# Patient Record
Sex: FEMALE | Race: BLACK OR AFRICAN AMERICAN | NOT HISPANIC OR LATINO | Employment: PART TIME | RURAL
[De-identification: names, ages, dates, MRNs, and addresses within clinical notes are randomized per-mention and may not be internally consistent; named-entity substitution may affect disease eponyms.]

---

## 2023-05-15 ENCOUNTER — OFFICE VISIT (OUTPATIENT)
Dept: FAMILY MEDICINE | Facility: CLINIC | Age: 63
End: 2023-05-15
Payer: COMMERCIAL

## 2023-05-15 VITALS
WEIGHT: 87.38 LBS | SYSTOLIC BLOOD PRESSURE: 110 MMHG | HEART RATE: 63 BPM | OXYGEN SATURATION: 99 % | BODY MASS INDEX: 14.56 KG/M2 | RESPIRATION RATE: 18 BRPM | DIASTOLIC BLOOD PRESSURE: 70 MMHG | TEMPERATURE: 99 F | HEIGHT: 65 IN

## 2023-05-15 DIAGNOSIS — N39.0 URINARY TRACT INFECTION WITHOUT HEMATURIA, SITE UNSPECIFIED: ICD-10-CM

## 2023-05-15 DIAGNOSIS — Z80.0 FAMILY HISTORY OF COLON CANCER IN MOTHER: ICD-10-CM

## 2023-05-15 DIAGNOSIS — K59.00 CONSTIPATION, UNSPECIFIED CONSTIPATION TYPE: ICD-10-CM

## 2023-05-15 DIAGNOSIS — R10.32 BILATERAL LOWER ABDOMINAL CRAMPING: ICD-10-CM

## 2023-05-15 DIAGNOSIS — R10.31 BILATERAL LOWER ABDOMINAL CRAMPING: ICD-10-CM

## 2023-05-15 DIAGNOSIS — K92.1 BLOOD IN STOOL: Primary | ICD-10-CM

## 2023-05-15 LAB
BASOPHILS # BLD AUTO: 0.06 K/UL (ref 0–0.2)
BASOPHILS NFR BLD AUTO: 0.9 % (ref 0–1)
BILIRUB SERPL-MCNC: NEGATIVE MG/DL
BLOOD URINE, POC: ABNORMAL
CEA SERPL-MCNC: 3.4 NG/ML (ref 0–3)
COLOR, POC UA: YELLOW
DIFFERENTIAL METHOD BLD: ABNORMAL
EOSINOPHIL # BLD AUTO: 0.17 K/UL (ref 0–0.5)
EOSINOPHIL NFR BLD AUTO: 2.6 % (ref 1–4)
ERYTHROCYTE [DISTWIDTH] IN BLOOD BY AUTOMATED COUNT: 13.4 % (ref 11.5–14.5)
GLUCOSE UR QL STRIP: NEGATIVE
HCT VFR BLD AUTO: 40.6 % (ref 38–47)
HGB BLD-MCNC: 13.3 G/DL (ref 12–16)
IMM GRANULOCYTES # BLD AUTO: 0.01 K/UL (ref 0–0.04)
IMM GRANULOCYTES NFR BLD: 0.2 % (ref 0–0.4)
KETONES UR QL STRIP: NEGATIVE
LEUKOCYTE ESTERASE URINE, POC: ABNORMAL
LYMPHOCYTES # BLD AUTO: 2.23 K/UL (ref 1–4.8)
LYMPHOCYTES NFR BLD AUTO: 33.9 % (ref 27–41)
MCH RBC QN AUTO: 32.9 PG (ref 27–31)
MCHC RBC AUTO-ENTMCNC: 32.8 G/DL (ref 32–36)
MCV RBC AUTO: 100.5 FL (ref 80–96)
MONOCYTES # BLD AUTO: 0.66 K/UL (ref 0–0.8)
MONOCYTES NFR BLD AUTO: 10 % (ref 2–6)
MPC BLD CALC-MCNC: 10.6 FL (ref 9.4–12.4)
NEUTROPHILS # BLD AUTO: 3.44 K/UL (ref 1.8–7.7)
NEUTROPHILS NFR BLD AUTO: 52.4 % (ref 53–65)
NITRITE, POC UA: POSITIVE
NRBC # BLD AUTO: 0 X10E3/UL
NRBC, AUTO (.00): 0 %
PH, POC UA: 5.5
PLATELET # BLD AUTO: 290 K/UL (ref 150–400)
PROTEIN, POC: NEGATIVE
RBC # BLD AUTO: 4.04 M/UL (ref 4.2–5.4)
SPECIFIC GRAVITY, POC UA: 1.01
UROBILINOGEN, POC UA: 0.2
WBC # BLD AUTO: 6.57 K/UL (ref 4.5–11)

## 2023-05-15 PROCEDURE — 87086 CULTURE, URINE: ICD-10-PCS | Mod: ,,, | Performed by: CLINICAL MEDICAL LABORATORY

## 2023-05-15 PROCEDURE — 87086 URINE CULTURE/COLONY COUNT: CPT | Mod: ,,, | Performed by: CLINICAL MEDICAL LABORATORY

## 2023-05-15 PROCEDURE — 82378 CEA: ICD-10-PCS | Mod: ,,, | Performed by: CLINICAL MEDICAL LABORATORY

## 2023-05-15 PROCEDURE — 99203 PR OFFICE/OUTPT VISIT, NEW, LEVL III, 30-44 MIN: ICD-10-PCS | Mod: ,,, | Performed by: NURSE PRACTITIONER

## 2023-05-15 PROCEDURE — 1159F MED LIST DOCD IN RCRD: CPT | Mod: CPTII,,, | Performed by: NURSE PRACTITIONER

## 2023-05-15 PROCEDURE — 3008F PR BODY MASS INDEX (BMI) DOCUMENTED: ICD-10-PCS | Mod: CPTII,,, | Performed by: NURSE PRACTITIONER

## 2023-05-15 PROCEDURE — 81003 URINALYSIS AUTO W/O SCOPE: CPT | Mod: QW,,, | Performed by: NURSE PRACTITIONER

## 2023-05-15 PROCEDURE — 82270 POCT OCCULT BLOOD STOOL: ICD-10-PCS | Mod: ,,, | Performed by: NURSE PRACTITIONER

## 2023-05-15 PROCEDURE — 3008F BODY MASS INDEX DOCD: CPT | Mod: CPTII,,, | Performed by: NURSE PRACTITIONER

## 2023-05-15 PROCEDURE — 87186 CULTURE, URINE: ICD-10-PCS | Mod: ,,, | Performed by: CLINICAL MEDICAL LABORATORY

## 2023-05-15 PROCEDURE — 3074F PR MOST RECENT SYSTOLIC BLOOD PRESSURE < 130 MM HG: ICD-10-PCS | Mod: CPTII,,, | Performed by: NURSE PRACTITIONER

## 2023-05-15 PROCEDURE — 3074F SYST BP LT 130 MM HG: CPT | Mod: CPTII,,, | Performed by: NURSE PRACTITIONER

## 2023-05-15 PROCEDURE — 82378 CARCINOEMBRYONIC ANTIGEN: CPT | Mod: ,,, | Performed by: CLINICAL MEDICAL LABORATORY

## 2023-05-15 PROCEDURE — 3078F PR MOST RECENT DIASTOLIC BLOOD PRESSURE < 80 MM HG: ICD-10-PCS | Mod: CPTII,,, | Performed by: NURSE PRACTITIONER

## 2023-05-15 PROCEDURE — 1159F PR MEDICATION LIST DOCUMENTED IN MEDICAL RECORD: ICD-10-PCS | Mod: CPTII,,, | Performed by: NURSE PRACTITIONER

## 2023-05-15 PROCEDURE — 85025 COMPLETE CBC W/AUTO DIFF WBC: CPT | Mod: ,,, | Performed by: CLINICAL MEDICAL LABORATORY

## 2023-05-15 PROCEDURE — 82270 OCCULT BLOOD FECES: CPT | Mod: ,,, | Performed by: NURSE PRACTITIONER

## 2023-05-15 PROCEDURE — 85025 CBC WITH DIFFERENTIAL: ICD-10-PCS | Mod: ,,, | Performed by: CLINICAL MEDICAL LABORATORY

## 2023-05-15 PROCEDURE — 81003 POCT URINALYSIS W/O SCOPE: ICD-10-PCS | Mod: QW,,, | Performed by: NURSE PRACTITIONER

## 2023-05-15 PROCEDURE — 87186 SC STD MICRODIL/AGAR DIL: CPT | Mod: ,,, | Performed by: CLINICAL MEDICAL LABORATORY

## 2023-05-15 PROCEDURE — 87077 CULTURE AEROBIC IDENTIFY: CPT | Mod: ,,, | Performed by: CLINICAL MEDICAL LABORATORY

## 2023-05-15 PROCEDURE — 99203 OFFICE O/P NEW LOW 30 MIN: CPT | Mod: ,,, | Performed by: NURSE PRACTITIONER

## 2023-05-15 PROCEDURE — 87077 CULTURE, URINE: ICD-10-PCS | Mod: ,,, | Performed by: CLINICAL MEDICAL LABORATORY

## 2023-05-15 PROCEDURE — 3078F DIAST BP <80 MM HG: CPT | Mod: CPTII,,, | Performed by: NURSE PRACTITIONER

## 2023-05-15 RX ORDER — SULFAMETHOXAZOLE AND TRIMETHOPRIM 800; 160 MG/1; MG/1
1 TABLET ORAL 2 TIMES DAILY
Qty: 20 TABLET | Refills: 0 | Status: SHIPPED | OUTPATIENT
Start: 2023-05-15 | End: 2023-05-25

## 2023-05-15 RX ORDER — LACTULOSE 10 G/15ML
10 SOLUTION ORAL 2 TIMES DAILY PRN
Qty: 420 ML | Refills: 0 | Status: SHIPPED | OUTPATIENT
Start: 2023-05-15 | End: 2023-08-02 | Stop reason: SDUPTHER

## 2023-05-15 NOTE — PROGRESS NOTES
"Subjective:       Patient ID: Bipin Bassett is a 62 y.o. female.    Chief Complaint: Abdominal Cramping (Lower part of ABD pain. Feels full but also feel hungry. Last BM yesterday with blood and stringy. )    Presents to clinic as above. States has intermittent blood in stool for a couple of months. Has always been thin. No hx of weight loss. Mother has colon cancer. No black or tarry stools. No weight loss. No fever. Wants referral to GI. Only sees very small amounts of blood in stool. Usually when constipated.     Review of Systems   Constitutional: Negative.    Respiratory: Negative.     Cardiovascular: Negative.    Gastrointestinal:  Positive for abdominal pain, blood in stool and constipation. Negative for diarrhea, nausea and vomiting.   Neurological: Negative.         Reviewed family, medical, surgical, and social history.    Objective:      /70 (BP Location: Left arm, Patient Position: Sitting)   Pulse 63   Temp 98.7 °F (37.1 °C) (Oral)   Resp 18   Ht 5' 5" (1.651 m)   Wt 39.6 kg (87 lb 6.4 oz)   SpO2 99%   BMI 14.54 kg/m²   Physical Exam  Vitals and nursing note reviewed.   Constitutional:       General: She is not in acute distress.     Appearance: Normal appearance. She is not ill-appearing, toxic-appearing or diaphoretic.   HENT:      Head: Normocephalic.      Mouth/Throat:      Mouth: Mucous membranes are moist.   Eyes:      General: No scleral icterus.     Extraocular Movements: Extraocular movements intact.      Conjunctiva/sclera: Conjunctivae normal.   Cardiovascular:      Rate and Rhythm: Normal rate and regular rhythm.      Heart sounds: Normal heart sounds.   Pulmonary:      Effort: Pulmonary effort is normal.      Breath sounds: Normal breath sounds.   Abdominal:      General: Abdomen is flat. Bowel sounds are normal. There is no distension.      Palpations: Abdomen is soft. There is no mass.      Tenderness: There is no abdominal tenderness. There is no right CVA tenderness, " left CVA tenderness, guarding or rebound.      Hernia: No hernia is present.   Musculoskeletal:      Cervical back: Normal range of motion and neck supple.   Skin:     General: Skin is warm and dry.      Capillary Refill: Capillary refill takes less than 2 seconds.   Neurological:      Mental Status: She is alert and oriented to person, place, and time.   Psychiatric:         Mood and Affect: Mood normal.         Behavior: Behavior normal.         Thought Content: Thought content normal.         Judgment: Judgment normal.          Office Visit on 05/15/2023   Component Date Value Ref Range Status    Color, UA 05/15/2023 Yellow   Final    Spec Grav UA 05/15/2023 1.010   Final    pH, UA 05/15/2023 5.5   Final    WBC, UA 05/15/2023 Trace   Final    Nitrite, UA 05/15/2023 Positive   Final    Protein, POC 05/15/2023 Negative   Final    Glucose, UA 05/15/2023 Negative   Final    Ketones, UA 05/15/2023 Negative   Final    Bilirubin, POC 05/15/2023 Negative   Final    Urobilinogen, UA 05/15/2023 0.2   Final    Blood, UA 05/15/2023 Trace   Final      Assessment:       1. Blood in stool    2. Family history of colon cancer in mother    3. Bilateral lower abdominal cramping    4. Urinary tract infection without hematuria, site unspecified    5. Constipation, unspecified constipation type        Plan:       Blood in stool  -     POCT occult blood stool  -     X-Ray Abdomen AP 1 View; Future; Expected date: 05/15/2023  -     CBC Auto Differential; Future; Expected date: 05/15/2023  -     CEA; Future; Expected date: 05/15/2023  -     Ambulatory referral/consult to Gastroenterology; Future; Expected date: 05/22/2023    Family history of colon cancer in mother  -     CEA; Future; Expected date: 05/15/2023  -     Ambulatory referral/consult to Gastroenterology; Future; Expected date: 05/22/2023    Bilateral lower abdominal cramping  -     X-Ray Abdomen AP 1 View; Future; Expected date: 05/15/2023  -     CBC Auto Differential;  Future; Expected date: 05/15/2023  -     POCT URINALYSIS W/O SCOPE  -     Urine culture; Future; Expected date: 05/15/2023    Urinary tract infection without hematuria, site unspecified  -     sulfamethoxazole-trimethoprim 800-160mg (BACTRIM DS) 800-160 mg Tab; Take 1 tablet by mouth 2 (two) times daily. for 10 days  Dispense: 20 tablet; Refill: 0    Constipation, unspecified constipation type  -     lactulose (CHRONULAC) 20 gram/30 mL Soln; Take 15 mLs (10 g total) by mouth 2 (two) times daily as needed (constipation).  Dispense: 420 mL; Refill: 0    F/U with us if GI has not called you within a couple of days  Go to ER with black/tarry stools or rectal hemorrage  I will call with results  RTC PRN          Risks, benefits, and side effects were discussed with the patient. All questions were answered to the fullest satisfaction of the patient, and pt verbalized understanding and agreement to treatment plan. Pt was to call with any new or worsening symptoms, or present to the ER.

## 2023-05-17 LAB — UA COMPLETE W REFLEX CULTURE PNL UR: ABNORMAL

## 2023-05-18 ENCOUNTER — TELEPHONE (OUTPATIENT)
Dept: FAMILY MEDICINE | Facility: CLINIC | Age: 63
End: 2023-05-18
Payer: COMMERCIAL

## 2023-05-18 NOTE — TELEPHONE ENCOUNTER
Pt notified. Voiced understanding. ----- Message from PETRA Madrigal sent at 5/17/2023 10:25 AM CDT -----  On bactrim

## 2023-05-23 ENCOUNTER — TELEPHONE (OUTPATIENT)
Dept: GASTROENTEROLOGY | Facility: CLINIC | Age: 63
End: 2023-05-23
Payer: COMMERCIAL

## 2023-05-23 ENCOUNTER — OFFICE VISIT (OUTPATIENT)
Dept: GASTROENTEROLOGY | Facility: CLINIC | Age: 63
End: 2023-05-23
Payer: COMMERCIAL

## 2023-05-23 ENCOUNTER — HOSPITAL ENCOUNTER (OUTPATIENT)
Dept: RADIOLOGY | Facility: HOSPITAL | Age: 63
Discharge: HOME | End: 2023-05-23
Attending: NURSE PRACTITIONER
Payer: COMMERCIAL

## 2023-05-23 VITALS
HEART RATE: 90 BPM | WEIGHT: 87 LBS | OXYGEN SATURATION: 98 % | HEIGHT: 65 IN | DIASTOLIC BLOOD PRESSURE: 72 MMHG | SYSTOLIC BLOOD PRESSURE: 134 MMHG | BODY MASS INDEX: 14.49 KG/M2 | RESPIRATION RATE: 17 BRPM

## 2023-05-23 DIAGNOSIS — Z11.4 ENCOUNTER FOR SCREENING FOR HIV: ICD-10-CM

## 2023-05-23 DIAGNOSIS — Z11.59 NEED FOR HEPATITIS C SCREENING TEST: ICD-10-CM

## 2023-05-23 DIAGNOSIS — K59.00 CONSTIPATION, UNSPECIFIED CONSTIPATION TYPE: ICD-10-CM

## 2023-05-23 DIAGNOSIS — K92.1 BLOOD IN STOOL: Primary | ICD-10-CM

## 2023-05-23 DIAGNOSIS — R63.4 WEIGHT LOSS: ICD-10-CM

## 2023-05-23 DIAGNOSIS — D75.89 MACROCYTOSIS: ICD-10-CM

## 2023-05-23 DIAGNOSIS — R10.9 ABDOMINAL PAIN, UNSPECIFIED ABDOMINAL LOCATION: ICD-10-CM

## 2023-05-23 DIAGNOSIS — Z80.0 FAMILY HISTORY OF COLON CANCER IN MOTHER: ICD-10-CM

## 2023-05-23 PROCEDURE — 3078F PR MOST RECENT DIASTOLIC BLOOD PRESSURE < 80 MM HG: ICD-10-PCS | Mod: CPTII,,, | Performed by: NURSE PRACTITIONER

## 2023-05-23 PROCEDURE — 1159F MED LIST DOCD IN RCRD: CPT | Mod: CPTII,,, | Performed by: NURSE PRACTITIONER

## 2023-05-23 PROCEDURE — 71046 X-RAY EXAM CHEST 2 VIEWS: CPT | Mod: TC

## 2023-05-23 PROCEDURE — 71046 XR CHEST PA AND LATERAL: ICD-10-PCS | Mod: 26,,, | Performed by: RADIOLOGY

## 2023-05-23 PROCEDURE — 1159F PR MEDICATION LIST DOCUMENTED IN MEDICAL RECORD: ICD-10-PCS | Mod: CPTII,,, | Performed by: NURSE PRACTITIONER

## 2023-05-23 PROCEDURE — 3078F DIAST BP <80 MM HG: CPT | Mod: CPTII,,, | Performed by: NURSE PRACTITIONER

## 2023-05-23 PROCEDURE — 3075F SYST BP GE 130 - 139MM HG: CPT | Mod: CPTII,,, | Performed by: NURSE PRACTITIONER

## 2023-05-23 PROCEDURE — 99215 OFFICE O/P EST HI 40 MIN: CPT | Mod: PBBFAC,25 | Performed by: NURSE PRACTITIONER

## 2023-05-23 PROCEDURE — 3008F BODY MASS INDEX DOCD: CPT | Mod: CPTII,,, | Performed by: NURSE PRACTITIONER

## 2023-05-23 PROCEDURE — 3008F PR BODY MASS INDEX (BMI) DOCUMENTED: ICD-10-PCS | Mod: CPTII,,, | Performed by: NURSE PRACTITIONER

## 2023-05-23 PROCEDURE — 99204 OFFICE O/P NEW MOD 45 MIN: CPT | Mod: S$PBB,,, | Performed by: NURSE PRACTITIONER

## 2023-05-23 PROCEDURE — 71046 X-RAY EXAM CHEST 2 VIEWS: CPT | Mod: 26,,, | Performed by: RADIOLOGY

## 2023-05-23 PROCEDURE — 3075F PR MOST RECENT SYSTOLIC BLOOD PRESS GE 130-139MM HG: ICD-10-PCS | Mod: CPTII,,, | Performed by: NURSE PRACTITIONER

## 2023-05-23 PROCEDURE — 99204 PR OFFICE/OUTPT VISIT, NEW, LEVL IV, 45-59 MIN: ICD-10-PCS | Mod: S$PBB,,, | Performed by: NURSE PRACTITIONER

## 2023-05-23 NOTE — TELEPHONE ENCOUNTER
Results called to patient. Verbalized understanding.        ----- Message from PETRA Hunt sent at 5/23/2023 12:49 PM CDT -----  Chest x ray is normal.

## 2023-05-23 NOTE — PROGRESS NOTES
Bipin Bassett is a 62 y.o. female here for Abdominal Cramping and Rectal Bleeding        PCP: Primary Doctor No  Referring Provider: Sabrina Sullivan, Gmp  [address] [place], [place] [address]MS [id number] [phone]     HPI:  Presents with report of blood in stool.  Patient reports that she does see bright red blood in stool intermittently.  Reports that it has been occurring over the last 3-4 months.  She does have a family history of colon cancer in her mother.  She has never had a colonoscopy screening.  Endorses abdominal pain and cramping.  She is very tender to palpation on exam.  Constipation, she is currently taking lactulose.  Additionally she has had weight loss of approximately 10 lb in 4 months.  Reports that she has a good appetite and that she is eating well despite weight loss. Labs reviewed from 05/15, HGB is 13.3 and HCT 40.6, CEA level was performed and is elevated at 3.4.  Admits to drinking 2 beers per day.  Denies nausea, vomiting, and dysphagia.  She does smoke tobacco.  No recent chest x-ray, we will performed today.    Abdominal Cramping  Associated symptoms include constipation and hematochezia. Pertinent negatives include no diarrhea, fever, nausea or vomiting.   Rectal Bleeding  Pertinent negatives include no abdominal pain, change in bowel habit, chest pain, fatigue, fever, nausea or vomiting.       ROS:  Review of Systems   Constitutional:  Positive for unexpected weight change (10 lbs in 4 months). Negative for appetite change, fatigue and fever.   HENT:  Negative for trouble swallowing.    Respiratory:  Negative for shortness of breath.    Cardiovascular:  Negative for chest pain.   Gastrointestinal:  Positive for blood in stool, constipation and hematochezia. Negative for abdominal pain, change in bowel habit, diarrhea, nausea, vomiting, reflux and change in bowel habit.   Musculoskeletal:  Negative for gait problem.   Integumentary:  Negative for pallor.  "  Psychiatric/Behavioral:  The patient is not nervous/anxious.         PMHX:  has no past medical history on file.    PSHX:  has no past surgical history on file.    PFHX: family history is not on file.    PSlHX:  reports that she has been smoking cigarettes. She has been smoking an average of .5 packs per day. She has never used smokeless tobacco.        Review of patient's allergies indicates:   Allergen Reactions    Asa [aspirin] Anaphylaxis    Penicillins Anaphylaxis       Medication List with Changes/Refills   Current Medications    LACTULOSE (CHRONULAC) 20 GRAM/30 ML SOLN    Take 15 mLs (10 g total) by mouth 2 (two) times daily as needed (constipation).    SULFAMETHOXAZOLE-TRIMETHOPRIM 800-160MG (BACTRIM DS) 800-160 MG TAB    Take 1 tablet by mouth 2 (two) times daily. for 10 days        Objective Findings:  Vital Signs:  /72   Pulse 90   Resp 17   Ht 5' 5" (1.651 m)   Wt 39.5 kg (87 lb)   LMP  (LMP Unknown)   SpO2 98%   BMI 14.48 kg/m²  Body mass index is 14.48 kg/m².    Physical Exam:  Physical Exam  Vitals and nursing note reviewed.   Constitutional:       General: She is not in acute distress.     Appearance: Normal appearance.   HENT:      Mouth/Throat:      Mouth: Mucous membranes are moist.   Cardiovascular:      Rate and Rhythm: Normal rate.   Pulmonary:      Effort: Pulmonary effort is normal.      Breath sounds: No wheezing, rhonchi or rales.   Abdominal:      General: Bowel sounds are normal. There is no distension.      Palpations: Abdomen is soft. There is no mass.      Tenderness: There is generalized abdominal tenderness. There is no guarding.      Hernia: No hernia is present.   Skin:     General: Skin is warm and dry.      Coloration: Skin is not jaundiced or pale.   Neurological:      Mental Status: She is alert and oriented to person, place, and time.   Psychiatric:         Mood and Affect: Mood normal.        Labs:  Lab Results   Component Value Date    WBC 6.57 05/15/2023    " HGB 13.3 05/15/2023    HCT 40.6 05/15/2023    .5 (H) 05/15/2023    RDW 13.4 05/15/2023     05/15/2023    LYMPH 33.9 05/15/2023    LYMPH 2.23 05/15/2023    MONO 10.0 (H) 05/15/2023    EOS 0.17 05/15/2023    BASO 0.06 05/15/2023     No results found for: NA, K, CL, CO2, GLU, BUN, CREATININE, CALCIUM, PROT, ALBUMIN, BILITOT, ALKPHOS, AST, ALT      Imaging: X-Ray Abdomen AP 1 View    Result Date: 5/15/2023  EXAMINATION: XR ABDOMEN AP 1 VIEW CLINICAL HISTORY: Melena TECHNIQUE: Abdomen, 2 supine views COMPARISON: None. FINDINGS: There is abundant stool throughout.  No small bowel dilatation is present.  Vascular calcifications are noted in the abdomen and pelvis.  Bony structures are within normal limits.  Lung bases are clear.     Abundant stool. [facility] of [facility] Electronically signed by: Dorie Berry Date:    05/15/2023 Time:    12:11        Assessment:  Bipin Bassett is a 62 y.o. female here with:  1. Blood in stool    2. Family history of colon cancer in mother    3. Weight loss    4. Macrocytosis    5. Abdominal pain, unspecified abdominal location    6. Need for hepatitis C screening test    7. Encounter for screening for HIV    8. Constipation, unspecified constipation type          Recommendations:  1. Schedule CT abdomen and pelvis due to 10 lb weight loss in 4 months and abdominal pain, CEA elevation  2. Chest x ray  3. TSH, free T4, B12 and folate, CMP, hepatitis-C, HIV  4. Schedule colonoscopy  5. Consider EGD  6. Continue lactulose for constipation      Follow up in about 2 months (around 7/23/2023).      Order summary:  Orders Placed This Encounter    X-Ray Chest PA And Lateral    CT Abdomen Pelvis With Contrast    TSH    T4, Free    Vitamin B12 & Folate    Comprehensive Metabolic Panel    Hepatitis C Antibody    HIV 1/2 Ag/Ab (4th Gen)    Colonoscopy       Thank you for allowing me to participate in the care of Bipin Bassett.      Cathryn Saenz,  FNP-C

## 2023-05-24 ENCOUNTER — TELEPHONE (OUTPATIENT)
Dept: GASTROENTEROLOGY | Facility: CLINIC | Age: 63
End: 2023-05-24
Payer: COMMERCIAL

## 2023-05-24 NOTE — TELEPHONE ENCOUNTER
Attempted to call results. Left message.          ----- Message from PETRA Hunt sent at 5/24/2023 12:41 PM CDT -----  Oral B 12 supplement recommended. B 12 is low normal. HIV negative. Liver enzymes are normal. Hepatitis C is negative.

## 2023-05-25 ENCOUNTER — TELEPHONE (OUTPATIENT)
Dept: GASTROENTEROLOGY | Facility: CLINIC | Age: 63
End: 2023-05-25
Payer: COMMERCIAL

## 2023-05-25 NOTE — TELEPHONE ENCOUNTER
Results and recommendations called to patient. Verbalized understanding.            ----- Message from PETRA Hunt sent at 5/24/2023 12:41 PM CDT -----  Oral B 12 supplement recommended. B 12 is low normal. HIV negative. Liver enzymes are normal. Hepatitis C is negative.

## 2023-06-05 ENCOUNTER — TELEPHONE (OUTPATIENT)
Dept: GASTROENTEROLOGY | Facility: CLINIC | Age: 63
End: 2023-06-05
Payer: COMMERCIAL

## 2023-06-05 ENCOUNTER — HOSPITAL ENCOUNTER (OUTPATIENT)
Dept: RADIOLOGY | Facility: HOSPITAL | Age: 63
Discharge: HOME | End: 2023-06-05
Attending: NURSE PRACTITIONER
Payer: COMMERCIAL

## 2023-06-05 DIAGNOSIS — R63.4 WEIGHT LOSS: ICD-10-CM

## 2023-06-05 DIAGNOSIS — K92.1 BLOOD IN STOOL: ICD-10-CM

## 2023-06-05 DIAGNOSIS — R93.5 ABNORMAL CT OF THE ABDOMEN: Primary | ICD-10-CM

## 2023-06-05 DIAGNOSIS — R10.9 ABDOMINAL PAIN, UNSPECIFIED ABDOMINAL LOCATION: ICD-10-CM

## 2023-06-05 PROCEDURE — 25500020 PHARM REV CODE 255: Performed by: NURSE PRACTITIONER

## 2023-06-05 PROCEDURE — 74177 CT ABD & PELVIS W/CONTRAST: CPT | Mod: 26,,, | Performed by: STUDENT IN AN ORGANIZED HEALTH CARE EDUCATION/TRAINING PROGRAM

## 2023-06-05 PROCEDURE — 74177 CT ABDOMEN PELVIS WITH CONTRAST: ICD-10-PCS | Mod: 26,,, | Performed by: STUDENT IN AN ORGANIZED HEALTH CARE EDUCATION/TRAINING PROGRAM

## 2023-06-05 PROCEDURE — 74177 CT ABD & PELVIS W/CONTRAST: CPT | Mod: TC

## 2023-06-05 RX ADMIN — IOPAMIDOL 100 ML: 755 INJECTION, SOLUTION INTRAVENOUS at 10:06

## 2023-06-05 NOTE — TELEPHONE ENCOUNTER
Attempted to call results. Left message.            ----- Message from PETRA Hunt sent at 6/5/2023 11:13 AM CDT -----  Abnormal mucosal thickening in the jejunum.  She needs to have an EGD with enteroscopy.  Dr. Boyce has reviewed the CT report and has placed the orders.  EGD enteroscopy same time.  May add on Saturday on-call if needed if no other data available

## 2023-06-05 NOTE — TELEPHONE ENCOUNTER
Attempted to all results. Left message.              ----- Message from PETRA Hunt sent at 6/5/2023 11:13 AM CDT -----  Abnormal mucosal thickening in the jejunum.  She needs to have an EGD with enteroscopy.  Dr. Boyce has reviewed the CT report and has placed the orders.  EGD enteroscopy same time.  May add on Saturday on-call if needed if no other data available

## 2023-06-05 NOTE — TELEPHONE ENCOUNTER
Results and recommendations called to patient. Verbalized understanding. Placed on hold for Mili to schedule.                 ----- Message from PETRA Hunt sent at 6/5/2023 11:13 AM CDT -----  Abnormal mucosal thickening in the jejunum.  She needs to have an EGD with enteroscopy.  Dr. Boyce has reviewed the CT report and has placed the orders.  EGD enteroscopy same time.  May add on Saturday on-call if needed if no other data available

## 2023-06-06 ENCOUNTER — HOSPITAL ENCOUNTER (EMERGENCY)
Facility: HOSPITAL | Age: 63
Discharge: HOME | End: 2023-06-07
Attending: FAMILY MEDICINE
Payer: COMMERCIAL

## 2023-06-06 DIAGNOSIS — R10.9 ABDOMINAL PAIN, UNSPECIFIED ABDOMINAL LOCATION: Primary | ICD-10-CM

## 2023-06-06 LAB
ALBUMIN SERPL BCP-MCNC: 3.7 G/DL (ref 3.5–5)
ALBUMIN/GLOB SERPL: 0.9 {RATIO}
ALP SERPL-CCNC: 80 U/L (ref 50–130)
ALT SERPL W P-5'-P-CCNC: 19 U/L (ref 13–56)
ANION GAP SERPL CALCULATED.3IONS-SCNC: 13 MMOL/L (ref 7–16)
AST SERPL W P-5'-P-CCNC: 21 U/L (ref 15–37)
BASOPHILS # BLD AUTO: 0.05 K/UL (ref 0–0.2)
BASOPHILS NFR BLD AUTO: 0.6 % (ref 0–1)
BILIRUB SERPL-MCNC: 0.2 MG/DL (ref ?–1.2)
BUN SERPL-MCNC: 4 MG/DL (ref 7–18)
BUN/CREAT SERPL: 6 (ref 6–20)
CALCIUM SERPL-MCNC: 9 MG/DL (ref 8.5–10.1)
CHLORIDE SERPL-SCNC: 98 MMOL/L (ref 98–107)
CO2 SERPL-SCNC: 27 MMOL/L (ref 21–32)
CREAT SERPL-MCNC: 0.65 MG/DL (ref 0.55–1.02)
DIFFERENTIAL METHOD BLD: ABNORMAL
EGFR (NO RACE VARIABLE) (RUSH/TITUS): 98 ML/MIN/1.73M2
EOSINOPHIL # BLD AUTO: 0.26 K/UL (ref 0–0.5)
EOSINOPHIL NFR BLD AUTO: 3.2 % (ref 1–4)
ERYTHROCYTE [DISTWIDTH] IN BLOOD BY AUTOMATED COUNT: 13.6 % (ref 11.5–14.5)
GLOBULIN SER-MCNC: 4 G/DL (ref 2–4)
GLUCOSE SERPL-MCNC: 83 MG/DL (ref 74–106)
HCT VFR BLD AUTO: 38.1 % (ref 38–47)
HGB BLD-MCNC: 12.7 G/DL (ref 12–16)
IMM GRANULOCYTES # BLD AUTO: 0.02 K/UL (ref 0–0.04)
IMM GRANULOCYTES NFR BLD: 0.2 % (ref 0–0.4)
LYMPHOCYTES # BLD AUTO: 3.86 K/UL (ref 1–4.8)
LYMPHOCYTES NFR BLD AUTO: 48.2 % (ref 27–41)
MCH RBC QN AUTO: 34 PG (ref 27–31)
MCHC RBC AUTO-ENTMCNC: 33.3 G/DL (ref 32–36)
MCV RBC AUTO: 101.9 FL (ref 80–96)
MONOCYTES # BLD AUTO: 0.77 K/UL (ref 0–0.8)
MONOCYTES NFR BLD AUTO: 9.6 % (ref 2–6)
MPC BLD CALC-MCNC: 9.8 FL (ref 9.4–12.4)
NEUTROPHILS # BLD AUTO: 3.05 K/UL (ref 1.8–7.7)
NEUTROPHILS NFR BLD AUTO: 38.2 % (ref 53–65)
NRBC # BLD AUTO: 0 X10E3/UL
NRBC, AUTO (.00): 0 %
PLATELET # BLD AUTO: 258 K/UL (ref 150–400)
POTASSIUM SERPL-SCNC: 3.5 MMOL/L (ref 3.5–5.1)
PROT SERPL-MCNC: 7.7 G/DL (ref 6.4–8.2)
RBC # BLD AUTO: 3.74 M/UL (ref 4.2–5.4)
SODIUM SERPL-SCNC: 134 MMOL/L (ref 136–145)
WBC # BLD AUTO: 8.01 K/UL (ref 4.5–11)

## 2023-06-06 PROCEDURE — 99284 PR EMERGENCY DEPT VISIT,LEVEL IV: ICD-10-PCS | Mod: ,,, | Performed by: FAMILY MEDICINE

## 2023-06-06 PROCEDURE — 99284 EMERGENCY DEPT VISIT MOD MDM: CPT | Mod: ,,, | Performed by: FAMILY MEDICINE

## 2023-06-06 PROCEDURE — 80053 COMPREHEN METABOLIC PANEL: CPT | Performed by: FAMILY MEDICINE

## 2023-06-06 PROCEDURE — 85025 COMPLETE CBC W/AUTO DIFF WBC: CPT | Performed by: FAMILY MEDICINE

## 2023-06-06 PROCEDURE — 99284 EMERGENCY DEPT VISIT MOD MDM: CPT | Mod: 25

## 2023-06-06 RX ORDER — HYDROMORPHONE HYDROCHLORIDE 2 MG/ML
1 INJECTION, SOLUTION INTRAMUSCULAR; INTRAVENOUS; SUBCUTANEOUS
Status: COMPLETED | OUTPATIENT
Start: 2023-06-06 | End: 2023-06-07

## 2023-06-06 RX ORDER — ONDANSETRON 2 MG/ML
4 INJECTION INTRAMUSCULAR; INTRAVENOUS
Status: COMPLETED | OUTPATIENT
Start: 2023-06-06 | End: 2023-06-07

## 2023-06-06 RX ORDER — HYDROMORPHONE HYDROCHLORIDE 2 MG/ML
2 INJECTION, SOLUTION INTRAMUSCULAR; INTRAVENOUS; SUBCUTANEOUS
Status: DISCONTINUED | OUTPATIENT
Start: 2023-06-06 | End: 2023-06-06

## 2023-06-06 NOTE — Clinical Note
"Bipin Kellertona Bassett was seen and treated in our emergency department on 6/6/2023.  She may return to work on 06/08/2023.       If you have any questions or concerns, please don't hesitate to call.      Laurel Hernandez RN    "

## 2023-06-07 ENCOUNTER — TELEPHONE (OUTPATIENT)
Dept: EMERGENCY MEDICINE | Facility: HOSPITAL | Age: 63
End: 2023-06-07
Payer: COMMERCIAL

## 2023-06-07 VITALS
OXYGEN SATURATION: 92 % | HEIGHT: 65 IN | SYSTOLIC BLOOD PRESSURE: 103 MMHG | TEMPERATURE: 98 F | RESPIRATION RATE: 20 BRPM | DIASTOLIC BLOOD PRESSURE: 52 MMHG | BODY MASS INDEX: 14.49 KG/M2 | HEART RATE: 65 BPM | WEIGHT: 87 LBS

## 2023-06-07 PROCEDURE — 96375 TX/PRO/DX INJ NEW DRUG ADDON: CPT

## 2023-06-07 PROCEDURE — 96374 THER/PROPH/DIAG INJ IV PUSH: CPT

## 2023-06-07 PROCEDURE — 63600175 PHARM REV CODE 636 W HCPCS: Performed by: FAMILY MEDICINE

## 2023-06-07 RX ORDER — PROMETHAZINE HYDROCHLORIDE 25 MG/1
25 TABLET ORAL EVERY 6 HOURS PRN
Qty: 20 TABLET | Refills: 0 | Status: SHIPPED | OUTPATIENT
Start: 2023-06-07 | End: 2023-06-27

## 2023-06-07 RX ORDER — CIPROFLOXACIN 500 MG/1
500 TABLET ORAL 2 TIMES DAILY
Qty: 20 TABLET | Refills: 0 | Status: SHIPPED | OUTPATIENT
Start: 2023-06-07 | End: 2023-06-17

## 2023-06-07 RX ORDER — DICYCLOMINE HYDROCHLORIDE 20 MG/1
20 TABLET ORAL 2 TIMES DAILY
Qty: 20 TABLET | Refills: 0 | Status: SHIPPED | OUTPATIENT
Start: 2023-06-07 | End: 2023-07-07

## 2023-06-07 RX ORDER — METRONIDAZOLE 500 MG/1
500 TABLET ORAL EVERY 8 HOURS
Qty: 21 TABLET | Refills: 0 | Status: SHIPPED | OUTPATIENT
Start: 2023-06-07 | End: 2023-06-14

## 2023-06-07 RX ADMIN — ONDANSETRON 4 MG: 2 INJECTION INTRAMUSCULAR; INTRAVENOUS at 12:06

## 2023-06-07 RX ADMIN — HYDROMORPHONE HYDROCHLORIDE 1 MG: 2 INJECTION, SOLUTION INTRAMUSCULAR; INTRAVENOUS; SUBCUTANEOUS at 12:06

## 2023-06-07 NOTE — ED PROVIDER NOTES
Encounter Date: 6/6/2023    SCRIBE #1 NOTE: I, Catherine Hernandez, am scribing for, and in the presence of,  Peter Cantu MD. I have scribed the entire note.     History     Chief Complaint   Patient presents with    Abdominal Pain     The patient is a 62 y.o. female that presents to the emergency department via EMS due to abdominal pain. The patient explains that yesterday June 5 she was seen by GI and had a CT done and they found a thickening in her jejunum. She is now experiencing severe abdominal pain and states she has another appointment scheduled for this Saturday Josselyn 10. No other symptoms were reported.     The history is provided by the patient. No  was used.   Review of patient's allergies indicates:   Allergen Reactions    Asa [aspirin] Anaphylaxis    Penicillins Anaphylaxis     History reviewed. No pertinent past medical history.  History reviewed. No pertinent surgical history.  History reviewed. No pertinent family history.  Social History     Tobacco Use    Smoking status: Every Day     Packs/day: 0.50     Types: Cigarettes    Smokeless tobacco: Never   Substance Use Topics    Alcohol use: Yes    Drug use: Never     Review of Systems   Constitutional: Negative.    HENT: Negative.     Eyes: Negative.    Respiratory: Negative.     Cardiovascular: Negative.    Gastrointestinal:  Positive for abdominal pain.   Endocrine: Negative.    Genitourinary: Negative.    Musculoskeletal: Negative.    Skin: Negative.    Allergic/Immunologic: Negative.    Neurological: Negative.    Hematological: Negative.    Psychiatric/Behavioral: Negative.     All other systems reviewed and are negative.    Physical Exam     Initial Vitals [06/06/23 2309]   BP Pulse Resp Temp SpO2   (!) 147/82 63 18 97.8 °F (36.6 °C) 97 %      MAP       --         Physical Exam    Constitutional: She appears well-developed and well-nourished.   Eyes: Conjunctivae and EOM are normal. Pupils are equal, round, and reactive to  light.   Neck: Neck supple.   Normal range of motion.  Cardiovascular:  Normal rate, regular rhythm, normal heart sounds and intact distal pulses.           Pulmonary/Chest: Breath sounds normal.   Abdominal: Abdomen is soft. Bowel sounds are normal.   Musculoskeletal:         General: Normal range of motion.      Cervical back: Normal range of motion and neck supple.     Neurological: She is alert and oriented to person, place, and time. She has normal strength and normal reflexes.   Skin: Skin is warm and dry.   Psychiatric: She has a normal mood and affect. Her behavior is normal. Judgment and thought content normal.       ED Course   Procedures  Labs Reviewed   COMPREHENSIVE METABOLIC PANEL - Abnormal; Notable for the following components:       Result Value    Sodium 134 (*)     BUN 4 (*)     All other components within normal limits   CBC WITH DIFFERENTIAL - Abnormal; Notable for the following components:    RBC 3.74 (*)     .9 (*)     MCH 34.0 (*)     Neutrophils % 38.2 (*)     Lymphocytes % 48.2 (*)     Monocytes % 9.6 (*)     All other components within normal limits   CBC W/ AUTO DIFFERENTIAL    Narrative:     The following orders were created for panel order CBC Auto Differential.  Procedure                               Abnormality         Status                     ---------                               -----------         ------                     CBC with Differential[[id number]]        Abnormal            Final result                 Please view results for these tests on the individual orders.          Imaging Results    None          Medications   ondansetron injection 4 mg (4 mg Intravenous Given 6/7/23 0012)   HYDROmorphone (PF) injection 1 mg (1 mg Intravenous Given 6/7/23 0011)     Medical Decision Making:   Initial Assessment:   Patient in with abdominal cramping and pain.  States her mother has history of abdominal cancer.   She also with diarrhea.  From the medication she is taking.   No chest pain no shortness breath.  CT scan done yesterday showing thickening of the jejunum walk.  She is scheduled to see Dr. Yates for EGD and C scope  Differential Diagnosis:   Abdominal cramping with pain.--  ED Management:  Follow-up with Dr. Yates for further gastrointestinal study          Attending Attestation:           Physician Attestation for Scribe:  Physician Attestation Statement for Scribe #1: I, Peter Cantu MD, reviewed documentation, as scribed by Catherine Hernandez in my presence, and it is both accurate and complete.                        Clinical Impression:   Final diagnoses:  [R10.9] Abdominal pain, unspecified abdominal location (Primary)        ED Disposition Condition    Discharge Stable          ED Prescriptions       Medication Sig Dispense Start Date End Date Auth. Provider    dicyclomine (BENTYL) 20 mg tablet Take 1 tablet (20 mg total) by mouth 2 (two) times daily. 20 tablet 6/7/2023 7/7/2023 Peter Cantu DO    promethazine (PHENERGAN) 25 MG tablet Take 1 tablet (25 mg total) by mouth every 6 (six) hours as needed for Nausea. 20 tablet 6/7/2023 6/27/2023 Peter Cantu DO    ciprofloxacin HCl (CIPRO) 500 MG tablet Take 1 tablet (500 mg total) by mouth 2 (two) times daily. for 10 days 20 tablet 6/7/2023 6/17/2023 Peter Cantu DO    metroNIDAZOLE (FLAGYL) 500 MG tablet Take 1 tablet (500 mg total) by mouth every 8 (eight) hours. for 21 doses 21 tablet 6/7/2023 6/14/2023 Peter Cantu DO          Follow-up Information    None          Peter Cantu DO  06/07/23 0040

## 2023-06-08 ENCOUNTER — PATIENT MESSAGE (OUTPATIENT)
Dept: RESEARCH | Facility: HOSPITAL | Age: 63
End: 2023-06-08

## 2023-06-10 ENCOUNTER — HOSPITAL ENCOUNTER (OUTPATIENT)
Dept: GASTROENTEROLOGY | Facility: HOSPITAL | Age: 63
Discharge: HOME | End: 2023-06-10
Attending: INTERNAL MEDICINE
Payer: COMMERCIAL

## 2023-06-10 ENCOUNTER — ANESTHESIA (OUTPATIENT)
Dept: GASTROENTEROLOGY | Facility: HOSPITAL | Age: 63
End: 2023-06-10
Payer: COMMERCIAL

## 2023-06-10 ENCOUNTER — ANESTHESIA EVENT (OUTPATIENT)
Dept: GASTROENTEROLOGY | Facility: HOSPITAL | Age: 63
End: 2023-06-10
Payer: COMMERCIAL

## 2023-06-10 VITALS
DIASTOLIC BLOOD PRESSURE: 63 MMHG | SYSTOLIC BLOOD PRESSURE: 119 MMHG | TEMPERATURE: 97 F | HEART RATE: 83 BPM | OXYGEN SATURATION: 100 % | RESPIRATION RATE: 32 BRPM

## 2023-06-10 DIAGNOSIS — R63.4 WEIGHT LOSS: ICD-10-CM

## 2023-06-10 DIAGNOSIS — K29.80 DUODENITIS: Primary | ICD-10-CM

## 2023-06-10 DIAGNOSIS — R93.5 ABNORMAL CT OF THE ABDOMEN: ICD-10-CM

## 2023-06-10 DIAGNOSIS — K44.9 HH (HIATUS HERNIA): ICD-10-CM

## 2023-06-10 PROCEDURE — 88305 TISSUE EXAM BY PATHOLOGIST: CPT | Mod: TC,SUR | Performed by: INTERNAL MEDICINE

## 2023-06-10 PROCEDURE — 63600175 PHARM REV CODE 636 W HCPCS: Performed by: NURSE ANESTHETIST, CERTIFIED REGISTERED

## 2023-06-10 PROCEDURE — 43239 PR EGD, FLEX, W/BIOPSY, SGL/MULTI: ICD-10-PCS | Mod: 51,,, | Performed by: INTERNAL MEDICINE

## 2023-06-10 PROCEDURE — 43239 EGD BIOPSY SINGLE/MULTIPLE: CPT | Performed by: INTERNAL MEDICINE

## 2023-06-10 PROCEDURE — D9220A PRA ANESTHESIA: ICD-10-PCS | Mod: ANES,,, | Performed by: ANESTHESIOLOGY

## 2023-06-10 PROCEDURE — D9220A PRA ANESTHESIA: ICD-10-PCS | Mod: CRNA,,, | Performed by: NURSE ANESTHETIST, CERTIFIED REGISTERED

## 2023-06-10 PROCEDURE — 43239 EGD BIOPSY SINGLE/MULTIPLE: CPT | Mod: 51,,, | Performed by: INTERNAL MEDICINE

## 2023-06-10 PROCEDURE — 37000009 HC ANESTHESIA EA ADD 15 MINS: Performed by: ANESTHESIOLOGY

## 2023-06-10 PROCEDURE — 88305 SURGICAL PATHOLOGY: ICD-10-PCS | Mod: 26,,, | Performed by: PATHOLOGY

## 2023-06-10 PROCEDURE — 37000008 HC ANESTHESIA 1ST 15 MINUTES: Performed by: ANESTHESIOLOGY

## 2023-06-10 PROCEDURE — 88305 TISSUE EXAM BY PATHOLOGIST: CPT | Mod: 26,,, | Performed by: PATHOLOGY

## 2023-06-10 PROCEDURE — 27201423 OPTIME MED/SURG SUP & DEVICES STERILE SUPPLY: Performed by: ANESTHESIOLOGY

## 2023-06-10 PROCEDURE — 44360 SMALL BOWEL ENDOSCOPY: CPT | Mod: XB | Performed by: INTERNAL MEDICINE

## 2023-06-10 PROCEDURE — D9220A PRA ANESTHESIA: Mod: CRNA,,, | Performed by: NURSE ANESTHETIST, CERTIFIED REGISTERED

## 2023-06-10 PROCEDURE — D9220A PRA ANESTHESIA: Mod: ANES,,, | Performed by: ANESTHESIOLOGY

## 2023-06-10 PROCEDURE — 27000284 HC CANNULA NASAL: Performed by: NURSE ANESTHETIST, CERTIFIED REGISTERED

## 2023-06-10 PROCEDURE — 25000003 PHARM REV CODE 250: Performed by: NURSE ANESTHETIST, CERTIFIED REGISTERED

## 2023-06-10 RX ORDER — PROPOFOL 10 MG/ML
VIAL (ML) INTRAVENOUS
Status: DISCONTINUED | OUTPATIENT
Start: 2023-06-10 | End: 2023-06-10

## 2023-06-10 RX ORDER — LIDOCAINE HYDROCHLORIDE 20 MG/ML
INJECTION, SOLUTION EPIDURAL; INFILTRATION; INTRACAUDAL; PERINEURAL
Status: DISCONTINUED | OUTPATIENT
Start: 2023-06-10 | End: 2023-06-10

## 2023-06-10 RX ORDER — SODIUM CHLORIDE 0.9 % (FLUSH) 0.9 %
10 SYRINGE (ML) INJECTION
Status: DISCONTINUED | OUTPATIENT
Start: 2023-06-10 | End: 2023-06-11 | Stop reason: HOSPADM

## 2023-06-10 RX ORDER — SODIUM CHLORIDE 9 MG/ML
INJECTION, SOLUTION INTRAVENOUS CONTINUOUS PRN
Status: DISCONTINUED | OUTPATIENT
Start: 2023-06-10 | End: 2023-06-10

## 2023-06-10 RX ADMIN — SODIUM CHLORIDE: 9 INJECTION, SOLUTION INTRAVENOUS at 09:06

## 2023-06-10 RX ADMIN — PROPOFOL 50 MG: 10 INJECTION, EMULSION INTRAVENOUS at 10:06

## 2023-06-10 RX ADMIN — PROPOFOL 50 MG: 10 INJECTION, EMULSION INTRAVENOUS at 09:06

## 2023-06-10 RX ADMIN — LIDOCAINE HYDROCHLORIDE 60 MG: 20 INJECTION, SOLUTION INTRAVENOUS at 09:06

## 2023-06-10 NOTE — ANESTHESIA PREPROCEDURE EVALUATION
06/10/2023  Bipin Bassett is a 62 y.o., female.      Pre-op Assessment    I have reviewed the Patient Summary Reports.     I have reviewed the Nursing Notes. I have reviewed the NPO Status.   I have reviewed the Medications.     Review of Systems  Anesthesia Hx:  No problems with previous Anesthesia    Social:  No Alcohol Use, Smoker    Hematology/Oncology:     Oncology Normal    -- Anemia:   EENT/Dental:EENT/Dental Normal   Cardiovascular:  Cardiovascular Normal     Pulmonary:   COPD    Renal/:  Renal/ Normal     Hepatic/GI:  Hepatic/GI Normal Blood in stool   Musculoskeletal:  Musculoskeletal Normal    Neurological:  Neurology Normal    Endocrine:  Endocrine Normal    Dermatological:  Skin Normal    Psych:  Psychiatric Normal           Physical Exam  General: Well nourished    Airway:  Mallampati: II / II  Mouth Opening: Normal  TM Distance: > 6 cm  Tongue: Normal  Neck ROM: Normal ROM    Dental:  Periodontal disease, Loose teeth    Chest/Lungs:  Clear to auscultation, Normal Respiratory Rate    Heart:  Rate: Normal  Rhythm: Regular Rhythm        Anesthesia Plan  Type of Anesthesia, risks & benefits discussed:    Anesthesia Type: Gen Natural Airway  Intra-op Monitoring Plan: Standard ASA Monitors  Post Op Pain Control Plan: multimodal analgesia  Induction:  IV  Informed Consent: Informed consent signed with the Patient and all parties understand the risks and agree with anesthesia plan.  All questions answered.   ASA Score: 2  Day of Surgery Review of History & Physical: H&P Update referred to the surgeon/provider.I have interviewed and examined the patient. I have reviewed the patient's H&P dated: There are no significant changes. H&P completed by Anesthesiologist.    Ready For Surgery From Anesthesia Perspective.     .

## 2023-06-10 NOTE — DISCHARGE INSTRUCTIONS
EGD  Procedure Date  6/10/23     Impression  Overall Impression: Mucosa of the esophagus is normal, overlying a 2cm hiatus hernia. The stomach mucosa is normal. Mild inflammation was noted in the 2nd portion of the duodenum and biopsies were obtained.     Recommendation    Await pathology results      Avoid nsaids  Discharge:  Disp: Proceed with small bowel enteroscopy  Dx: weight loss, hiatus hernia, abnormal CT abdomen, duodenitis     Indication  Abnormal CT of the abdomen    Enteroscopy    Impression  Overall Impression: Mucosa of the esophagus was normal. The stomach had a 2cm hiatus hernia and normal mucosa. The duodenum had mild inflammation and biopsies had been done at egd. The jejunal mucosa  was grossly normal. The exam was carried out to 1m, 30cm. No biopsies were obtained during this exam.     Recommendation    Follow up with PCP      Avoid nsaids; keep appt for colonoscopy  Discharge:  Disp: DC to home, resume diet. No driving x 24 hours. F/U with PCP as scheduled and with MIRI Clark in GI clinic  Dx: weight loss, hiatus hernia, duodenitis, abnormal CT abdomen     NO DRIVING, OPERATING EQUIPMENT, OR SIGNING LEGAL DOCUMENTS FOR 24 HOURS.     THE NURSE WILL CALL YOU WITH YOUR BIOPSY RESULTS IN A FEW DAYS.

## 2023-06-10 NOTE — H&P
[facility]  Gastroenterology  H&P    Patient Name: Bipin Bassett  MRN: [id number]  Admission Date: 6/10/2023  Code Status: Full Code    Attending Provider: Benjamin Boyce MD   Primary Care Physician: Primary Doctor No  Principal Problem:<principal problem not specified>    Subjective:     History of Present Illness: Pt has weight loss and abnormal CT abdomen; for egd +/- small bowel enteroscopy.    History reviewed. No pertinent past medical history.    History reviewed. No pertinent surgical history.    Review of patient's allergies indicates:   Allergen Reactions    Asa [aspirin] Anaphylaxis    Penicillins Anaphylaxis     Family History    None       Tobacco Use    Smoking status: Every Day     Packs/day: 0.50     Types: Cigarettes    Smokeless tobacco: Never   Substance and Sexual Activity    Alcohol use: Yes    Drug use: Never    Sexual activity: Not on file     Review of Systems   Constitutional:  Positive for unexpected weight change.   Respiratory: Negative.     Cardiovascular: Negative.    Gastrointestinal:  Positive for blood in stool.   Objective:     Vital Signs (Most Recent):    Vital Signs (24h Range):           There is no height or weight on file to calculate BMI.    No intake or output data in the 24 hours ending 06/10/23 0928    Lines/Drains/Airways       None                   Physical Exam  Vitals reviewed.   Constitutional:       General: She is not in acute distress.     Appearance: Normal appearance. She is well-developed. She is ill-appearing.   HENT:      Head: Normocephalic and atraumatic.      Nose: Nose normal.   Eyes:      Pupils: Pupils are equal, round, and reactive to light.   Cardiovascular:      Rate and Rhythm: Normal rate and regular rhythm.   Pulmonary:      Effort: Pulmonary effort is normal.      Breath sounds: Normal breath sounds. No wheezing.   Abdominal:      General: Abdomen is flat. Bowel sounds are normal. There is no distension.      Palpations: Abdomen  is soft.      Tenderness: There is no abdominal tenderness. There is no guarding.   Skin:     General: Skin is warm and dry.      Coloration: Skin is not jaundiced.   Neurological:      Mental Status: She is alert.   Psychiatric:         Attention and Perception: Attention normal.         Mood and Affect: Affect normal.         Speech: Speech normal.         Behavior: Behavior is cooperative.      Comments: Pt was calm while speaking.       Significant Labs:  CBC: No results for input(s): WBC, HGB, HCT, PLT in the last 48 hours.  CMP: No results for input(s): GLU, CALCIUM, ALBUMIN, PROT, NA, K, CO2, CL, BUN, CREATININE, ALKPHOS, ALT, AST, BILITOT in the last 48 hours.    Significant Imaging:  Imaging results within the past 24 hours have been reviewed.    Assessment/Plan:     There are no hospital problems to display for this patient.        Imp: weight loss, abnormal CT abdomen  Plan: egd  +/- small bowel enteroscopy    Benjamin Boyce MD  Gastroenterology  [facility]

## 2023-06-10 NOTE — ANESTHESIA POSTPROCEDURE EVALUATION
Anesthesia Post Evaluation    Patient: Bipin Bassett    Procedure(s) Performed: * No procedures listed *    Final Anesthesia Type: general      Patient location during evaluation: PACU  Patient participation: Yes- Able to Participate  Level of consciousness: awake and sedated  Post-procedure vital signs: reviewed and stable  Pain management: adequate  Airway patency: patent    PONV status at discharge: No PONV  Anesthetic complications: no      Cardiovascular status: blood pressure returned to baseline  Respiratory status: unassisted  Hydration status: euvolemic  Follow-up not needed.          Vitals Value Taken Time   /78 06/10/23 1038   Temp 36.1 °C (97 °F) 06/10/23 1010   Pulse 65 06/10/23 1040   Resp 13 06/10/23 1040   SpO2 97 % 06/10/23 1040   Vitals shown include unvalidated device data.      Event Time   Out of Recovery 10:41:19         Pain/Talha Score: Talha Score: 10 (6/10/2023 10:40 AM)

## 2023-06-10 NOTE — TRANSFER OF CARE
Anesthesia Transfer of Care Note    Patient: Bipin Bassett    Procedure(s) Performed: * No procedures listed *    Patient location: GI    Anesthesia Type: general    Transport from OR: Transported from OR on room air with adequate spontaneous ventilation    Post pain: adequate analgesia    Post assessment: no apparent anesthetic complications    Post vital signs: stable    Level of consciousness: responds to stimulation    Nausea/Vomiting: no nausea/vomiting    Complications: none    Transfer of care protocol was followed      Last vitals:   Visit Vitals  BP (!) 143/74 (BP Location: Left arm, Patient Position: Lying)   Pulse 94   Temp 36.1 °C (97 °F) (Oral)   Resp (!) 29   LMP  (LMP Unknown)   SpO2 100%   Breastfeeding No

## 2023-06-13 ENCOUNTER — TELEPHONE (OUTPATIENT)
Dept: GASTROENTEROLOGY | Facility: CLINIC | Age: 63
End: 2023-06-13
Payer: COMMERCIAL

## 2023-06-13 LAB
ESTROGEN SERPL-MCNC: NORMAL PG/ML
INSULIN SERPL-ACNC: NORMAL U[IU]/ML
LAB AP GROSS DESCRIPTION: NORMAL
LAB AP LABORATORY NOTES: NORMAL
T3RU NFR SERPL: NORMAL %

## 2023-06-13 NOTE — TELEPHONE ENCOUNTER
Results and recommendations called to patient. Verbalized understanding.          ----- Message from Benjamin Boyce MD sent at 6/13/2023  3:09 PM CDT -----  EGD bx shows duodenitis. Avoid nsaids and return to GI clinic to follow-up with MIRI Clark. We'll consider performing a small bowel capsule enteroscopy to further evaluate the small intestine.

## 2023-06-13 NOTE — PROGRESS NOTES
EGD bx shows duodenitis. Avoid nsaids and return to GI clinic to follow-up with MIRI Clark. We'll consider performing a small bowel capsule enteroscopy to further evaluate the small intestine.

## 2023-07-11 ENCOUNTER — HOSPITAL ENCOUNTER (EMERGENCY)
Facility: HOSPITAL | Age: 63
Discharge: HOME | End: 2023-07-11
Attending: EMERGENCY MEDICINE
Payer: COMMERCIAL

## 2023-07-11 VITALS
TEMPERATURE: 99 F | HEIGHT: 65 IN | HEART RATE: 77 BPM | OXYGEN SATURATION: 98 % | DIASTOLIC BLOOD PRESSURE: 82 MMHG | SYSTOLIC BLOOD PRESSURE: 137 MMHG | BODY MASS INDEX: 14.24 KG/M2 | RESPIRATION RATE: 18 BRPM | WEIGHT: 85.5 LBS

## 2023-07-11 DIAGNOSIS — R10.33 PERIUMBILICAL ABDOMINAL PAIN: Primary | ICD-10-CM

## 2023-07-11 LAB
ALBUMIN SERPL BCP-MCNC: 3.6 G/DL (ref 3.5–5)
ALBUMIN/GLOB SERPL: 0.9 {RATIO}
ALP SERPL-CCNC: 77 U/L (ref 50–130)
ALT SERPL W P-5'-P-CCNC: 19 U/L (ref 13–56)
ANION GAP SERPL CALCULATED.3IONS-SCNC: 8 MMOL/L (ref 7–16)
AST SERPL W P-5'-P-CCNC: 24 U/L (ref 15–37)
BASOPHILS # BLD AUTO: 0.05 K/UL (ref 0–0.2)
BASOPHILS NFR BLD AUTO: 0.7 % (ref 0–1)
BILIRUB SERPL-MCNC: 0.2 MG/DL (ref ?–1.2)
BILIRUB UR QL STRIP: NEGATIVE
BUN SERPL-MCNC: 6 MG/DL (ref 7–18)
BUN/CREAT SERPL: 9 (ref 6–20)
CALCIUM SERPL-MCNC: 9 MG/DL (ref 8.5–10.1)
CHLORIDE SERPL-SCNC: 102 MMOL/L (ref 98–107)
CLARITY UR: CLEAR
CO2 SERPL-SCNC: 30 MMOL/L (ref 21–32)
COLOR UR: COLORLESS
CREAT SERPL-MCNC: 0.7 MG/DL (ref 0.55–1.02)
DIFFERENTIAL METHOD BLD: ABNORMAL
EGFR (NO RACE VARIABLE) (RUSH/TITUS): 97 ML/MIN/1.73M2
EOSINOPHIL # BLD AUTO: 0.25 K/UL (ref 0–0.5)
EOSINOPHIL NFR BLD AUTO: 3.6 % (ref 1–4)
ERYTHROCYTE [DISTWIDTH] IN BLOOD BY AUTOMATED COUNT: 14.4 % (ref 11.5–14.5)
GLOBULIN SER-MCNC: 4.1 G/DL (ref 2–4)
GLUCOSE SERPL-MCNC: 86 MG/DL (ref 74–106)
GLUCOSE UR STRIP-MCNC: NORMAL MG/DL
HCT VFR BLD AUTO: 38.2 % (ref 38–47)
HGB BLD-MCNC: 13 G/DL (ref 12–16)
IMM GRANULOCYTES # BLD AUTO: 0.02 K/UL (ref 0–0.04)
IMM GRANULOCYTES NFR BLD: 0.3 % (ref 0–0.4)
KETONES UR STRIP-SCNC: NEGATIVE MG/DL
LEUKOCYTE ESTERASE UR QL STRIP: NEGATIVE
LIPASE SERPL-CCNC: 140 U/L (ref 73–393)
LYMPHOCYTES # BLD AUTO: 3.17 K/UL (ref 1–4.8)
LYMPHOCYTES NFR BLD AUTO: 46.1 % (ref 27–41)
MCH RBC QN AUTO: 34.4 PG (ref 27–31)
MCHC RBC AUTO-ENTMCNC: 34 G/DL (ref 32–36)
MCV RBC AUTO: 101.1 FL (ref 80–96)
MONOCYTES # BLD AUTO: 0.67 K/UL (ref 0–0.8)
MONOCYTES NFR BLD AUTO: 9.7 % (ref 2–6)
MPC BLD CALC-MCNC: 10.1 FL (ref 9.4–12.4)
NEUTROPHILS # BLD AUTO: 2.72 K/UL (ref 1.8–7.7)
NEUTROPHILS NFR BLD AUTO: 39.6 % (ref 53–65)
NITRITE UR QL STRIP: NEGATIVE
NRBC # BLD AUTO: 0 X10E3/UL
NRBC, AUTO (.00): 0 %
PH UR STRIP: 5 PH UNITS
PLATELET # BLD AUTO: 269 K/UL (ref 150–400)
POTASSIUM SERPL-SCNC: 3.6 MMOL/L (ref 3.5–5.1)
PROT SERPL-MCNC: 7.7 G/DL (ref 6.4–8.2)
PROT UR QL STRIP: NEGATIVE
RBC # BLD AUTO: 3.78 M/UL (ref 4.2–5.4)
RBC # UR STRIP: NEGATIVE /UL
SODIUM SERPL-SCNC: 136 MMOL/L (ref 136–145)
SP GR UR STRIP: 1
UROBILINOGEN UR STRIP-ACNC: NORMAL MG/DL
WBC # BLD AUTO: 6.88 K/UL (ref 4.5–11)

## 2023-07-11 PROCEDURE — 99284 PR EMERGENCY DEPT VISIT,LEVEL IV: ICD-10-PCS | Mod: ,,, | Performed by: EMERGENCY MEDICINE

## 2023-07-11 PROCEDURE — 81003 URINALYSIS AUTO W/O SCOPE: CPT | Performed by: EMERGENCY MEDICINE

## 2023-07-11 PROCEDURE — 99284 EMERGENCY DEPT VISIT MOD MDM: CPT | Mod: ,,, | Performed by: EMERGENCY MEDICINE

## 2023-07-11 PROCEDURE — 63600175 PHARM REV CODE 636 W HCPCS: Performed by: EMERGENCY MEDICINE

## 2023-07-11 PROCEDURE — 96375 TX/PRO/DX INJ NEW DRUG ADDON: CPT

## 2023-07-11 PROCEDURE — 96374 THER/PROPH/DIAG INJ IV PUSH: CPT

## 2023-07-11 PROCEDURE — 80053 COMPREHEN METABOLIC PANEL: CPT | Performed by: EMERGENCY MEDICINE

## 2023-07-11 PROCEDURE — 25000003 PHARM REV CODE 250: Performed by: EMERGENCY MEDICINE

## 2023-07-11 PROCEDURE — 83690 ASSAY OF LIPASE: CPT | Performed by: EMERGENCY MEDICINE

## 2023-07-11 PROCEDURE — 85025 COMPLETE CBC W/AUTO DIFF WBC: CPT | Performed by: EMERGENCY MEDICINE

## 2023-07-11 PROCEDURE — 99284 EMERGENCY DEPT VISIT MOD MDM: CPT | Mod: 25

## 2023-07-11 RX ORDER — DICYCLOMINE HYDROCHLORIDE 20 MG/1
20 TABLET ORAL 2 TIMES DAILY
Qty: 20 TABLET | Refills: 0 | Status: SHIPPED | OUTPATIENT
Start: 2023-07-11 | End: 2023-08-02 | Stop reason: SDUPTHER

## 2023-07-11 RX ORDER — ONDANSETRON 2 MG/ML
4 INJECTION INTRAMUSCULAR; INTRAVENOUS
Status: COMPLETED | OUTPATIENT
Start: 2023-07-11 | End: 2023-07-11

## 2023-07-11 RX ORDER — FAMOTIDINE 10 MG/ML
20 INJECTION INTRAVENOUS
Status: COMPLETED | OUTPATIENT
Start: 2023-07-11 | End: 2023-07-11

## 2023-07-11 RX ADMIN — ONDANSETRON 4 MG: 2 INJECTION INTRAMUSCULAR; INTRAVENOUS at 03:07

## 2023-07-11 RX ADMIN — FAMOTIDINE 20 MG: 10 INJECTION, SOLUTION INTRAVENOUS at 03:07

## 2023-07-11 NOTE — Clinical Note
"Bipin Kellertona Bassett was seen and treated in our emergency department on 7/11/2023.  She may return to work on 07/12/2023.       If you have any questions or concerns, please don't hesitate to call.      madiha RN    "

## 2023-07-11 NOTE — ED PROVIDER NOTES
Encounter Date: 7/11/2023    SCRIBE #1 NOTE: I, Marisol Yuan, am scribing for, and in the presence of,  Madhav Short MD. I have scribed the entire note.     History     Chief Complaint   Patient presents with    Abdominal Pain     Abdominal pain that has been going on since June.  States they have found a mass and she is scheduled for a colonoscopy July 31, 2023 but the pain has gotten unbearable tonight.  States she lost her mother to colon cancer this past Saturday and she knows part of it is stress and worry.  States she is having trouble having bowel movements     Patient is a 62 y.o. female who presents to the emergency department with complaints of abdominal pain. Patient explains that for the past 1 month she has been experiencing lower abdominal pain but she notes that the pain has worsened prior to ED arrival. Patient notes that she was recently informed of a mass and is scheduled for a coloscopy. Patient also notes that she has recently lost around 20 lbs. No other symptoms were reported.    The history is provided by the patient. No  was used.   Review of patient's allergies indicates:   Allergen Reactions    Asa [aspirin] Anaphylaxis    Penicillins Anaphylaxis     History reviewed. No pertinent past medical history.  History reviewed. No pertinent surgical history.  History reviewed. No pertinent family history.  Social History     Tobacco Use    Smoking status: Every Day     Packs/day: 1.00     Types: Cigarettes    Smokeless tobacco: Never   Substance Use Topics    Alcohol use: Yes     Alcohol/week: 2.0 standard drinks     Types: 2 Cans of beer per week     Comment: daily    Drug use: Never     Review of Systems   Constitutional:  Positive for unexpected weight change.   Eyes: Negative.    Gastrointestinal:  Positive for abdominal pain.   Endocrine: Negative.    Skin: Negative.    Allergic/Immunologic: Negative.    Neurological: Negative.    Hematological: Negative.     Psychiatric/Behavioral: Negative.     All other systems reviewed and are negative.    Physical Exam     Initial Vitals [07/11/23 0034]   BP Pulse Resp Temp SpO2   131/89 67 20 98.1 °F (36.7 °C) 100 %      MAP       --         Physical Exam    Nursing note and vitals reviewed.  Constitutional: She appears well-developed and well-nourished.   HENT:   Head: Normocephalic and atraumatic.   Cardiovascular:  Normal rate, regular rhythm and normal heart sounds.           Pulmonary/Chest: Breath sounds normal.   Abdominal: Abdomen is soft. Bowel sounds are normal. There is abdominal tenderness.     Neurological: She is alert and oriented to person, place, and time.   Skin: Skin is warm and dry.   Psychiatric: She has a normal mood and affect. Thought content normal.       ED Course   Procedures  Labs Reviewed   COMPREHENSIVE METABOLIC PANEL - Abnormal; Notable for the following components:       Result Value    BUN 6 (*)     Globulin 4.1 (*)     All other components within normal limits   CBC WITH DIFFERENTIAL - Abnormal; Notable for the following components:    RBC 3.78 (*)     .1 (*)     MCH 34.4 (*)     Neutrophils % 39.6 (*)     Lymphocytes % 46.1 (*)     Monocytes % 9.7 (*)     All other components within normal limits   LIPASE - Normal   CBC W/ AUTO DIFFERENTIAL    Narrative:     The following orders were created for panel order CBC W/ AUTO DIFFERENTIAL.  Procedure                               Abnormality         Status                     ---------                               -----------         ------                     CBC with Differential[[id number]]        Abnormal            Final result                 Please view results for these tests on the individual orders.   URINALYSIS, REFLEX TO URINE CULTURE   EXTRA TUBES    Narrative:     The following orders were created for panel order EXTRA TUBES.  Procedure                               Abnormality         Status                     ---------                                -----------         ------                     Light Blue Top Hold[[id number]]                                                         Light Green Top Hold[[id number]]                             In process                 Light Green Top Hold[[id number]]                                                        Lavender Top Hold[[id number]]                                In process                   Please view results for these tests on the individual orders.   LIGHT GREEN TOP HOLD   LAVENDER TOP HOLD          Imaging Results    None          Medications   famotidine (PF) injection 20 mg (20 mg Intravenous Given 7/11/23 0346)   ondansetron injection 4 mg (4 mg Intravenous Given 7/11/23 0346)     Medical Decision Making:   Initial Assessment:   A 62-year-old female patient came to the emergency department complaining of abdominal pain.  The patient was evaluated recently and had a CT scan of the abdomen and pelvis with IV contrast which showed an abnormal mucosal thickening involving the proximal jejunum .  The patient is scheduled to have colonoscopy by Dr. Boyce (gastroenterology) for further evaluation.  Differential Diagnosis:   Acute peptic disease  MALT  Lymphoma  Clinical Tests:   Lab Tests: Ordered and Reviewed       <> Summary of Lab: Labs Reviewed  COMPREHENSIVE METABOLIC PANEL - Abnormal; Notable for the following components:      Result Value   BUN 6 (*)    Globulin 4.1 (*)    All other components within normal limits  CBC WITH DIFFERENTIAL - Abnormal; Notable for the following components:   RBC 3.78 (*)    .1 (*)    MCH 34.4 (*)    Neutrophils % 39.6 (*)    Lymphocytes % 46.1 (*)    Monocytes % 9.7 (*)    All other components within normal limits  LIPASE - Normal                    ED Management:  In the emergency department the patient received:   Medications  famotidine (PF) injection 20 mg (20 mg IV   ondansetron injection 4 mg IV  We will discharge the patient home to follow  up with outpatient Gastroenterology          Attending Attestation:           Physician Attestation for Scribe:  Physician Attestation Statement for Scribe #1: I, Madhav Short MD, reviewed documentation, as scribed by Marisol Boldne in my presence, and it is both accurate and complete.                        Clinical Impression:   Final diagnoses:  [R10.33] Periumbilical abdominal pain (Primary)        ED Disposition Condition    Discharge Stable          ED Prescriptions       Medication Sig Dispense Start Date End Date Auth. Provider    dicyclomine (BENTYL) 20 mg tablet Take 1 tablet (20 mg total) by mouth 2 (two) times daily. 20 tablet 7/11/2023 8/10/2023 Madhav Short MD          Follow-up Information       Follow up With Specialties Details Why Contact Info    Benjamin Boyce MD Gastroenterology In 3 days For follow-up [address] [place] [address]  [facility] [address]  [phone]               Madhav Short MD  07/12/23 0605

## 2023-07-12 ENCOUNTER — TELEPHONE (OUTPATIENT)
Dept: EMERGENCY MEDICINE | Facility: HOSPITAL | Age: 63
End: 2023-07-12
Payer: COMMERCIAL

## 2023-07-15 ENCOUNTER — HOSPITAL ENCOUNTER (EMERGENCY)
Facility: HOSPITAL | Age: 63
Discharge: HOME | End: 2023-07-16
Attending: EMERGENCY MEDICINE
Payer: COMMERCIAL

## 2023-07-15 DIAGNOSIS — R10.9 ABDOMINAL PAIN: Primary | ICD-10-CM

## 2023-07-15 LAB
ALBUMIN SERPL BCP-MCNC: 3.6 G/DL (ref 3.5–5)
ALBUMIN/GLOB SERPL: 0.9 {RATIO}
ALP SERPL-CCNC: 83 U/L (ref 50–130)
ALT SERPL W P-5'-P-CCNC: 14 U/L (ref 13–56)
ANION GAP SERPL CALCULATED.3IONS-SCNC: 13 MMOL/L (ref 7–16)
AST SERPL W P-5'-P-CCNC: 24 U/L (ref 15–37)
BASOPHILS # BLD AUTO: 0.07 K/UL (ref 0–0.2)
BASOPHILS NFR BLD AUTO: 0.9 % (ref 0–1)
BILIRUB SERPL-MCNC: 0.4 MG/DL (ref ?–1.2)
BUN SERPL-MCNC: 4 MG/DL (ref 7–18)
BUN/CREAT SERPL: 5 (ref 6–20)
CALCIUM SERPL-MCNC: 9.1 MG/DL (ref 8.5–10.1)
CHLORIDE SERPL-SCNC: 97 MMOL/L (ref 98–107)
CO2 SERPL-SCNC: 26 MMOL/L (ref 21–32)
CREAT SERPL-MCNC: 0.73 MG/DL (ref 0.55–1.02)
CTP QC/QA: YES
DIFFERENTIAL METHOD BLD: ABNORMAL
EGFR (NO RACE VARIABLE) (RUSH/TITUS): 92 ML/MIN/1.73M2
EOSINOPHIL # BLD AUTO: 0.22 K/UL (ref 0–0.5)
EOSINOPHIL NFR BLD AUTO: 2.8 % (ref 1–4)
ERYTHROCYTE [DISTWIDTH] IN BLOOD BY AUTOMATED COUNT: 14.3 % (ref 11.5–14.5)
FECAL OCCULT BLOOD, POC: NEGATIVE
GLOBULIN SER-MCNC: 3.9 G/DL (ref 2–4)
GLUCOSE SERPL-MCNC: 86 MG/DL (ref 74–106)
HCT VFR BLD AUTO: 40.3 % (ref 38–47)
HGB BLD-MCNC: 13.8 G/DL (ref 12–16)
IMM GRANULOCYTES # BLD AUTO: 0.01 K/UL (ref 0–0.04)
IMM GRANULOCYTES NFR BLD: 0.1 % (ref 0–0.4)
INDIRECT COOMBS: NORMAL
INR BLD: 1.07
LYMPHOCYTES # BLD AUTO: 3.25 K/UL (ref 1–4.8)
LYMPHOCYTES NFR BLD AUTO: 42.1 % (ref 27–41)
MCH RBC QN AUTO: 34.3 PG (ref 27–31)
MCHC RBC AUTO-ENTMCNC: 34.2 G/DL (ref 32–36)
MCV RBC AUTO: 100.2 FL (ref 80–96)
MONOCYTES # BLD AUTO: 0.88 K/UL (ref 0–0.8)
MONOCYTES NFR BLD AUTO: 11.4 % (ref 2–6)
MPC BLD CALC-MCNC: 10.2 FL (ref 9.4–12.4)
NEUTROPHILS # BLD AUTO: 3.29 K/UL (ref 1.8–7.7)
NEUTROPHILS NFR BLD AUTO: 42.7 % (ref 53–65)
NRBC # BLD AUTO: 0 X10E3/UL
NRBC, AUTO (.00): 0 %
PLATELET # BLD AUTO: 229 K/UL (ref 150–400)
POTASSIUM SERPL-SCNC: 3.4 MMOL/L (ref 3.5–5.1)
PROT SERPL-MCNC: 7.5 G/DL (ref 6.4–8.2)
PROTHROMBIN TIME: 13.8 SECONDS (ref 11.7–14.7)
RBC # BLD AUTO: 4.02 M/UL (ref 4.2–5.4)
RH BLD: NORMAL
SODIUM SERPL-SCNC: 133 MMOL/L (ref 136–145)
SPECIMEN OUTDATE: NORMAL
WBC # BLD AUTO: 7.72 K/UL (ref 4.5–11)

## 2023-07-15 PROCEDURE — 80053 COMPREHEN METABOLIC PANEL: CPT | Performed by: NURSE PRACTITIONER

## 2023-07-15 PROCEDURE — 82272 OCCULT BLD FECES 1-3 TESTS: CPT

## 2023-07-15 PROCEDURE — 85610 PROTHROMBIN TIME: CPT | Performed by: NURSE PRACTITIONER

## 2023-07-15 PROCEDURE — 86900 BLOOD TYPING SEROLOGIC ABO: CPT | Performed by: NURSE PRACTITIONER

## 2023-07-15 PROCEDURE — 85025 COMPLETE CBC W/AUTO DIFF WBC: CPT | Performed by: NURSE PRACTITIONER

## 2023-07-15 PROCEDURE — 99284 PR EMERGENCY DEPT VISIT,LEVEL IV: ICD-10-PCS | Mod: ,,, | Performed by: EMERGENCY MEDICINE

## 2023-07-15 PROCEDURE — 81003 URINALYSIS AUTO W/O SCOPE: CPT | Performed by: NURSE PRACTITIONER

## 2023-07-15 PROCEDURE — 99285 EMERGENCY DEPT VISIT HI MDM: CPT | Mod: 25

## 2023-07-15 PROCEDURE — 99284 EMERGENCY DEPT VISIT MOD MDM: CPT | Mod: ,,, | Performed by: EMERGENCY MEDICINE

## 2023-07-16 VITALS
BODY MASS INDEX: 14.16 KG/M2 | DIASTOLIC BLOOD PRESSURE: 76 MMHG | WEIGHT: 85 LBS | SYSTOLIC BLOOD PRESSURE: 141 MMHG | OXYGEN SATURATION: 100 % | HEIGHT: 65 IN | TEMPERATURE: 98 F | RESPIRATION RATE: 24 BRPM | HEART RATE: 75 BPM

## 2023-07-16 LAB
BILIRUB UR QL STRIP: NEGATIVE
CLARITY UR: CLEAR
COLOR UR: COLORLESS
GLUCOSE UR STRIP-MCNC: NORMAL MG/DL
KETONES UR STRIP-SCNC: NEGATIVE MG/DL
LEUKOCYTE ESTERASE UR QL STRIP: NEGATIVE
NITRITE UR QL STRIP: NEGATIVE
OCCULT BLOOD: NEGATIVE
PH UR STRIP: 5.5 PH UNITS
PROT UR QL STRIP: NEGATIVE
RBC # UR STRIP: NEGATIVE /UL
SP GR UR STRIP: 1
TROPONIN I SERPL DL<=0.01 NG/ML-MCNC: 6.9 PG/ML
UROBILINOGEN UR STRIP-ACNC: NORMAL MG/DL

## 2023-07-16 PROCEDURE — 93010 ELECTROCARDIOGRAM REPORT: CPT | Mod: ,,, | Performed by: HOSPITALIST

## 2023-07-16 PROCEDURE — 93010 EKG 12-LEAD: ICD-10-PCS | Mod: ,,, | Performed by: HOSPITALIST

## 2023-07-16 PROCEDURE — 84484 ASSAY OF TROPONIN QUANT: CPT | Performed by: EMERGENCY MEDICINE

## 2023-07-16 PROCEDURE — 93005 ELECTROCARDIOGRAM TRACING: CPT

## 2023-07-16 NOTE — ED PROVIDER NOTES
Encounter Date: 7/15/2023    SCRIBE #1 NOTE: I, Joyce Hugo, am scribing for, and in the presence of,  Madhav Short MD. I have scribed the entire note.     History     Chief Complaint   Patient presents with    Abdominal Pain     The patient is a 62 y.o. female who presents to the emergency department for abdominal pain onset 5 days ago. The patient was seen in the ED for a similar complaint on 2023, but her symptoms have not improved. She also complains of passing blood in her stools. She admits to being stressed recently due to her mother's passing and  arrangements. A CT scan of her abdomen from last month shows probable jejunum wall thickening. There are no other complaints in the ED at this time.    The history is provided by the patient. No  was used.   Review of patient's allergies indicates:   Allergen Reactions    Asa [aspirin] Anaphylaxis    Penicillins Anaphylaxis     History reviewed. No pertinent past medical history.  History reviewed. No pertinent surgical history.  History reviewed. No pertinent family history.  Social History     Tobacco Use    Smoking status: Every Day     Packs/day: 1.00     Types: Cigarettes    Smokeless tobacco: Never   Substance Use Topics    Alcohol use: Yes     Alcohol/week: 2.0 standard drinks     Types: 2 Cans of beer per week     Comment: daily    Drug use: Never     Review of Systems   Constitutional: Negative.    Eyes: Negative.    Gastrointestinal:  Positive for abdominal pain and blood in stool.   All other systems reviewed and are negative.    Physical Exam     Initial Vitals [07/15/23 2120]   BP Pulse Resp Temp SpO2   (!) 105/55 69 18 98.1 °F (36.7 °C) 99 %      MAP       --         Physical Exam    Nursing note and vitals reviewed.  Constitutional: She appears well-developed and well-nourished.   HENT:   Head: Normocephalic and atraumatic.   Eyes: Conjunctivae and EOM are normal. Pupils are equal, round, and reactive to light.    Neck:   Normal range of motion.  Cardiovascular:  Normal rate, regular rhythm, normal heart sounds and intact distal pulses.           Pulmonary/Chest: Breath sounds normal.   Abdominal: Abdomen is soft. Bowel sounds are normal. There is generalized abdominal tenderness.   Musculoskeletal:         General: Normal range of motion.      Cervical back: Normal range of motion.     Neurological: She is alert and oriented to person, place, and time.   Skin: Skin is warm and dry.   Psychiatric: She has a normal mood and affect. Her behavior is normal. Judgment and thought content normal.       ED Course   Procedures  Labs Reviewed   COMPREHENSIVE METABOLIC PANEL - Abnormal; Notable for the following components:       Result Value    Sodium 133 (*)     Potassium 3.4 (*)     Chloride 97 (*)     BUN 4 (*)     BUN/Creatinine Ratio 5 (*)     All other components within normal limits   CBC WITH DIFFERENTIAL - Abnormal; Notable for the following components:    RBC 4.02 (*)     .2 (*)     MCH 34.3 (*)     Neutrophils % 42.7 (*)     Lymphocytes % 42.1 (*)     Monocytes % 11.4 (*)     Monocytes, Absolute 0.88 (*)     All other components within normal limits   PROTIME-INR - Normal   TROPONIN I - Normal   POCT OCCULT BLOOD (STOOL) - Normal   CBC W/ AUTO DIFFERENTIAL    Narrative:     The following orders were created for panel order CBC auto differential.  Procedure                               Abnormality         Status                     ---------                               -----------         ------                     CBC with Differential[[id number]]        Abnormal            Final result                 Please view results for these tests on the individual orders.   URINALYSIS   TYPE & SCREEN     EKG Readings: (Independently Interpreted)   Rhythm: Normal Sinus Rhythm. Heart Rate: 53.   Interpreted by Madhav Short MD at 12:00.   ECG Results              EKG 12-lead (Final result)  Result time 07/16/23  17:42:43      Final result by Interface, [facility] (07/16/23 17:42:43)                   Narrative:    Test Reason : R10.9,    Vent. Rate : 053 BPM     Atrial Rate : 000 BPM     P-R Int : 150 ms          QRS Dur : 084 ms      QT Int : 478 ms       P-R-T Axes : 068 076 091 degrees     QTc Int : 466 ms    Sinus rhythm  Possible sequence error: V2,V3 omitted  Inferior ST elevation, CONSIDER ACUTE INFARCT      Confirmed by Angelito NIEVES, Talya LEIVA ([id number]) on 7/16/2023 5:42:36 PM    Referred By: [email or web address]   SELF           Confirmed By:Talya Eaton MD                                  Imaging Results              US Pelvis Comp with Transvag NON-OB (xpd (Final result)  Result time 07/16/23 09:34:34   Procedure changed from US Pelvis Complete Non OB     Final result by Matt Valadez MD (07/16/23 09:34:34)                   Impression:      Unremarkable pelvic ultrasound.    Point of Service: [facility]      Electronically signed by: Matt Valadez  Date:    07/16/2023  Time:    09:34               Narrative:    EXAMINATION:  US PELVIS COMP WITH TRANSVAG NON-OB (XPD)    CLINICAL HISTORY:  Pain;    TECHNIQUE:  Multiple longitudinal and transverse real-time sonographic images of the pelvis are obtained using a transabdominal transducer. Additional transvaginal ultrasound images of the pelvis were also obtained.    COMPARISON:  None.    FINDINGS:  The uterus is anteflexed in position and measures 3.7 x 1.4 x 2.4 cm. The uterine contour is smooth and the myometrium is homogeneous in echogenicity. The endometrial stripe measures 0.4 cm in thickness.    The right ovary measures 1.5 x 0.9 x 1 cm and the left ovary is not visualized.  No worrisome adnexal mass is identified. No significant free fluid is demonstrated.  Lots of bowel within the lower abdomen/pelvis which partially obscures detail.                                       US Abdomen Complete (Final result)  Result time 07/16/23 09:30:02       Final result by Matt Valadez MD (07/16/23 09:30:02)                   Impression:      No sonographic abnormality within the abdomen.    [facility] of service[facility]    Images were captured and stored.      Electronically signed by: Matt Valadez  Date:    07/16/2023  Time:    09:30               Narrative:    EXAMINATION:  US ABDOMEN COMPLETE    CLINICAL HISTORY:  Unspecified abdominal pain    TECHNIQUE:  Routine complete abdominal ultrasound was performed.    COMPARISON:  None.    FINDINGS:  The liver is normal in size, measuring 15 cm in length. The liver echogenicity is within normal limits..  There are no liver masses. The portal and hepatic veins are patent and appropriate in direction of flow. There is no biliary ductal dilatation, with the common bile duct measuring 0.4 cm in greatest diameter. There is no cholelithiasis. The gallbladder wall is not thickened.  There is no pericholecystic fluid.    The pancreas appears unremarkable as visualized.  There is no peripancreatic fluid.    The spleen is normal in size, measuring up to 6.5 cm. The spleen was otherwise unremarkable.. No ascites was identified in upper abdomen.    The right kidney is 9 cm in length and the left kidney is 9.2 cm in length. Renal cortical echogenicity is within normal limits.  There are no stones, visualized masses or perinephric fluid collections. There is no evidence of hydronephrosis.    The aorta is visualized, patent and nonaneurysmal.    The inferior vena cava appears patent.                                       X-Ray Chest AP Portable (Final result)  Result time 07/16/23 08:53:12      Final result by Emery Wu DO (07/16/23 08:53:12)                   Impression:      Background of chronic lung change, similar. Lungs are otherwise clear.      Electronically signed by: Emery Wu  Date:    07/16/2023  Time:    08:53               Narrative:    EXAMINATION:  XR CHEST AP PORTABLE    CLINICAL  HISTORY:  gi bleed;    TECHNIQUE:  XR CHEST AP PORTABLE    COMPARISON:  2018    FINDINGS:  No lines or tubes.    Background of chronic lung change, similar.  Lungs are otherwise clear.    Normal pleura.    Cardiac silhouette is similar to comparison exam.    No obvious acute bone findings.                                       Medications - No data to display  Medical Decision Making:   Initial Assessment:   A 62 old female patient came to the emergency department complaining of abdominal pain.  Patient has pain seen by Gastroenterology and she is scheduled to follow up with the.  Physical examination reveals tenderness of the lower abdomen.  Differential Diagnosis:   Acute gastroenteritis  Acute colitis  Chronic constipation  UTI  Clinical Tests:   Lab Tests: Ordered and Reviewed       <> Summary of Lab: Labs Reviewed  CBC WITH DIFFERENTIAL - Abnormal; Notable for the following components:      Result Value   RBC 4.02 (*)    .2 (*)    MCH 34.3 (*)    Neutrophils % 42.7 (*)    Lymphocytes % 42.1 (*)    Monocytes % 11.4 (*)    Monocytes, Absolute 0.88 (*)             Radiological Study: Ordered and Reviewed  ED Management:  Patient's workup is still unremarkable.  She had ultrasound of the abdomen pelvis which did not reveal any acute abnormality.  We will discharge the patient home to follow-up with outpatient Gastroenterology          Attending Attestation:           Physician Attestation for Scribe:  Physician Attestation Statement for Scribe #1: I, Madhav Short MD, reviewed documentation, as scribed by Joyce Hugo in my presence, and it is both accurate and complete.                        Clinical Impression:   Final diagnoses:  [R10.9] Abdominal pain (Primary)        ED Disposition Condition    Discharge Stable          ED Prescriptions    None       Follow-up Information       Follow up With Specialties Details Why Contact Info    Benjamin Boyce MD Gastroenterology In 1 week As scheduled  [address] [place] [address]  [facility] [place] [address]  [phone]               Madhav Short MD  07/16/23 1949

## 2023-07-16 NOTE — ED TRIAGE NOTES
Co of abd pain and bloody stool for 5 days now. Seen in ED on 07/15/2023 for previous problem. Discharged with follow up with GI. States abd pain is worse today with increased bloody stools

## 2023-07-24 ENCOUNTER — PATIENT MESSAGE (OUTPATIENT)
Dept: RESEARCH | Facility: HOSPITAL | Age: 63
End: 2023-07-24

## 2023-07-31 ENCOUNTER — HOSPITAL ENCOUNTER (OUTPATIENT)
Dept: GASTROENTEROLOGY | Facility: HOSPITAL | Age: 63
Discharge: HOME | End: 2023-07-31
Attending: NURSE PRACTITIONER
Payer: COMMERCIAL

## 2023-07-31 ENCOUNTER — PATIENT MESSAGE (OUTPATIENT)
Dept: RESEARCH | Facility: HOSPITAL | Age: 63
End: 2023-07-31

## 2023-07-31 ENCOUNTER — ANESTHESIA EVENT (OUTPATIENT)
Dept: GASTROENTEROLOGY | Facility: HOSPITAL | Age: 63
End: 2023-07-31
Payer: COMMERCIAL

## 2023-07-31 ENCOUNTER — ANESTHESIA (OUTPATIENT)
Dept: GASTROENTEROLOGY | Facility: HOSPITAL | Age: 63
End: 2023-07-31
Payer: COMMERCIAL

## 2023-07-31 VITALS
SYSTOLIC BLOOD PRESSURE: 168 MMHG | HEART RATE: 54 BPM | DIASTOLIC BLOOD PRESSURE: 71 MMHG | RESPIRATION RATE: 21 BRPM | TEMPERATURE: 97 F | OXYGEN SATURATION: 100 %

## 2023-07-31 DIAGNOSIS — K92.1 BLOOD IN STOOL: ICD-10-CM

## 2023-07-31 DIAGNOSIS — Z80.0 FAMILY HISTORY OF COLON CANCER IN MOTHER: ICD-10-CM

## 2023-07-31 DIAGNOSIS — D49.0: ICD-10-CM

## 2023-07-31 DIAGNOSIS — K63.5 POLYP OF SIGMOID COLON, UNSPECIFIED TYPE: Primary | ICD-10-CM

## 2023-07-31 LAB — CEA SERPL-MCNC: 2.6 NG/ML (ref 0–3)

## 2023-07-31 PROCEDURE — D9220A PRA ANESTHESIA: ICD-10-PCS | Mod: ,,, | Performed by: NURSE ANESTHETIST, CERTIFIED REGISTERED

## 2023-07-31 PROCEDURE — 37000009 HC ANESTHESIA EA ADD 15 MINS

## 2023-07-31 PROCEDURE — 45380 COLONOSCOPY AND BIOPSY: CPT | Performed by: INTERNAL MEDICINE

## 2023-07-31 PROCEDURE — 45380 COLONOSCOPY AND BIOPSY: CPT | Mod: ,,, | Performed by: INTERNAL MEDICINE

## 2023-07-31 PROCEDURE — D9220A PRA ANESTHESIA: Mod: ,,, | Performed by: NURSE ANESTHETIST, CERTIFIED REGISTERED

## 2023-07-31 PROCEDURE — 27201423 OPTIME MED/SURG SUP & DEVICES STERILE SUPPLY

## 2023-07-31 PROCEDURE — 82378 CARCINOEMBRYONIC ANTIGEN: CPT | Performed by: INTERNAL MEDICINE

## 2023-07-31 PROCEDURE — 45380 PR COLONOSCOPY,BIOPSY: ICD-10-PCS | Mod: ,,, | Performed by: INTERNAL MEDICINE

## 2023-07-31 PROCEDURE — 37000008 HC ANESTHESIA 1ST 15 MINUTES

## 2023-07-31 PROCEDURE — 27000284 HC CANNULA NASAL: Performed by: NURSE ANESTHETIST, CERTIFIED REGISTERED

## 2023-07-31 PROCEDURE — 63600175 PHARM REV CODE 636 W HCPCS: Performed by: NURSE ANESTHETIST, CERTIFIED REGISTERED

## 2023-07-31 PROCEDURE — 25000003 PHARM REV CODE 250: Performed by: NURSE ANESTHETIST, CERTIFIED REGISTERED

## 2023-07-31 RX ORDER — LIDOCAINE HYDROCHLORIDE 20 MG/ML
INJECTION, SOLUTION EPIDURAL; INFILTRATION; INTRACAUDAL; PERINEURAL
Status: DISCONTINUED | OUTPATIENT
Start: 2023-07-31 | End: 2023-07-31

## 2023-07-31 RX ORDER — PROPOFOL 10 MG/ML
VIAL (ML) INTRAVENOUS
Status: DISCONTINUED | OUTPATIENT
Start: 2023-07-31 | End: 2023-07-31

## 2023-07-31 RX ORDER — SODIUM CHLORIDE 0.9 % (FLUSH) 0.9 %
10 SYRINGE (ML) INJECTION
Status: CANCELLED | OUTPATIENT
Start: 2023-07-31

## 2023-07-31 RX ORDER — SODIUM CHLORIDE 9 MG/ML
INJECTION, SOLUTION INTRAVENOUS CONTINUOUS PRN
Status: DISCONTINUED | OUTPATIENT
Start: 2023-07-31 | End: 2023-07-31

## 2023-07-31 RX ADMIN — PROPOFOL 50 MG: 10 INJECTION, EMULSION INTRAVENOUS at 03:07

## 2023-07-31 RX ADMIN — LIDOCAINE HYDROCHLORIDE 100 MG: 20 INJECTION, SOLUTION INTRAVENOUS at 03:07

## 2023-07-31 RX ADMIN — SODIUM CHLORIDE: 9 INJECTION, SOLUTION INTRAVENOUS at 02:07

## 2023-07-31 NOTE — ANESTHESIA PREPROCEDURE EVALUATION
07/31/2023  Bipin Bassett is a 62 y.o., female.    Social History     Socioeconomic History    Marital status: Single   Tobacco Use    Smoking status: Every Day     Current packs/day: 1.00     Types: Cigarettes    Smokeless tobacco: Never   Substance and Sexual Activity    Alcohol use: Yes     Alcohol/week: 2.0 standard drinks of alcohol     Types: 2 Cans of beer per week     Comment: daily    Drug use: Never     Pre-op Assessment    I have reviewed the Patient Summary Reports.     I have reviewed the Nursing Notes. I have reviewed the NPO Status.   I have reviewed the Medications.     Review of Systems  Anesthesia Hx:  No problems with previous Anesthesia    Social:  Smoker    Hepatic/GI:   Hiatal Hernia,    Neurological:   Neuromuscular Disease,        Physical Exam  General: Cooperative, Well nourished, Alert and Oriented    Airway:  Mallampati: II       Chest/Lungs:  Clear to auscultation    Heart:  Rate: Normal        Anesthesia Plan  Type of Anesthesia, risks & benefits discussed:    Anesthesia Type: Gen Natural Airway, MAC  Intra-op Monitoring Plan: Standard ASA Monitors  Post Op Pain Control Plan: multimodal analgesia and IV/PO Opioids PRN  Induction:  IV  Informed Consent: Informed consent signed with the Patient and all parties understand the risks and agree with anesthesia plan.  All questions answered. Patient consented to blood products? Yes  ASA Score: 2  Day of Surgery Review of History & Physical: I have interviewed and examined the patient. I have reviewed the patient's H&P dated: There are no significant changes.     Ready For Surgery From Anesthesia Perspective.     .

## 2023-07-31 NOTE — DISCHARGE INSTRUCTIONS
Procedure Date  7/31/23     Impression  Overall Impression:   Polyp in the descending colon was removed with cold forceps biopsy  Performed forceps biopsies in the rectum  A malignant appearing rectal tumor is noted at 9cm from the anus and biopsies were obtained.   A diminutive polyp was removed from the sigmoid colon with biopsy.     Recommendation    Await pathology results     Repeat colonoscopy in 1 year      Draw CEA; anticipate referral to oncology clinic when biopsies return.    Discharge:  Disp: DC to home. Resume diet. No driving x 24 hours. F/U with PCP and return to GI clinic in 1 month with MIRI Clark.  Dx: One polyp was removed on this exam; a rectal tumor was biopsied; family hx of colon cancer in mother.

## 2023-07-31 NOTE — H&P
[facility]  Gastroenterology  H&P    Patient Name: Bipin Bassett  MRN: [id number]  Admission Date: 7/31/2023  Code Status: Prior    Attending Provider: PETRA Hunt   Primary Care Physician: Primary Doctor No  Principal Problem:<principal problem not specified>    Subjective:     History of Present Illness: Pt has family history of colon cancer in her mother. Date of last colonoscopy is unknown.    Past Medical History:   Diagnosis Date    IBS (irritable bowel syndrome)        History reviewed. No pertinent surgical history.    Review of patient's allergies indicates:   Allergen Reactions    Asa [aspirin] Anaphylaxis    Penicillins Anaphylaxis     Family History    None       Tobacco Use    Smoking status: Every Day     Current packs/day: 1.00     Average packs/day: 1 pack/day for 50.0 years (50.0 ttl pk-yrs)     Types: Cigarettes     Start date: 7/31/1973    Smokeless tobacco: Never   Substance and Sexual Activity    Alcohol use: Yes     Alcohol/week: 2.0 standard drinks of alcohol     Types: 2 Cans of beer per week     Comment: daily    Drug use: Never    Sexual activity: Not on file     Review of Systems   Respiratory: Negative.     Cardiovascular: Negative.    Gastrointestinal: Negative.      Objective:     Vital Signs (Most Recent):  Pulse: 64 (07/31/23 1339)  Resp: (!) 23 (07/31/23 1339)  BP: (!) 151/77 (07/31/23 1339)  SpO2: 99 % (07/31/23 1339) Vital Signs (24h Range):  Pulse:  [64] 64  Resp:  [23] 23  SpO2:  [99 %] 99 %  BP: (151)/(77) 151/77        There is no height or weight on file to calculate BMI.    No intake or output data in the 24 hours ending 07/31/23 1501    Lines/Drains/Airways       Peripheral Intravenous Line  Duration                  Peripheral IV - Single Lumen 07/31/23 1350 22 G Right Antecubital <1 day                    Physical Exam  Vitals reviewed.   Constitutional:       General: She is not in acute distress.     Appearance: Normal appearance. She is  "well-developed. She is not ill-appearing.   HENT:      Head: Normocephalic and atraumatic.      Nose: Nose normal.   Eyes:      Pupils: Pupils are equal, round, and reactive to light.   Cardiovascular:      Rate and Rhythm: Normal rate and regular rhythm.   Pulmonary:      Effort: Pulmonary effort is normal.      Breath sounds: Normal breath sounds. No wheezing.   Abdominal:      General: Abdomen is flat. Bowel sounds are normal. There is no distension.      Palpations: Abdomen is soft.      Tenderness: There is no abdominal tenderness. There is no guarding.   Skin:     General: Skin is warm and dry.      Coloration: Skin is not jaundiced.   Neurological:      Mental Status: She is alert.   Psychiatric:         Attention and Perception: Attention normal.         Mood and Affect: Affect normal.         Speech: Speech normal.         Behavior: Behavior is cooperative.      Comments: Pt was calm while speaking.         Significant Labs:  CBC: No results for input(s): "WBC", "HGB", "HCT", "PLT" in the last 48 hours.  CMP: No results for input(s): "GLU", "CALCIUM", "ALBUMIN", "PROT", "NA", "K", "CO2", "CL", "BUN", "CREATININE", "ALKPHOS", "ALT", "AST", "BILITOT" in the last 48 hours.    Significant Imaging:  Imaging results within the past 24 hours have been reviewed.    Assessment/Plan:     There are no hospital problems to display for this patient.        Imp: family history of colon cancer  Plan: colonoscopy    Benjamin Boyce MD  Gastroenterology  [facility]  "

## 2023-07-31 NOTE — TRANSFER OF CARE
Anesthesia Transfer of Care Note    Patient: Bipin Bassett    Procedure(s) Performed: * No procedures listed *    Patient location: GI    Anesthesia Type: general    Transport from OR: Transported from OR on room air with adequate spontaneous ventilation    Post pain: adequate analgesia    Post assessment: no apparent anesthetic complications    Post vital signs: stable    Level of consciousness: responds to stimulation    Nausea/Vomiting: no nausea/vomiting    Complications: none    Transfer of care protocol was followed      Last vitals:   Visit Vitals  BP (!) 144/72 (BP Location: Left arm, Patient Position: Lying)   Pulse (!) 57   Temp 36.1 °C (97 °F)   Resp (!) 26   LMP  (LMP Unknown)   SpO2 100%   Breastfeeding No

## 2023-07-31 NOTE — ANESTHESIA POSTPROCEDURE EVALUATION
Anesthesia Post Evaluation    Patient: Bipin Bassett    Procedure(s) Performed: * No procedures listed *    Final Anesthesia Type: general      Patient location during evaluation: GI PACU  Patient participation: Yes- Able to Participate  Level of consciousness: awake and alert  Post-procedure vital signs: reviewed and stable  Pain management: adequate  Airway patency: patent    PONV status at discharge: No PONV  Anesthetic complications: no      Cardiovascular status: blood pressure returned to baseline and hemodynamically stable  Respiratory status: spontaneous ventilation and unassisted  Hydration status: euvolemic  Follow-up not needed.          Vitals Value Taken Time   /72 07/31/23 1529   Temp 36.1 °C (97 °F) 07/31/23 1529   Pulse 55 07/31/23 1530   Resp 24 07/31/23 1530   SpO2 100 % 07/31/23 1530   Vitals shown include unvalidated device data.      No case tracking events are documented in the log.      Pain/Talha Score: Talha Score: 8 (7/31/2023  3:23 PM)

## 2023-08-02 DIAGNOSIS — K58.1 IRRITABLE BOWEL SYNDROME WITH CONSTIPATION: ICD-10-CM

## 2023-08-02 DIAGNOSIS — C20 RECTAL CANCER: Primary | ICD-10-CM

## 2023-08-02 DIAGNOSIS — K59.00 CONSTIPATION, UNSPECIFIED CONSTIPATION TYPE: Primary | ICD-10-CM

## 2023-08-02 LAB
DHEA SERPL-MCNC: NORMAL
ESTROGEN SERPL-MCNC: NORMAL PG/ML
INSULIN SERPL-ACNC: NORMAL U[IU]/ML
LAB AP GROSS DESCRIPTION: NORMAL
LAB AP LABORATORY NOTES: NORMAL
T3RU NFR SERPL: NORMAL %

## 2023-08-02 RX ORDER — LACTULOSE 10 G/15ML
10 SOLUTION ORAL 2 TIMES DAILY PRN
Qty: 420 ML | Refills: 0 | Status: ON HOLD | OUTPATIENT
Start: 2023-08-02 | End: 2023-08-30

## 2023-08-02 RX ORDER — DICYCLOMINE HYDROCHLORIDE 20 MG/1
20 TABLET ORAL 2 TIMES DAILY
Qty: 60 TABLET | Refills: 0 | Status: ON HOLD | OUTPATIENT
Start: 2023-08-02 | End: 2023-08-30

## 2023-08-02 NOTE — PROGRESS NOTES
I called pt with pathology results and ordered a referral to  in Gen.Surgery clinic. We'll need to follow-up and make sure that patient gets that appointment.

## 2023-08-07 ENCOUNTER — HOSPITAL ENCOUNTER (EMERGENCY)
Facility: HOSPITAL | Age: 63
Discharge: HOME | End: 2023-08-08
Attending: FAMILY MEDICINE
Payer: COMMERCIAL

## 2023-08-07 VITALS
DIASTOLIC BLOOD PRESSURE: 63 MMHG | WEIGHT: 85 LBS | BODY MASS INDEX: 14.16 KG/M2 | HEIGHT: 65 IN | RESPIRATION RATE: 18 BRPM | OXYGEN SATURATION: 97 % | HEART RATE: 63 BPM | SYSTOLIC BLOOD PRESSURE: 110 MMHG

## 2023-08-07 DIAGNOSIS — R10.13 EPIGASTRIC ABDOMINAL PAIN: Primary | ICD-10-CM

## 2023-08-07 DIAGNOSIS — R11.2 NAUSEA AND VOMITING, UNSPECIFIED VOMITING TYPE: ICD-10-CM

## 2023-08-07 LAB
BASOPHILS # BLD AUTO: 0.06 K/UL (ref 0–0.2)
BASOPHILS NFR BLD AUTO: 0.9 % (ref 0–1)
BILIRUB UR QL STRIP: NEGATIVE
CLARITY UR: CLEAR
COLOR UR: COLORLESS
DIFFERENTIAL METHOD BLD: ABNORMAL
EOSINOPHIL # BLD AUTO: 0.17 K/UL (ref 0–0.5)
EOSINOPHIL NFR BLD AUTO: 2.5 % (ref 1–4)
ERYTHROCYTE [DISTWIDTH] IN BLOOD BY AUTOMATED COUNT: 14 % (ref 11.5–14.5)
GLUCOSE UR STRIP-MCNC: NORMAL MG/DL
HCT VFR BLD AUTO: 40.4 % (ref 38–47)
HGB BLD-MCNC: 13.4 G/DL (ref 12–16)
IMM GRANULOCYTES # BLD AUTO: 0.02 K/UL (ref 0–0.04)
IMM GRANULOCYTES NFR BLD: 0.3 % (ref 0–0.4)
KETONES UR STRIP-SCNC: NEGATIVE MG/DL
LEUKOCYTE ESTERASE UR QL STRIP: NEGATIVE
LYMPHOCYTES # BLD AUTO: 2.29 K/UL (ref 1–4.8)
LYMPHOCYTES NFR BLD AUTO: 33.8 % (ref 27–41)
MCH RBC QN AUTO: 33.7 PG (ref 27–31)
MCHC RBC AUTO-ENTMCNC: 33.2 G/DL (ref 32–36)
MCV RBC AUTO: 101.5 FL (ref 80–96)
MONOCYTES # BLD AUTO: 0.82 K/UL (ref 0–0.8)
MONOCYTES NFR BLD AUTO: 12.1 % (ref 2–6)
MPC BLD CALC-MCNC: 10.2 FL (ref 9.4–12.4)
NEUTROPHILS # BLD AUTO: 3.41 K/UL (ref 1.8–7.7)
NEUTROPHILS NFR BLD AUTO: 50.4 % (ref 53–65)
NITRITE UR QL STRIP: NEGATIVE
NRBC # BLD AUTO: 0 X10E3/UL
NRBC, AUTO (.00): 0 %
PH UR STRIP: 5.5 PH UNITS
PLATELET # BLD AUTO: 216 K/UL (ref 150–400)
PROT UR QL STRIP: NEGATIVE
RBC # BLD AUTO: 3.98 M/UL (ref 4.2–5.4)
RBC # UR STRIP: NEGATIVE /UL
SP GR UR STRIP: 1
UROBILINOGEN UR STRIP-ACNC: NORMAL MG/DL
WBC # BLD AUTO: 6.77 K/UL (ref 4.5–11)

## 2023-08-07 PROCEDURE — 99284 PR EMERGENCY DEPT VISIT,LEVEL IV: ICD-10-PCS | Mod: ,,, | Performed by: FAMILY MEDICINE

## 2023-08-07 PROCEDURE — 80053 COMPREHEN METABOLIC PANEL: CPT | Performed by: NURSE PRACTITIONER

## 2023-08-07 PROCEDURE — 96375 TX/PRO/DX INJ NEW DRUG ADDON: CPT

## 2023-08-07 PROCEDURE — 99284 EMERGENCY DEPT VISIT MOD MDM: CPT

## 2023-08-07 PROCEDURE — 63600175 PHARM REV CODE 636 W HCPCS: Performed by: NURSE PRACTITIONER

## 2023-08-07 PROCEDURE — 96374 THER/PROPH/DIAG INJ IV PUSH: CPT

## 2023-08-07 PROCEDURE — 81003 URINALYSIS AUTO W/O SCOPE: CPT | Performed by: NURSE PRACTITIONER

## 2023-08-07 PROCEDURE — 99284 EMERGENCY DEPT VISIT MOD MDM: CPT | Mod: ,,, | Performed by: FAMILY MEDICINE

## 2023-08-07 PROCEDURE — 85025 COMPLETE CBC W/AUTO DIFF WBC: CPT | Performed by: NURSE PRACTITIONER

## 2023-08-07 RX ORDER — ONDANSETRON 2 MG/ML
4 INJECTION INTRAMUSCULAR; INTRAVENOUS
Status: COMPLETED | OUTPATIENT
Start: 2023-08-07 | End: 2023-08-07

## 2023-08-07 RX ORDER — MORPHINE SULFATE 4 MG/ML
4 INJECTION, SOLUTION INTRAMUSCULAR; INTRAVENOUS
Status: COMPLETED | OUTPATIENT
Start: 2023-08-07 | End: 2023-08-07

## 2023-08-07 RX ADMIN — MORPHINE SULFATE 4 MG: 4 INJECTION, SOLUTION INTRAMUSCULAR; INTRAVENOUS at 11:08

## 2023-08-07 RX ADMIN — ONDANSETRON HYDROCHLORIDE 4 MG: 2 SOLUTION INTRAMUSCULAR; INTRAVENOUS at 11:08

## 2023-08-07 NOTE — Clinical Note
"Bipin Kellertona Bassett was seen and treated in our emergency department on 8/7/2023.  She may return to work on 08/09/2023.       If you have any questions or concerns, please don't hesitate to call.      YUKO BOOKER    "

## 2023-08-08 ENCOUNTER — TELEPHONE (OUTPATIENT)
Dept: EMERGENCY MEDICINE | Facility: HOSPITAL | Age: 63
End: 2023-08-08
Payer: COMMERCIAL

## 2023-08-08 LAB
ALBUMIN SERPL BCP-MCNC: 3.3 G/DL (ref 3.5–5)
ALBUMIN/GLOB SERPL: 0.8 {RATIO}
ALP SERPL-CCNC: 75 U/L (ref 50–130)
ALT SERPL W P-5'-P-CCNC: 18 U/L (ref 13–56)
ANION GAP SERPL CALCULATED.3IONS-SCNC: 11 MMOL/L (ref 7–16)
AST SERPL W P-5'-P-CCNC: 22 U/L (ref 15–37)
BILIRUB SERPL-MCNC: 0.5 MG/DL (ref ?–1.2)
BUN SERPL-MCNC: 4 MG/DL (ref 7–18)
BUN/CREAT SERPL: 7 (ref 6–20)
CALCIUM SERPL-MCNC: 8.9 MG/DL (ref 8.5–10.1)
CHLORIDE SERPL-SCNC: 107 MMOL/L (ref 98–107)
CO2 SERPL-SCNC: 24 MMOL/L (ref 21–32)
CREAT SERPL-MCNC: 0.59 MG/DL (ref 0.55–1.02)
EGFR (NO RACE VARIABLE) (RUSH/TITUS): 101 ML/MIN/1.73M2
GLOBULIN SER-MCNC: 4 G/DL (ref 2–4)
GLUCOSE SERPL-MCNC: 68 MG/DL (ref 74–106)
POTASSIUM SERPL-SCNC: 3.2 MMOL/L (ref 3.5–5.1)
PROT SERPL-MCNC: 7.3 G/DL (ref 6.4–8.2)
SODIUM SERPL-SCNC: 139 MMOL/L (ref 136–145)

## 2023-08-08 RX ORDER — HYDROCODONE BITARTRATE AND ACETAMINOPHEN 7.5; 325 MG/1; MG/1
1 TABLET ORAL EVERY 6 HOURS PRN
Qty: 12 TABLET | Refills: 0 | Status: SHIPPED | OUTPATIENT
Start: 2023-08-08 | End: 2023-08-20

## 2023-08-08 RX ORDER — ONDANSETRON 4 MG/1
4 TABLET, FILM COATED ORAL EVERY 6 HOURS
Qty: 30 TABLET | Refills: 0 | Status: SHIPPED | OUTPATIENT
Start: 2023-08-08 | End: 2023-08-16

## 2023-08-08 NOTE — ED PROVIDER NOTES
Encounter Date: 8/7/2023       History     Chief Complaint   Patient presents with    Abdominal Pain     Pt c/o abdominal pain pt states recent dx of rectal cancer. Pt states medication is not helping her pain.      61 y/o AAF presents to the emergency department with c/o abdominal pain. States she was recently diagnosed with rectal cancer. She states she was started on Lactulose and Bentyl and it has not helped. She reports pain is on right side and suprapubic area. She states she is also having intermittent bloody bowel movements. She states she is awaiting an appointment with Dr. العلي. She knows of no exacerbating or remitting factors.     The history is provided by the patient.     Review of patient's allergies indicates:   Allergen Reactions    Asa [aspirin] Anaphylaxis    Penicillins Anaphylaxis     Past Medical History:   Diagnosis Date    IBS (irritable bowel syndrome)      No past surgical history on file.  Family History   Problem Relation Age of Onset    Cancer Mother     Cancer Father     Cancer Maternal Grandmother      Social History     Tobacco Use    Smoking status: Every Day     Current packs/day: 1.00     Average packs/day: 1 pack/day for 50.1 years (50.1 ttl pk-yrs)     Types: Cigarettes     Start date: 7/31/1973    Smokeless tobacco: Never   Substance Use Topics    Alcohol use: Yes     Alcohol/week: 2.0 standard drinks of alcohol     Types: 2 Cans of beer per week     Comment: daily    Drug use: Never     Review of Systems   All other systems reviewed and are negative.      Physical Exam     Initial Vitals [08/07/23 2207]   BP Pulse Resp Temp SpO2   110/63 63 18 -- 97 %      MAP       --         Physical Exam    Constitutional: She is active and cooperative. She has a sickly appearance.   underweight   Cardiovascular:  Normal rate, regular rhythm, normal heart sounds and normal pulses.           Pulmonary/Chest: Effort normal and breath sounds normal.   Abdominal: Abdomen is soft. Bowel sounds  are decreased. There is abdominal tenderness.     Neurological: She is alert and oriented to person, place, and time. GCS eye subscore is 4. GCS verbal subscore is 5. GCS motor subscore is 6.   Skin: Skin is warm, dry and intact. Capillary refill takes less than 2 seconds.         Medical Screening Exam   See Full Note    ED Course   Procedures  Labs Reviewed   COMPREHENSIVE METABOLIC PANEL - Abnormal; Notable for the following components:       Result Value    Potassium 3.2 (*)     Glucose 68 (*)     BUN 4 (*)     Albumin 3.3 (*)     All other components within normal limits   CBC WITH DIFFERENTIAL - Abnormal; Notable for the following components:    RBC 3.98 (*)     .5 (*)     MCH 33.7 (*)     Neutrophils % 50.4 (*)     Monocytes % 12.1 (*)     Monocytes, Absolute 0.82 (*)     All other components within normal limits   URINALYSIS, REFLEX TO URINE CULTURE   CBC W/ AUTO DIFFERENTIAL    Narrative:     The following orders were created for panel order CBC auto differential.  Procedure                               Abnormality         Status                     ---------                               -----------         ------                     CBC with Differential[[id number]]        Abnormal            Final result                 Please view results for these tests on the individual orders.   EXTRA TUBES    Narrative:     The following orders were created for panel order EXTRA TUBES.  Procedure                               Abnormality         Status                     ---------                               -----------         ------                     Gold Top Hold[[id number]]                                    In process                   Please view results for these tests on the individual orders.   GOLD TOP HOLD          Imaging Results              X-Ray KUB (Final result)  Result time 08/08/23 07:55:50      Final result by Zion Martin II, MD (08/08/23 07:55:50)                   Impression:       Increased stool volume in the colon, may indicate constipation.      Electronically signed by: Zion Mario  Date:    2023  Time:    07:55               Narrative:    EXAMINATION:  XR KUB    CLINICAL HISTORY:  abdominal pain;    COMPARISON:  15 May 2023    FINDINGS:  No free fluid or free air seen.  Increased stool volume is seen in the colon.  The bowel gas pattern otherwise appears within normal limits.  No abnormal calcifications are present.  No other abnormality is identified.                                       Medications   morphine injection 4 mg (4 mg Intravenous Given 23)   ondansetron injection 4 mg (4 mg Intravenous Given 23)     Medical Decision Making:   Initial Assessment:   63 y/o AAF presents to the emergency department with c/o abdominal pain. States she was recently diagnosed with rectal cancer. She states she was started on Lactulose and Bentyl and it has not helped. She reports pain is on right side and suprapubic area. She states she is also having intermittent bloody bowel movements. She states she is awaiting an appointment with Dr. العلي. She knows of no exacerbating or remitting factors.   Differential Diagnosis:   Rectal mass  Anemia  Constipation  UTI  Vs other  Clinical Tests:   Lab Tests: Ordered  Radiological Study: Ordered and Reviewed  ED Management:  Morphine and zofran given for pain/nausea.  Care transferred to Dr. Cantu.                         Clinical Impression:   Final diagnoses:  [R10.13] Epigastric abdominal pain (Primary)  [R11.2] Nausea and vomiting, unspecified vomiting type        ED Disposition Condition    Discharge Stable          ED Prescriptions       Medication Sig Dispense Start Date End Date Auth. Provider    HYDROcodone-acetaminophen (NORCO) 7.5-325 mg per tablet () Take 1 tablet by mouth every 6 (six) hours as needed for Pain. Patient not taking:  Reported on 8/15/2023 12 tablet 2023 Peter Cantu,  DO    ondansetron (ZOFRAN) 4 MG tablet () Take 1 tablet (4 mg total) by mouth every 6 (six) hours. for 30 doses 30 tablet 2023 Peter Cantu, DO          Follow-up Information    None          Peter Cantu,   23 2089

## 2023-08-15 ENCOUNTER — OFFICE VISIT (OUTPATIENT)
Dept: SURGERY | Facility: CLINIC | Age: 63
End: 2023-08-15
Attending: SURGERY
Payer: COMMERCIAL

## 2023-08-15 DIAGNOSIS — R63.4 WEIGHT LOSS: ICD-10-CM

## 2023-08-15 DIAGNOSIS — C20 RECTAL CANCER: Primary | ICD-10-CM

## 2023-08-15 DIAGNOSIS — K92.1 BLOOD IN STOOL: ICD-10-CM

## 2023-08-15 PROCEDURE — 99205 OFFICE O/P NEW HI 60 MIN: CPT | Mod: S$PBB,,, | Performed by: SURGERY

## 2023-08-15 PROCEDURE — 99205 PR OFFICE/OUTPT VISIT, NEW, LEVL V, 60-74 MIN: ICD-10-PCS | Mod: S$PBB,,, | Performed by: SURGERY

## 2023-08-15 PROCEDURE — 99214 OFFICE O/P EST MOD 30 MIN: CPT | Mod: PBBFAC | Performed by: SURGERY

## 2023-08-15 PROCEDURE — 1159F PR MEDICATION LIST DOCUMENTED IN MEDICAL RECORD: ICD-10-PCS | Mod: CPTII,,, | Performed by: SURGERY

## 2023-08-15 PROCEDURE — 1159F MED LIST DOCD IN RCRD: CPT | Mod: CPTII,,, | Performed by: SURGERY

## 2023-08-15 RX ORDER — METRONIDAZOLE 500 MG/1
TABLET ORAL
Qty: 2 TABLET | Refills: 0 | Status: ON HOLD | OUTPATIENT
Start: 2023-08-29 | End: 2023-09-05 | Stop reason: HOSPADM

## 2023-08-15 RX ORDER — CIPROFLOXACIN 500 MG/1
TABLET ORAL
Qty: 2 TABLET | Refills: 0 | Status: ON HOLD | OUTPATIENT
Start: 2023-08-15 | End: 2023-08-30

## 2023-08-15 NOTE — ASSESSMENT & PLAN NOTE
Should be resectable by Low Anterior Resection, but discussed possibility of APR.  We will rigid proctoscopy at the time of surgery to ensure that the lesion is high enough for low anterior.  She understands R/b to include infection, bleeding, anastomotic leak, hernia, the possibility that an ABR permanent ostomy may be necessary, wishes to proceed.

## 2023-08-15 NOTE — PROGRESS NOTES
Subjective:       Patient ID: Bipin Bassett is a 62 y.o. female.    Chief Complaint: Rectal Bleeding and rectal mass    62-year-old female patient who was recently diagnosed with adenocarcinoma of the rectum.  The malignant appearing polyp located roughly 9 cm from anal verge, endoscopy.  She had a CT scan a few months ago which did not reveal any evidence of metastatic disease.  The patient had been experiencing some weight loss as well as bloody stool, but is not anemic.  She was referred for potential surgical resection.    Positive family history for cancer (mom recently )    Rectal Bleeding        family history includes Cancer in her father, maternal grandmother, and mother.  Past Medical History:   Diagnosis Date    IBS (irritable bowel syndrome)       History reviewed. No pertinent surgical history.    reports that she has been smoking cigarettes. She started smoking about 50 years ago. She has a 50.0 pack-year smoking history. She has never used smokeless tobacco. She reports current alcohol use of about 2.0 standard drinks of alcohol per week. She reports that she does not use drugs.     Review of Systems   Gastrointestinal:  Positive for hematochezia.   All other systems reviewed and are negative.         Objective:      LMP  (LMP Unknown)    Physical Exam  Cardiovascular:      Rate and Rhythm: Normal rate.      Pulses: Normal pulses.   Pulmonary:      Effort: Pulmonary effort is normal.   Abdominal:      Palpations: Abdomen is soft.   Genitourinary:     Comments: Its rectal exam significant for stool some blood.  The mass was not definitely palpable due to large amount of stool within the rectal vault.  Musculoskeletal:         General: No swelling.   Skin:     Coloration: Skin is not jaundiced.   Neurological:      General: No focal deficit present.      Mental Status: She is alert.   Psychiatric:         Mood and Affect: Mood normal.           Assessment/Plan:         Rectal cancer  -      Case Request Operating Room: EXAM UNDER ANESTHESIA, DIGITAL, RECTUM, RESECTION, RECTUM, LOW ANTERIOR, PROCTOSCOPY    Weight loss  -     Case Request Operating Room: EXAM UNDER ANESTHESIA, DIGITAL, RECTUM, RESECTION, RECTUM, LOW ANTERIOR, PROCTOSCOPY    Blood in stool  -     Case Request Operating Room: EXAM UNDER ANESTHESIA, DIGITAL, RECTUM, RESECTION, RECTUM, LOW ANTERIOR, PROCTOSCOPY         Problem List Items Addressed This Visit          Oncology    Rectal cancer - Primary    Current Assessment & Plan     Should be resectable by Low Anterior Resection, but discussed possibility of APR.  She understands R/b to include infection, bleeding, anastomotic leak, hernia, the possibility that an ABR permanent ostomy may be necessary, wishes to proceed.         Relevant Orders    Case Request Operating Room: EXAM UNDER ANESTHESIA, DIGITAL, RECTUM, RESECTION, RECTUM, LOW ANTERIOR, PROCTOSCOPY (Completed)       Endocrine    Weight loss    Relevant Orders    Case Request Operating Room: EXAM UNDER ANESTHESIA, DIGITAL, RECTUM, RESECTION, RECTUM, LOW ANTERIOR, PROCTOSCOPY (Completed)       GI    Blood in stool    Relevant Orders    Case Request Operating Room: EXAM UNDER ANESTHESIA, DIGITAL, RECTUM, RESECTION, RECTUM, LOW ANTERIOR, PROCTOSCOPY (Completed)

## 2023-08-15 NOTE — PATIENT INSTRUCTIONS
[facility]      Your surgery is scheduled for 8/30/23 at [facility] on the ground floor of the Ambulatory building. You should arrive at 0530 at the Ambulatory Care Center located at [address].                                                                                                                                                                                                                                                                                                                                                           Preoperative Instructions        Your pre-op lab work will be on 8/28/23 on the 1st floor of the [facility] building.  EKG is located on 2nd floor of [facility].                                                                                                                                             Day of Surgery Instructions      Bring a list of all your medications with you the day of your surgery. You can also give the list to your doctor or nurse during your final clinic appointment before surgery.    Stop taking all herbal medications 14 days prior to surgery.  Stop drinking alcoholic beverages for 24 hours before surgery. Do not drink alcohol for 24 hours after surgery.  Eat a light supper on the night before your surgery.    Do not eat any solid foods or drink any liquids after 12:00 AM (midnight). This includes gum, hard candy, mints, and chewing tobacco.    Medications: Take any medications specified with a small sip of water the morning of your surgery.  Brush your teeth: You may brush your teeth and rinse your mouth. Do not swallow any water or toothpaste.  Clothing: A button front shirt and loose-fitting clothes are the most comfortable before and after surgery. We also recommend low-heeled shoes.  Hair: Avoid buns, ponytails, or hairpieces at the back of the head. Remove or avoid any clips, pins or bands that bind  hair. Do not use hairspray. Before going to surgery, you will need to remove any wigs or hairpieces.  We will cover your hair during surgery. Your privacy regarding personal appearance will be respected.  Fingernails: Please be sure to remove all nail polish before you arrive for surgery. We understand that tips, wraps, gels, etc., are expensive; however, we ask these products to be removed from at least one finger on each hand. Your fingertips are used to accurately monitor your oxygen level during surgery by a device called an oximeter.  Glasses and Contact Lenses: Wear glasses when possible. If contact lenses must be worn, bring a lens case and solution. If glasses are worn, bring a case for them.  Hearing Aids: If you rely on a hearing aid, wear it to the hospital on the day of surgery. This will ensure you can hear and understand everything we need to communicate with you.  Valuables: Jewelry, including body piercings, Dentures, money, and credit cards should be left at home. [facility] is not responsible for valuables that are not secured in our surgery center.  Makeup, Perfume, Creams, Lotions and Deodorants: Do not use any of these products on the day of surgery. Remove false eyelashes prior to surgery.  Implanted Medical Devices: If you have an implanted device, such as a pacemaker or AICD, bring the device information card (if you have it) with you.  Medical Equipment: If you have been fitted for a brace to wear after surgery or you have been given crutches, bring those with you to the surgery center.  Shower: Take a shower with Hibiclens® (chlorhexidine) (available over the counter). This reduces the chance of infection. PLEASE USE CHLORHEXIDINE WASH THE NIGHT BEFORE SURGERY AND THE MORNING OF SURGERY.      If you are diabetic      Follow the diabetic medicine instructions you received during your pre-operative visit.  DO NOT take your insulin or diabetic medications the morning of surgery.  When you arrive  at the surgical center, be sure to tell the nurse you are diabetic.            Other Items to bring with you and know      Insurance card  Identification card such as 's license, passport, or other picture ID  Copy of your advance directives  List of medications and allergies, if not already provided  Name and phone number of person to contact if your condition changes significantly. YOU CANNOT DRIVE YOURSELF HOME FROM THE HOSPITAL THE DAY OF SURGERY.  PLEASE UNDERSTAND THAT OUR OFFICE DOES NOT GIVE PATHOLOGY RESULTS OR TEST RESULTS OVER THE PHONE. THIS WILL BE DISCUSSED WITH YOU ON YOUR FOLLOW UP APPOINTMENT.        Medication instructions:  You may take blood pressure medication with a small drink of water the morning of surgery.    The following blood sugar medications have to be stopped prior to surgery:    Metformin, Glucovance, Metaglip, Fortamet, Glucophage, Riomet, Avandamet, Glimepiride    IF YOU ARE ON ANY OF THESE BLOOD THINNERS, MAKE SURE YOUR PHYSICIAN IS AWARE.  Eliquis/Apixaban         Wafarin/Coumadin,Jantoven  Xarelto/Rivaroxaban   Pletal/Cilostazol  Plavix/Clopidogrel                                                              Pradaxa/Dibigatran        Alcohol and Surgery  We want to help you prepare for and recover from surgery as quickly and safely as possible. Be open and honest with your provider about how many drinks you have per day. Excessive alcohol use is defined as drinking more than three drinks per day. It can affect the outcome of your surgery. Binge drinking (consuming large amounts of alcohol infrequently, such as on weekends) can also affect the outcome of your surgery.  Alcohol withdrawal  If you drink more than three drinks a day, you could have a complication, called alcohol withdrawal, after surgery.  Alcohol withdrawal is a set of symptoms that people have when they suddenly stop drinking after using alcohol  for a long time. During withdrawal, a person's central nervous  system overreacts. This can cause mild symptoms such as shakiness, sweating or hallucinating. It can also cause other more serious side effects. If not treated properly, alcohol withdrawal can cause potentially life-threatening complications after surgery. This can include tremors, seizures, hallucinations, delirium tremors, and even death. Untreated alcohol withdrawal often leads to a longer stay in the hospital, potentially in the Intensive Care Unit.  Chronic heavy drinking also can interfere with several organ systems and biochemical processes in the body.  This interference can cause serious, even life-threatening complications.  Your care team can offer alcohol withdrawal treatment to help:  Decrease the risk of seizures and delirium tremors after surgery  Decrease the risk we will need to restrain you for your own safety or the safety of others  Decrease your risk of falling after surgery  Reduce the use of potent sedative medications  Reduce the time you stay in the hospital after surgery  Reduce the time you might spend on a mechanical ventilator to help you breathe  Lower incidence of organ failure and biochemical complications  Talk to a member of your care team or your primary care physician about your alcohol use if you feel you may be at risk of any of these complications.        Smoking and Surgery  Quitting smoking is extremely important for a successful surgery and recovery. Cigarette smoking compromises your immune system. This increases your risk of an infection after surgery. Quitting the habit before surgery will decrease the surgical risks associated with smoking.         [facility]  PREOPERATIVE BOWEL PREP INSTRUCTIONS      PREP DAY FOR SURGERY- 8/29/23  SURGERY DAY- 8/30/23      PLEASE HAVE YOUR PRESCRIPTIONS FILLED AND TAKE AS DIRECTED THE DAY PRIOR TO SURGERY    CLEAR LIQUIDS ONLY THE DAY BEFORE SURGERY    Only clear liquids are allowed. Use of anything other than clear  liquids will lessen the quality and safety of the colonoscopy. Clear liquids are any liquid that you can see through including black coffee, tea, soda, sports drinks (Gatorade, Powerade, etc.), Sammy-Aid, Popsicles, Jell-O, clear broths, and clear juices. NO RED ITEMS OF ANY KIND, NO ALCOHOL, NO MILK OR NON-DAIRY CREAMERS, NO JUICE WITH PULP, NO LIQUIDS YOU CAN NOT SEE THROUGH. Diabetics should use sugar free drinks during the prep and monitor your blood sugar closely to prevent low blood sugar.    LIST OF ITEMS YOU WILL NEED TO PURCHASE:  One - 238 gram container of MiraLax laxative  Four - Dulcolax laxative tablets, NOT stool softeners and NOT suppositories.  Two - 32 once/quart bottles of Gatorade or Powerade (any color except red)   (Diabetics may use Powerade Zero)      On Prep Day: (the day before your surgery):  9:00 AM -  Take 4 Dulcolax tablets with 8 ounces of water.   5:00 PM -  Mix 7 capfuls of MiraLax in 1 quart of Gatorade or Powerade. Stir until completely dissolved. Drink an 8 ounce glass of the solution every 15 minutes until you finish the mixture.  8:00 PM -  Mix the rest of the MiraLax in 1 quart of Gatorade or Powerade. Stir until completely dissolved. Drink an 8 ounce glass of the solution every 15 minutes until you finish the mixture.    9:00 PM -  Take Flagyl 500mg and Cipro 500mg by mouth.  11:00 PM - Take Flagyl 500mg and Cipro 500mg by mouth.    DO NOT EAT OR DRINK AFTER MIDNIGHT OR MORNING OF SURGERY.      Take your regular medication the day before you prep except for any medications you were instructed not to take.    Iron tablets and blood thinners such as Coumadin, Warfarin, Jantoven, or Pradaxa are to be stopped for 3 day before the procedure. Stop Plavix for a total of 5 days prior to the procedure.    If you are taking Effient, Xarelto, Eliquis, or Brilinta, consult your cardiologist before discontinuing this medication and before having this procedure.    Blood pressure, cardiac  (heart) and seizure medications, if normally taken in the morning, should be taken with a small sip of water in the morning of the procedure.    Failure to adhere to preparation instructions may result in cancellation and rescheduling of your procedure.      Helpful tips:  Some people may develop nausea with vomiting during the prep. The best remedy for this is to take a break from drinking the solution for about 30 minutes and resume drinking at a slower pace. It is important to drink the entire contents of the solution.  Walking between drinking each glass can help with bloating.  Use baby wipes or medicated wipes instead of toilet paper.  Apply some Vaseline or Desitin to the anal area between bowel movements.        Please contact your physician at [phone] for any questions or concerns.

## 2023-08-17 ENCOUNTER — HOSPITAL ENCOUNTER (EMERGENCY)
Facility: HOSPITAL | Age: 63
Discharge: LEFT BEFORE DISCHARGE | End: 2023-08-18
Payer: COMMERCIAL

## 2023-08-17 VITALS
OXYGEN SATURATION: 99 % | HEART RATE: 64 BPM | HEIGHT: 65 IN | TEMPERATURE: 98 F | BODY MASS INDEX: 14.16 KG/M2 | DIASTOLIC BLOOD PRESSURE: 48 MMHG | SYSTOLIC BLOOD PRESSURE: 101 MMHG | WEIGHT: 85 LBS | RESPIRATION RATE: 14 BRPM

## 2023-08-17 DIAGNOSIS — R10.9 ABDOMINAL PAIN: ICD-10-CM

## 2023-08-17 LAB
BILIRUB UR QL STRIP: NEGATIVE
CLARITY UR: CLEAR
COLOR UR: COLORLESS
GLUCOSE UR STRIP-MCNC: NORMAL MG/DL
KETONES UR STRIP-SCNC: NEGATIVE MG/DL
LEUKOCYTE ESTERASE UR QL STRIP: NEGATIVE
NITRITE UR QL STRIP: NEGATIVE
PH UR STRIP: 5.5 PH UNITS
PROT UR QL STRIP: NEGATIVE
RBC # UR STRIP: NEGATIVE /UL
SP GR UR STRIP: 1
UROBILINOGEN UR STRIP-ACNC: NORMAL MG/DL

## 2023-08-17 PROCEDURE — 99283 PR EMERGENCY DEPT VISIT,LEVEL III: ICD-10-PCS | Mod: ,,, | Performed by: NURSE PRACTITIONER

## 2023-08-17 PROCEDURE — 99283 EMERGENCY DEPT VISIT LOW MDM: CPT

## 2023-08-17 PROCEDURE — 81003 URINALYSIS AUTO W/O SCOPE: CPT | Performed by: NURSE PRACTITIONER

## 2023-08-17 PROCEDURE — 99283 EMERGENCY DEPT VISIT LOW MDM: CPT | Mod: ,,, | Performed by: NURSE PRACTITIONER

## 2023-08-18 ENCOUNTER — TELEPHONE (OUTPATIENT)
Dept: EMERGENCY MEDICINE | Facility: HOSPITAL | Age: 63
End: 2023-08-18
Payer: COMMERCIAL

## 2023-08-18 NOTE — ED PROVIDER NOTES
Encounter Date: 8/17/2023       History     Chief Complaint   Patient presents with    Abdominal Pain     Chronic abd pain. Reports dx with colorectal cancer and schedule for surgery. States unable to work due to pain.     62 year old female presents to ED with complaint of abdominal pain. Patient states she was diagnosed with colon cancer on last month and is scheduled to have surgery on 8/30. Patient is not receiving chemo at this time. She reports increased abdominal pain and inability to have bowel movement. She states she had a small bowel movement on this morning that was small and with noted blood. Reports weakness and inability to eat. Denies fever, chills, chest pain, shortness of breath.     The history is provided by the patient and medical records. No  was used.     Review of patient's allergies indicates:   Allergen Reactions    Asa [aspirin] Anaphylaxis    Penicillins Anaphylaxis     Past Medical History:   Diagnosis Date    IBS (irritable bowel syndrome)      No past surgical history on file.  Family History   Problem Relation Age of Onset    Cancer Mother     Cancer Father     Cancer Maternal Grandmother      Social History     Tobacco Use    Smoking status: Every Day     Current packs/day: 1.00     Average packs/day: 1 pack/day for 50.1 years (50.1 ttl pk-yrs)     Types: Cigarettes     Start date: 7/31/1973    Smokeless tobacco: Never   Substance Use Topics    Alcohol use: Yes     Alcohol/week: 2.0 standard drinks of alcohol     Types: 2 Cans of beer per week     Comment: daily    Drug use: Never     Review of Systems   Constitutional:  Positive for unexpected weight change. Negative for chills and fever.   HENT:  Negative for sinus pressure and sinus pain.    Eyes:  Negative for photophobia and visual disturbance.   Respiratory:  Negative for cough and shortness of breath.    Cardiovascular:  Negative for chest pain and palpitations.   Gastrointestinal:  Positive for abdominal  distention, blood in stool and constipation. Negative for nausea and vomiting.   Endocrine: Negative for cold intolerance and heat intolerance.   Genitourinary:  Negative for dysuria and vaginal pain.   Musculoskeletal:  Negative for arthralgias and gait problem.   Allergic/Immunologic: Negative for environmental allergies and food allergies.   Neurological:  Negative for dizziness and weakness.   Hematological:  Negative for adenopathy. Does not bruise/bleed easily.   Psychiatric/Behavioral:  Negative for agitation and confusion.    All other systems reviewed and are negative.      Physical Exam     Initial Vitals [08/17/23 2220]   BP Pulse Resp Temp SpO2   (!) 101/48 64 14 97.9 °F (36.6 °C) 99 %      MAP       --         Physical Exam    Nursing note and vitals reviewed.  Constitutional: She is not diaphoretic. No distress.   HENT:   Head: Normocephalic and atraumatic.   Eyes: EOM are normal. Pupils are equal, round, and reactive to light.   Neck: Neck supple.   Normal range of motion.  Cardiovascular:  Normal rate and regular rhythm.           No murmur heard.  Pulmonary/Chest: She has no wheezes. She has no rhonchi.   Abdominal: She exhibits no distension. Bowel sounds are decreased. There is abdominal tenderness.   Musculoskeletal:         General: No tenderness or edema.      Cervical back: Normal range of motion and neck supple.     Lymphadenopathy:     She has no cervical adenopathy.   Neurological: She is alert and oriented to person, place, and time. No cranial nerve deficit or sensory deficit.   Skin: Skin is warm and dry. Capillary refill takes less than 2 seconds.   Psychiatric: She has a normal mood and affect. Thought content normal.         Medical Screening Exam   See Full Note    ED Course   Procedures  Labs Reviewed   URINALYSIS, REFLEX TO URINE CULTURE          Imaging Results              X-Ray Abdomen Flat And Erect (Final result)  Result time 08/18/23 07:42:58      Final result by Duke Harden  MD SOLANGE (08/18/23 07:42:58)                   Impression:      Mild to moderate colonic stool.      Electronically signed by: Duke Harden  Date:    08/18/2023  Time:    07:42               Narrative:    EXAMINATION:  XR ABDOMEN FLAT AND ERECT    CLINICAL HISTORY:  Unspecified abdominal pain    TECHNIQUE:  Flat and erect AP views of the abdomen were performed.    COMPARISON:  08/07/2023    FINDINGS:  Mild to moderate colonic stool.  There is no bowel obstruction.  No pneumoperitoneum.  There are a few punctate calcific densities in the left upper quadrant that correlate with the splenic calcifications seen on CT from 06/05/2023.                                       Medications - No data to display        APC / Resident Notes:   Patient eloped                    Clinical Impression:   Final diagnoses:  [R10.9] Abdominal pain        ED Disposition Condition    Eloped                 Tanesha Avalos, [facility]  08/26/23 3753

## 2023-08-28 ENCOUNTER — CLINICAL SUPPORT (OUTPATIENT)
Dept: CARDIOLOGY | Facility: CLINIC | Age: 63
End: 2023-08-28
Attending: SURGERY
Payer: COMMERCIAL

## 2023-08-28 DIAGNOSIS — C20 RECTAL CANCER: ICD-10-CM

## 2023-08-28 PROCEDURE — 93010 EKG 12-LEAD: ICD-10-PCS | Mod: S$PBB,,, | Performed by: INTERNAL MEDICINE

## 2023-08-28 PROCEDURE — 99212 OFFICE O/P EST SF 10 MIN: CPT | Mod: PBBFAC

## 2023-08-28 PROCEDURE — 93005 ELECTROCARDIOGRAM TRACING: CPT | Mod: PBBFAC | Performed by: INTERNAL MEDICINE

## 2023-08-28 PROCEDURE — 93010 ELECTROCARDIOGRAM REPORT: CPT | Mod: S$PBB,,, | Performed by: INTERNAL MEDICINE

## 2023-08-30 ENCOUNTER — ANESTHESIA EVENT (OUTPATIENT)
Dept: SURGERY | Facility: HOSPITAL | Age: 63
DRG: 331 | End: 2023-08-30
Payer: COMMERCIAL

## 2023-08-30 ENCOUNTER — HOSPITAL ENCOUNTER (INPATIENT)
Facility: HOSPITAL | Age: 63
LOS: 6 days | Discharge: HOME | DRG: 331 | End: 2023-09-05
Attending: SURGERY | Admitting: SURGERY
Payer: COMMERCIAL

## 2023-08-30 ENCOUNTER — ANESTHESIA (OUTPATIENT)
Dept: SURGERY | Facility: HOSPITAL | Age: 63
DRG: 331 | End: 2023-08-30
Payer: COMMERCIAL

## 2023-08-30 DIAGNOSIS — K92.1 BLOOD IN STOOL: ICD-10-CM

## 2023-08-30 DIAGNOSIS — R63.4 WEIGHT LOSS: ICD-10-CM

## 2023-08-30 DIAGNOSIS — C20 RECTAL CANCER: Primary | ICD-10-CM

## 2023-08-30 PROCEDURE — 27000165 HC TUBE, ETT CUFFED: Performed by: NURSE ANESTHETIST, CERTIFIED REGISTERED

## 2023-08-30 PROCEDURE — 25000242 PHARM REV CODE 250 ALT 637 W/ HCPCS: Performed by: ANESTHESIOLOGY

## 2023-08-30 PROCEDURE — 63600175 PHARM REV CODE 636 W HCPCS: Performed by: NURSE ANESTHETIST, CERTIFIED REGISTERED

## 2023-08-30 PROCEDURE — 27000510 HC BLANKET BAIR HUGGER ANY SIZE: Performed by: NURSE ANESTHETIST, CERTIFIED REGISTERED

## 2023-08-30 PROCEDURE — 36000709 HC OR TIME LEV III EA ADD 15 MIN: Performed by: SURGERY

## 2023-08-30 PROCEDURE — 27000716 HC OXISENSOR PROBE, ANY SIZE: Performed by: NURSE ANESTHETIST, CERTIFIED REGISTERED

## 2023-08-30 PROCEDURE — 27000655: Performed by: NURSE ANESTHETIST, CERTIFIED REGISTERED

## 2023-08-30 PROCEDURE — D9220A PRA ANESTHESIA: ICD-10-PCS | Mod: CRNA,,, | Performed by: NURSE ANESTHETIST, CERTIFIED REGISTERED

## 2023-08-30 PROCEDURE — 27000284 HC CANNULA NASAL: Performed by: NURSE ANESTHETIST, CERTIFIED REGISTERED

## 2023-08-30 PROCEDURE — 36000708 HC OR TIME LEV III 1ST 15 MIN: Performed by: SURGERY

## 2023-08-30 PROCEDURE — 88305 TISSUE EXAM BY PATHOLOGIST: CPT | Mod: TC,SUR | Performed by: SURGERY

## 2023-08-30 PROCEDURE — 71000033 HC RECOVERY, INTIAL HOUR: Performed by: SURGERY

## 2023-08-30 PROCEDURE — 71000016 HC POSTOP RECOV ADDL HR: Performed by: SURGERY

## 2023-08-30 PROCEDURE — 63600175 PHARM REV CODE 636 W HCPCS: Performed by: SURGERY

## 2023-08-30 PROCEDURE — 88313 SPECIAL STAINS GROUP 2: CPT | Mod: 26,,, | Performed by: PATHOLOGY

## 2023-08-30 PROCEDURE — 25000003 PHARM REV CODE 250: Performed by: NURSE ANESTHETIST, CERTIFIED REGISTERED

## 2023-08-30 PROCEDURE — D9220A PRA ANESTHESIA: ICD-10-PCS | Mod: ANES,,, | Performed by: ANESTHESIOLOGY

## 2023-08-30 PROCEDURE — D9220A PRA ANESTHESIA: Mod: ANES,,, | Performed by: ANESTHESIOLOGY

## 2023-08-30 PROCEDURE — 27201423 OPTIME MED/SURG SUP & DEVICES STERILE SUPPLY: Performed by: SURGERY

## 2023-08-30 PROCEDURE — 63600175 PHARM REV CODE 636 W HCPCS: Performed by: ANESTHESIOLOGY

## 2023-08-30 PROCEDURE — 25000003 PHARM REV CODE 250: Performed by: NURSE PRACTITIONER

## 2023-08-30 PROCEDURE — 44145 PR PART REMOVAL COLON W COLOPROCTOSTOMY: ICD-10-PCS | Mod: ,,, | Performed by: SURGERY

## 2023-08-30 PROCEDURE — 88313 SURGICAL PATHOLOGY: ICD-10-PCS | Mod: 26,,, | Performed by: PATHOLOGY

## 2023-08-30 PROCEDURE — D9220A PRA ANESTHESIA: Mod: CRNA,,, | Performed by: NURSE ANESTHETIST, CERTIFIED REGISTERED

## 2023-08-30 PROCEDURE — S4991 NICOTINE PATCH NONLEGEND: HCPCS | Performed by: NURSE PRACTITIONER

## 2023-08-30 PROCEDURE — 27100025 HC TUBING, SET FLUID WARMER: Performed by: NURSE ANESTHETIST, CERTIFIED REGISTERED

## 2023-08-30 PROCEDURE — 37000009 HC ANESTHESIA EA ADD 15 MINS: Performed by: SURGERY

## 2023-08-30 PROCEDURE — 37000008 HC ANESTHESIA 1ST 15 MINUTES: Performed by: SURGERY

## 2023-08-30 PROCEDURE — 25000003 PHARM REV CODE 250: Performed by: SURGERY

## 2023-08-30 PROCEDURE — 44145 PARTIAL REMOVAL OF COLON: CPT | Mod: ,,, | Performed by: SURGERY

## 2023-08-30 PROCEDURE — 71000015 HC POSTOP RECOV 1ST HR: Performed by: SURGERY

## 2023-08-30 PROCEDURE — 88309 TISSUE EXAM BY PATHOLOGIST: CPT | Mod: 26,,, | Performed by: PATHOLOGY

## 2023-08-30 PROCEDURE — 11000001 HC ACUTE MED/SURG PRIVATE ROOM

## 2023-08-30 PROCEDURE — 63600175 PHARM REV CODE 636 W HCPCS: Performed by: NURSE PRACTITIONER

## 2023-08-30 PROCEDURE — 88305 SURGICAL PATHOLOGY: ICD-10-PCS | Mod: 26,,, | Performed by: PATHOLOGY

## 2023-08-30 PROCEDURE — 88309 SURGICAL PATHOLOGY: ICD-10-PCS | Mod: 26,,, | Performed by: PATHOLOGY

## 2023-08-30 PROCEDURE — 88305 TISSUE EXAM BY PATHOLOGIST: CPT | Mod: 26,,, | Performed by: PATHOLOGY

## 2023-08-30 PROCEDURE — 27000689 HC BLADE LARYNGOSCOPE ANY SIZE: Performed by: NURSE ANESTHETIST, CERTIFIED REGISTERED

## 2023-08-30 RX ORDER — ONDANSETRON 2 MG/ML
INJECTION INTRAMUSCULAR; INTRAVENOUS
Status: DISCONTINUED | OUTPATIENT
Start: 2023-08-30 | End: 2023-08-30

## 2023-08-30 RX ORDER — MORPHINE SULFATE 10 MG/ML
4 INJECTION INTRAMUSCULAR; INTRAVENOUS; SUBCUTANEOUS EVERY 4 HOURS PRN
Status: DISCONTINUED | OUTPATIENT
Start: 2023-08-30 | End: 2023-08-30

## 2023-08-30 RX ORDER — ACETAMINOPHEN 325 MG/1
650 TABLET ORAL EVERY 6 HOURS PRN
Status: DISCONTINUED | OUTPATIENT
Start: 2023-08-30 | End: 2023-09-05 | Stop reason: HOSPADM

## 2023-08-30 RX ORDER — ONDANSETRON 2 MG/ML
4 INJECTION INTRAMUSCULAR; INTRAVENOUS DAILY PRN
Status: DISCONTINUED | OUTPATIENT
Start: 2023-08-30 | End: 2023-08-30 | Stop reason: HOSPADM

## 2023-08-30 RX ORDER — ONDANSETRON 2 MG/ML
4 INJECTION INTRAMUSCULAR; INTRAVENOUS EVERY 8 HOURS PRN
Status: DISCONTINUED | OUTPATIENT
Start: 2023-08-30 | End: 2023-09-05 | Stop reason: HOSPADM

## 2023-08-30 RX ORDER — LIDOCAINE HYDROCHLORIDE 20 MG/ML
INJECTION, SOLUTION EPIDURAL; INFILTRATION; INTRACAUDAL; PERINEURAL
Status: DISCONTINUED | OUTPATIENT
Start: 2023-08-30 | End: 2023-08-30

## 2023-08-30 RX ORDER — SODIUM CHLORIDE 9 MG/ML
INJECTION, SOLUTION INTRAVENOUS CONTINUOUS
Status: DISCONTINUED | OUTPATIENT
Start: 2023-08-30 | End: 2023-09-05 | Stop reason: HOSPADM

## 2023-08-30 RX ORDER — PHENYLEPHRINE HYDROCHLORIDE 10 MG/ML
INJECTION INTRAVENOUS
Status: DISCONTINUED | OUTPATIENT
Start: 2023-08-30 | End: 2023-08-30

## 2023-08-30 RX ORDER — DIPHENHYDRAMINE HYDROCHLORIDE 50 MG/ML
25 INJECTION INTRAMUSCULAR; INTRAVENOUS EVERY 6 HOURS PRN
Status: DISCONTINUED | OUTPATIENT
Start: 2023-08-30 | End: 2023-08-30 | Stop reason: HOSPADM

## 2023-08-30 RX ORDER — CEFAZOLIN SODIUM 1 G/3ML
INJECTION, POWDER, FOR SOLUTION INTRAMUSCULAR; INTRAVENOUS
Status: DISCONTINUED | OUTPATIENT
Start: 2023-08-30 | End: 2023-08-30

## 2023-08-30 RX ORDER — HYDROCODONE BITARTRATE AND ACETAMINOPHEN 5; 325 MG/1; MG/1
1 TABLET ORAL EVERY 4 HOURS PRN
Status: DISCONTINUED | OUTPATIENT
Start: 2023-08-30 | End: 2023-09-05 | Stop reason: HOSPADM

## 2023-08-30 RX ORDER — MORPHINE SULFATE 4 MG/ML
4 INJECTION, SOLUTION INTRAMUSCULAR; INTRAVENOUS EVERY 4 HOURS PRN
Status: DISCONTINUED | OUTPATIENT
Start: 2023-08-30 | End: 2023-09-05 | Stop reason: HOSPADM

## 2023-08-30 RX ORDER — DEXAMETHASONE SODIUM PHOSPHATE 4 MG/ML
INJECTION, SOLUTION INTRA-ARTICULAR; INTRALESIONAL; INTRAMUSCULAR; INTRAVENOUS; SOFT TISSUE
Status: DISCONTINUED | OUTPATIENT
Start: 2023-08-30 | End: 2023-08-30

## 2023-08-30 RX ORDER — PROPOFOL 10 MG/ML
VIAL (ML) INTRAVENOUS
Status: DISCONTINUED | OUTPATIENT
Start: 2023-08-30 | End: 2023-08-30

## 2023-08-30 RX ORDER — MEPERIDINE HYDROCHLORIDE 25 MG/ML
25 INJECTION INTRAMUSCULAR; INTRAVENOUS; SUBCUTANEOUS EVERY 10 MIN PRN
Status: DISCONTINUED | OUTPATIENT
Start: 2023-08-30 | End: 2023-08-30 | Stop reason: HOSPADM

## 2023-08-30 RX ORDER — LIDOCAINE HYDROCHLORIDE 20 MG/ML
JELLY TOPICAL
Status: DISCONTINUED | OUTPATIENT
Start: 2023-08-30 | End: 2023-08-30 | Stop reason: HOSPADM

## 2023-08-30 RX ORDER — IBUPROFEN 200 MG
1 TABLET ORAL DAILY
Status: DISCONTINUED | OUTPATIENT
Start: 2023-08-30 | End: 2023-09-05 | Stop reason: HOSPADM

## 2023-08-30 RX ORDER — BUPIVACAINE HYDROCHLORIDE 5 MG/ML
INJECTION, SOLUTION EPIDURAL; INTRACAUDAL
Status: DISCONTINUED | OUTPATIENT
Start: 2023-08-30 | End: 2023-08-30 | Stop reason: HOSPADM

## 2023-08-30 RX ORDER — MIDAZOLAM HYDROCHLORIDE 1 MG/ML
INJECTION INTRAMUSCULAR; INTRAVENOUS
Status: DISCONTINUED | OUTPATIENT
Start: 2023-08-30 | End: 2023-08-30

## 2023-08-30 RX ORDER — FENTANYL CITRATE 50 UG/ML
INJECTION, SOLUTION INTRAMUSCULAR; INTRAVENOUS
Status: DISCONTINUED | OUTPATIENT
Start: 2023-08-30 | End: 2023-08-30

## 2023-08-30 RX ORDER — MORPHINE SULFATE 10 MG/ML
4 INJECTION INTRAMUSCULAR; INTRAVENOUS; SUBCUTANEOUS EVERY 5 MIN PRN
Status: DISCONTINUED | OUTPATIENT
Start: 2023-08-30 | End: 2023-08-30 | Stop reason: HOSPADM

## 2023-08-30 RX ORDER — HYDROMORPHONE HYDROCHLORIDE 2 MG/ML
0.5 INJECTION, SOLUTION INTRAMUSCULAR; INTRAVENOUS; SUBCUTANEOUS EVERY 5 MIN PRN
Status: DISCONTINUED | OUTPATIENT
Start: 2023-08-30 | End: 2023-08-30 | Stop reason: HOSPADM

## 2023-08-30 RX ORDER — ENOXAPARIN SODIUM 100 MG/ML
40 INJECTION SUBCUTANEOUS EVERY 24 HOURS
Status: DISCONTINUED | OUTPATIENT
Start: 2023-08-30 | End: 2023-09-05 | Stop reason: HOSPADM

## 2023-08-30 RX ORDER — SODIUM CHLORIDE 9 MG/ML
INJECTION, SOLUTION INTRAVENOUS CONTINUOUS
Status: DISCONTINUED | OUTPATIENT
Start: 2023-08-30 | End: 2023-08-30

## 2023-08-30 RX ORDER — TALC
6 POWDER (GRAM) TOPICAL NIGHTLY PRN
Status: DISCONTINUED | OUTPATIENT
Start: 2023-08-30 | End: 2023-09-05 | Stop reason: HOSPADM

## 2023-08-30 RX ORDER — GLYCOPYRROLATE 0.2 MG/ML
INJECTION INTRAMUSCULAR; INTRAVENOUS
Status: DISCONTINUED | OUTPATIENT
Start: 2023-08-30 | End: 2023-08-30

## 2023-08-30 RX ORDER — IPRATROPIUM BROMIDE AND ALBUTEROL SULFATE 2.5; .5 MG/3ML; MG/3ML
3 SOLUTION RESPIRATORY (INHALATION) ONCE
Status: COMPLETED | OUTPATIENT
Start: 2023-08-30 | End: 2023-08-30

## 2023-08-30 RX ORDER — SODIUM CHLORIDE 0.9 % (FLUSH) 0.9 %
10 SYRINGE (ML) INJECTION
Status: DISCONTINUED | OUTPATIENT
Start: 2023-08-30 | End: 2023-09-05 | Stop reason: HOSPADM

## 2023-08-30 RX ORDER — ROCURONIUM BROMIDE 10 MG/ML
INJECTION, SOLUTION INTRAVENOUS
Status: DISCONTINUED | OUTPATIENT
Start: 2023-08-30 | End: 2023-08-30

## 2023-08-30 RX ORDER — HYDROMORPHONE HYDROCHLORIDE 2 MG/ML
INJECTION, SOLUTION INTRAMUSCULAR; INTRAVENOUS; SUBCUTANEOUS
Status: DISCONTINUED | OUTPATIENT
Start: 2023-08-30 | End: 2023-08-30

## 2023-08-30 RX ADMIN — NICOTINE 1 PATCH: 14 PATCH, EXTENDED RELEASE TRANSDERMAL at 05:08

## 2023-08-30 RX ADMIN — ENOXAPARIN SODIUM 40 MG: 100 INJECTION SUBCUTANEOUS at 05:08

## 2023-08-30 RX ADMIN — SODIUM CHLORIDE: 9 INJECTION, SOLUTION INTRAVENOUS at 07:08

## 2023-08-30 RX ADMIN — MORPHINE SULFATE 4 MG: 10 INJECTION, SOLUTION INTRAMUSCULAR; INTRAVENOUS at 11:08

## 2023-08-30 RX ADMIN — FENTANYL CITRATE 25 MCG: 50 INJECTION INTRAMUSCULAR; INTRAVENOUS at 07:08

## 2023-08-30 RX ADMIN — HYDROMORPHONE HYDROCHLORIDE 0.5 MG: 2 INJECTION, SOLUTION INTRAMUSCULAR; INTRAVENOUS; SUBCUTANEOUS at 08:08

## 2023-08-30 RX ADMIN — ROCURONIUM BROMIDE 10 MG: 10 INJECTION, SOLUTION INTRAVENOUS at 09:08

## 2023-08-30 RX ADMIN — ONDANSETRON 4 MG: 2 INJECTION INTRAMUSCULAR; INTRAVENOUS at 07:08

## 2023-08-30 RX ADMIN — HYDROMORPHONE HYDROCHLORIDE 0.5 MG: 2 INJECTION, SOLUTION INTRAMUSCULAR; INTRAVENOUS; SUBCUTANEOUS at 11:08

## 2023-08-30 RX ADMIN — ROCURONIUM BROMIDE 10 MG: 10 INJECTION, SOLUTION INTRAVENOUS at 10:08

## 2023-08-30 RX ADMIN — LIDOCAINE HYDROCHLORIDE 40 MG: 20 INJECTION, SOLUTION INTRAVENOUS at 07:08

## 2023-08-30 RX ADMIN — PROPOFOL 80 MG: 10 INJECTION, EMULSION INTRAVENOUS at 07:08

## 2023-08-30 RX ADMIN — ROCURONIUM BROMIDE 20 MG: 10 INJECTION, SOLUTION INTRAVENOUS at 08:08

## 2023-08-30 RX ADMIN — GLYCOPYRROLATE 0.2 MG: 0.2 INJECTION INTRAMUSCULAR; INTRAVENOUS at 07:08

## 2023-08-30 RX ADMIN — PHENYLEPHRINE HYDROCHLORIDE 100 MCG: 10 INJECTION INTRAVENOUS at 09:08

## 2023-08-30 RX ADMIN — MIDAZOLAM HYDROCHLORIDE 2 MG: 1 INJECTION, SOLUTION INTRAMUSCULAR; INTRAVENOUS at 07:08

## 2023-08-30 RX ADMIN — IPRATROPIUM BROMIDE AND ALBUTEROL SULFATE 3 ML: .5; 3 SOLUTION RESPIRATORY (INHALATION) at 11:08

## 2023-08-30 RX ADMIN — SODIUM CHLORIDE: 9 INJECTION, SOLUTION INTRAVENOUS at 05:08

## 2023-08-30 RX ADMIN — PHENYLEPHRINE HYDROCHLORIDE 150 MCG: 10 INJECTION INTRAVENOUS at 08:08

## 2023-08-30 RX ADMIN — SUGAMMADEX 200 MG: 100 INJECTION, SOLUTION INTRAVENOUS at 10:08

## 2023-08-30 RX ADMIN — ONDANSETRON 4 MG: 2 INJECTION INTRAMUSCULAR; INTRAVENOUS at 01:08

## 2023-08-30 RX ADMIN — PHENYLEPHRINE HYDROCHLORIDE 150 MCG: 10 INJECTION INTRAVENOUS at 09:08

## 2023-08-30 RX ADMIN — FENTANYL CITRATE 25 MCG: 50 INJECTION INTRAMUSCULAR; INTRAVENOUS at 08:08

## 2023-08-30 RX ADMIN — HYDROMORPHONE HYDROCHLORIDE 0.5 MG: 2 INJECTION, SOLUTION INTRAMUSCULAR; INTRAVENOUS; SUBCUTANEOUS at 09:08

## 2023-08-30 RX ADMIN — DEXAMETHASONE SODIUM PHOSPHATE 8 MG: 4 INJECTION, SOLUTION INTRA-ARTICULAR; INTRALESIONAL; INTRAMUSCULAR; INTRAVENOUS; SOFT TISSUE at 07:08

## 2023-08-30 RX ADMIN — SODIUM CHLORIDE: 9 INJECTION, SOLUTION INTRAVENOUS at 09:08

## 2023-08-30 RX ADMIN — FENTANYL CITRATE 50 MCG: 50 INJECTION INTRAMUSCULAR; INTRAVENOUS at 08:08

## 2023-08-30 RX ADMIN — MORPHINE SULFATE 4 MG: 4 INJECTION, SOLUTION INTRAMUSCULAR; INTRAVENOUS at 05:08

## 2023-08-30 RX ADMIN — MORPHINE SULFATE 4 MG: 10 INJECTION, SOLUTION INTRAMUSCULAR; INTRAVENOUS at 01:08

## 2023-08-30 RX ADMIN — ROCURONIUM BROMIDE 30 MG: 10 INJECTION, SOLUTION INTRAVENOUS at 07:08

## 2023-08-30 RX ADMIN — CEFAZOLIN 2 G: 1 INJECTION, POWDER, FOR SOLUTION INTRAMUSCULAR; INTRAVENOUS; PARENTERAL at 08:08

## 2023-08-30 NOTE — TRANSFER OF CARE
"Anesthesia Transfer of Care Note    Patient: Bipin Bassett    Procedure(s) Performed: Procedure(s) (LRB):  EXAM UNDER ANESTHESIA, DIGITAL, RECTUM (N/A)  PROCTOSCOPY (N/A)  RESECTION, RECTUM, LOW ANTERIOR (N/A)    Patient location: PACU    Anesthesia Type: general    Transport from OR: Transported from OR on 2-3 L/min O2 by NC with adequate spontaneous ventilation    Post pain: pain needs to be addressed    Post assessment: no apparent anesthetic complications and tolerated procedure well    Post vital signs: stable    Level of consciousness: alert, responds to stimulation and awake    Nausea/Vomiting: no nausea/vomiting    Complications: none    Transfer of care protocol was followed      Last vitals:   Visit Vitals  BP (!) 145/77   Pulse 97   Temp 36.7 °C (98.1 °F) (Oral)   Resp 14   Ht 5' 5" (1.651 m)   Wt 39.5 kg (87 lb)   LMP  (LMP Unknown)   SpO2 100%   Breastfeeding No   BMI 14.48 kg/m²     "

## 2023-08-30 NOTE — PROGRESS NOTES
1059 RECEIVED TO RR EASILY AROUSED. HOB ELEVATED. RESP. DEEP UNLABORED. O2 VIA NC. ABDOMEN FLAT, SOT WITH MID-LINE DRESSING D/I. ANG DRAIN TO ABDOMINAL INCISION. KNUTSON CATH P/D YELLOW URINE. SCD HOSE IN PROGRESS. IV INFUSING WELL RIGHT FOREARM 20G. CATH. FACIAL GRIMACE NOTED, C/O STOMACH HURTING. DILAUDID GIVEN PER CRNA ON ARRIVAL TO RR. SEE FLOW SHEET.      1138 SLEEPING WITH INTERMITTENT AROUSAL WITH C/O STOMACH HURTING. MORPHINE TITRATED FOR RELIEF. ALBUTEROL TX IN PROGRESS PER RESP. THERAPIST. AWAITING ROOM PLACEMENT. FAMILY MADE AWARE. OBSERVING CLOSELY.      1155 SLEEPING WITHOUT DISTRESS NOTED. ABDOMEN SOFT DRESSING D/I, ANG DRAIN INTACT. NO DISTRESS NOTED. TRANSFERRED TO ROOM.

## 2023-08-30 NOTE — ANESTHESIA PREPROCEDURE EVALUATION
08/30/2023  Bipin Bassett is a 62 y.o., female.      Pre-op Assessment    I have reviewed the Patient Summary Reports.     I have reviewed the Nursing Notes. I have reviewed the NPO Status.   I have reviewed the Medications.     Review of Systems  Anesthesia Hx:  No problems with previous Anesthesia    Social:  No Alcohol Use, Smoker    Hematology/Oncology:     Oncology Normal    -- Anemia:   EENT/Dental:EENT/Dental Normal   Cardiovascular:  Cardiovascular Normal  ECG has been reviewed.    Pulmonary:   COPD    Renal/:  Renal/ Normal     Hepatic/GI:   Hiatal Hernia, Rectal mass   Musculoskeletal:  Musculoskeletal Normal    Neurological:  Neurology Normal    Endocrine:  Endocrine Normal    Dermatological:  Skin Normal    Psych:  Psychiatric Normal           Physical Exam  General: Well nourished    Airway:  Mallampati: II / II  Mouth Opening: Normal  TM Distance: > 6 cm  Tongue: Normal  Neck ROM: Normal ROM    Dental:  Periodontal disease, Loose teeth    Chest/Lungs:  Clear to auscultation, Normal Respiratory Rate    Heart:  Rate: Normal  Rhythm: Regular Rhythm  Sounds: Normal        Chemistry        Component Value Date/Time     08/28/2023 1356    K 3.5 08/28/2023 1356     08/28/2023 1356    CO2 29 08/28/2023 1356    BUN 6 (L) 08/28/2023 1356    CREATININE 0.66 08/28/2023 1356    GLU 74 08/28/2023 1356        Component Value Date/Time    CALCIUM 9.1 08/28/2023 1356    ALKPHOS 78 08/28/2023 1356    AST 27 08/28/2023 1356    ALT 20 08/28/2023 1356    BILITOT 0.6 08/28/2023 1356        Lab Results   Component Value Date    WBC 5.67 08/28/2023    HGB 13.7 08/28/2023    HCT 39.5 08/28/2023     08/28/2023     Results for orders placed or performed during the hospital encounter of 07/15/23   EKG 12-lead    Collection Time: 07/16/23 12:12 AM    Narrative    Test Reason : R10.9,    Vent.  Rate : 053 BPM     Atrial Rate : 000 BPM     P-R Int : 150 ms          QRS Dur : 084 ms      QT Int : 478 ms       P-R-T Axes : 068 076 091 degrees     QTc Int : 466 ms    Sinus rhythm  Possible sequence error: V2,V3 omitted  Inferior ST elevation, CONSIDER ACUTE INFARCT      Confirmed by Angelito NIEVES, Talya LEIVA ([id number]) on 7/16/2023 5:42:36 PM    Referred By: [email or web address]   SELF           Confirmed By:Talya Eaton MD         Anesthesia Plan  Type of Anesthesia, risks & benefits discussed:    Anesthesia Type: Gen Natural Airway  Intra-op Monitoring Plan: Standard ASA Monitors  Post Op Pain Control Plan: multimodal analgesia  Induction:  IV  Informed Consent: Informed consent signed with the Patient and all parties understand the risks and agree with anesthesia plan.  All questions answered.   ASA Score: 3  Day of Surgery Review of History & Physical: H&P Update referred to the surgeon/provider.I have interviewed and examined the patient. I have reviewed the patient's H&P dated: There are no significant changes.     Ready For Surgery From Anesthesia Perspective.     .

## 2023-08-30 NOTE — ANESTHESIA PROCEDURE NOTES
Intubation    Date/Time: 8/30/2023 7:43 AM    Performed by: Christophe Melgoza Jr., CRNA  Authorized by: Pedro Bland MD    Intubation:     Induction:  Intravenous    Intubated:  Postinduction    Mask Ventilation:  Easy mask    Attempts:  1    Attempted By:  CRNA    Method of Intubation:  Direct    Blade:  Miles 2    Laryngeal View Grade: Grade I - full view of cords      Difficult Airway Encountered?: No      Complications:  None    Airway Device:  Oral endotracheal tube    Airway Device Size:  7.0    Style/Cuff Inflation:  Cuffed (inflated to minimal occlusive pressure)    Tube secured:  20    Secured at:  The lips    Placement Verified By:  Capnometry    Complicating Factors:  None    Findings Post-Intubation:  Atraumatic/condition of teeth unchanged

## 2023-08-30 NOTE — OP NOTE
[facility]     Operative Note    SUMMARY     Date of Procedure: 8/30/2023     Procedure: Procedure(s) (LRB):  EXAM UNDER ANESTHESIA, DIGITAL, RECTUM (N/A)  PROCTOSCOPY (N/A)  RESECTION, RECTUM, LOW ANTERIOR (N/A)     Surgeon(s) and Role:     * Joce العلي MD - Primary    Assisting Surgeon: None    Pre-Operative Diagnosis: Rectal cancer [C20]  Weight loss [R63.4]  Blood in stool [K92.1]    Post-Operative Diagnosis: Post-Op Diagnosis Codes:     * Rectal cancer [C20]     * Weight loss [R63.4]     * Blood in stool [K92.1]    Anesthesia: General    Technical Procedures Used:  The patient was brought to the operating room and placed in supine position.  General anesthesia was administered per anesthesia staff.  The abdomen was prepped and draped standard sterile fashion.  A midline incision was performed and taken down to the fascia which was sharply transected.  Peritoneum was carefully entered.  Sigmoid colon was then mobilized incising the peritoneal reflection on the left.  This was carried toward the left side of the rectum.  Posterior dissection was then performed bluntly and sharply, freeing up the posterior aspect of the rectum.  The right lateral side of the peritoneum was then incised.  Position of both ureters was noted.  These were kept in view at all times.  Subsequent to this anterior dissection was performed.  Once we were satisfied that we were distal to the tumor the lateral stalks were transected.  We identified the ureters prior to performing this.  The contour stapler was used to transect the colon.  LigaSure was then used to resect the mesentery.  The proximal transection was performed using cautery.  Specimen was then opened on the back table identifying a 4-5 cm margin.  Following this the sizers were used to size the sigmoid colon.  A 29 mm stapler was then obtained.  The anvil was placed in the sigmoid colon and a pursestring Prolene suture was used to secure  this in the colon.  Stapler was then introduced across the anus following dilation.  Stapler was mated to the anvil and fired.  When trying to remove the stapler, it was felt that it was not completely free from the colon tissue.  Ultimately, we could identify some tissue that had to be cut with scissors.  Underwater leak test was performed, and there was leakage of air at the right lateral side.  Interrupted silk sutures x3 were placed.  The underwater air leak test was again performed , this time without bubbles.  The abdomen was then irrigated, and anastomosis again evaluated for air leaks.  Subsequent to this fascial closure was achieved using interrupted PDS.  Skin was reapproximated with clips.  A james VAC drain was placed.  The patient was awakened from anesthesia in stable condition.      Description of the Findings of the Procedure:  The mass was seen on proctoscopy to be 8 or 9 cm from anal verge.  The mass was able to be palpated during the course of the dissection, as well.  After resection it was noted that there was a 4-5 cm margin.  Both ureters were seen and avoided.  The stapler would not retract from the staple line, as there was an incomplete cut.  Ultimately the tissue had to be cut away from 1 of the rings as the anastomosis was everted through the anus.  A leak test revealed a leak on the right lateral side.  This was oversewn with interrupted 3-0 silk suture.  After this, the underwater leak test was good.    Assistant(s): JOANIE Acuna, PG2    Complications: No    Estimated Blood Loss (EBL): 50           Implants: * No implants in log *    Specimens:  Rectum and distal sigmoid with tumor.    Rings of colon tissue from the anastomosis margins, suture in proximal margin  Specimen (24h ago, onward)       Start     Ordered    08/30/23 1036  Surgical Pathology  RELEASE UPON ORDERING         08/30/23 1036                     Condition: Good      Complications:  None

## 2023-08-31 LAB
ALBUMIN SERPL BCP-MCNC: 2.5 G/DL (ref 3.5–5)
ALBUMIN/GLOB SERPL: 0.7 {RATIO}
ALP SERPL-CCNC: 55 U/L (ref 50–130)
ALT SERPL W P-5'-P-CCNC: 12 U/L (ref 13–56)
ANION GAP SERPL CALCULATED.3IONS-SCNC: 11 MMOL/L (ref 7–16)
AST SERPL W P-5'-P-CCNC: 19 U/L (ref 15–37)
BASOPHILS # BLD AUTO: 0.04 K/UL (ref 0–0.2)
BASOPHILS NFR BLD AUTO: 0.2 % (ref 0–1)
BILIRUB SERPL-MCNC: 0.5 MG/DL (ref ?–1.2)
BUN SERPL-MCNC: 5 MG/DL (ref 7–18)
BUN/CREAT SERPL: 9 (ref 6–20)
CALCIUM SERPL-MCNC: 8.2 MG/DL (ref 8.5–10.1)
CHLORIDE SERPL-SCNC: 114 MMOL/L (ref 98–107)
CO2 SERPL-SCNC: 21 MMOL/L (ref 21–32)
CREAT SERPL-MCNC: 0.57 MG/DL (ref 0.55–1.02)
DIFFERENTIAL METHOD BLD: ABNORMAL
EGFR (NO RACE VARIABLE) (RUSH/TITUS): 102 ML/MIN/1.73M2
EOSINOPHIL # BLD AUTO: 0.07 K/UL (ref 0–0.5)
EOSINOPHIL NFR BLD AUTO: 0.3 % (ref 1–4)
ERYTHROCYTE [DISTWIDTH] IN BLOOD BY AUTOMATED COUNT: 13.4 % (ref 11.5–14.5)
GLOBULIN SER-MCNC: 3.4 G/DL (ref 2–4)
GLUCOSE SERPL-MCNC: 93 MG/DL (ref 74–106)
HCT VFR BLD AUTO: 37 % (ref 38–47)
HGB BLD-MCNC: 12.6 G/DL (ref 12–16)
IMM GRANULOCYTES # BLD AUTO: 0.17 K/UL (ref 0–0.04)
IMM GRANULOCYTES NFR BLD: 0.8 % (ref 0–0.4)
LYMPHOCYTES # BLD AUTO: 1.67 K/UL (ref 1–4.8)
LYMPHOCYTES NFR BLD AUTO: 8.3 % (ref 27–41)
MCH RBC QN AUTO: 34.4 PG (ref 27–31)
MCHC RBC AUTO-ENTMCNC: 34.1 G/DL (ref 32–36)
MCV RBC AUTO: 101.1 FL (ref 80–96)
MONOCYTES # BLD AUTO: 1.33 K/UL (ref 0–0.8)
MONOCYTES NFR BLD AUTO: 6.6 % (ref 2–6)
MPC BLD CALC-MCNC: 10.9 FL (ref 9.4–12.4)
NEUTROPHILS # BLD AUTO: 16.77 K/UL (ref 1.8–7.7)
NEUTROPHILS NFR BLD AUTO: 83.8 % (ref 53–65)
NRBC # BLD AUTO: 0 X10E3/UL
NRBC, AUTO (.00): 0 %
PLATELET # BLD AUTO: 180 K/UL (ref 150–400)
POTASSIUM SERPL-SCNC: 3.7 MMOL/L (ref 3.5–5.1)
PROT SERPL-MCNC: 5.9 G/DL (ref 6.4–8.2)
RBC # BLD AUTO: 3.66 M/UL (ref 4.2–5.4)
SODIUM SERPL-SCNC: 142 MMOL/L (ref 136–145)
WBC # BLD AUTO: 20.05 K/UL (ref 4.5–11)

## 2023-08-31 PROCEDURE — 25000003 PHARM REV CODE 250: Performed by: SURGERY

## 2023-08-31 PROCEDURE — 25000003 PHARM REV CODE 250: Performed by: NURSE PRACTITIONER

## 2023-08-31 PROCEDURE — 85025 COMPLETE CBC W/AUTO DIFF WBC: CPT | Performed by: NURSE PRACTITIONER

## 2023-08-31 PROCEDURE — S4991 NICOTINE PATCH NONLEGEND: HCPCS | Performed by: NURSE PRACTITIONER

## 2023-08-31 PROCEDURE — 80053 COMPREHEN METABOLIC PANEL: CPT | Performed by: NURSE PRACTITIONER

## 2023-08-31 PROCEDURE — 63600175 PHARM REV CODE 636 W HCPCS: Performed by: SURGERY

## 2023-08-31 PROCEDURE — 63600175 PHARM REV CODE 636 W HCPCS: Performed by: NURSE PRACTITIONER

## 2023-08-31 PROCEDURE — 11000001 HC ACUTE MED/SURG PRIVATE ROOM

## 2023-08-31 RX ORDER — MUPIROCIN 20 MG/G
OINTMENT TOPICAL 2 TIMES DAILY
Status: COMPLETED | OUTPATIENT
Start: 2023-08-31 | End: 2023-09-04

## 2023-08-31 RX ADMIN — MORPHINE SULFATE 4 MG: 4 INJECTION, SOLUTION INTRAMUSCULAR; INTRAVENOUS at 08:08

## 2023-08-31 RX ADMIN — MUPIROCIN: 20 OINTMENT TOPICAL at 08:08

## 2023-08-31 RX ADMIN — SODIUM CHLORIDE: 9 INJECTION, SOLUTION INTRAVENOUS at 03:08

## 2023-08-31 RX ADMIN — NICOTINE 1 PATCH: 14 PATCH, EXTENDED RELEASE TRANSDERMAL at 08:08

## 2023-08-31 RX ADMIN — ENOXAPARIN SODIUM 40 MG: 100 INJECTION SUBCUTANEOUS at 04:08

## 2023-08-31 RX ADMIN — MUPIROCIN: 20 OINTMENT TOPICAL at 10:08

## 2023-08-31 RX ADMIN — SODIUM CHLORIDE: 9 INJECTION, SOLUTION INTRAVENOUS at 02:08

## 2023-08-31 RX ADMIN — MORPHINE SULFATE 4 MG: 4 INJECTION, SOLUTION INTRAMUSCULAR; INTRAVENOUS at 03:08

## 2023-08-31 NOTE — PLAN OF CARE
[facility]  Initial Discharge Assessment       Primary Care Provider: No, Primary Doctor    Admission Diagnosis: Rectal cancer [C20]  Weight loss [R63.4]  Blood in stool [K92.1]    Admission Date: 8/30/2023  Expected Discharge Date:     Transition of Care Barriers: None    Payor: [employer] / Plan: [employer] [facility] [employer] [place] / Product Type: Commercial /     Extended Emergency Contact Information  Primary Emergency Contact: [email or web address]  Home Phone: [phone]  Mobile Phone: [phone]  Relation: Sister  Preferred language: English   needed? No    Discharge Plan A: Home  Discharge Plan B: Home with family      [id number], [place] - [address] AT [facility] [address]  [address]  [facility] [address]  Phone: [phone] Fax: [phone]    [facility], [place] - [address]  [address] [facility] [address]  Phone: [phone] Fax: [phone]      Initial Assessment (most recent)       Adult Discharge Assessment - 08/31/23 0929          Discharge Assessment    Assessment Type Discharge Planning Assessment     Confirmed/corrected address, phone number and insurance Yes     Source of Information patient     Communicated BUD with patient/caregiver Date not available/Unable to determine     People in Home sibling(s);other relative(s)     Do you expect to return to your current living situation? Yes     Do you have help at home or someone to help you manage your care at home? Yes     Who are your caregiver(s) and their phone number(s)? sister and niece     Prior to hospitilization cognitive status: Unable to Assess     Current cognitive status: Alert/Oriented     Equipment Currently Used at Home none     Patient currently being followed by outpatient case management? No     Do you currently have service(s) that help you manage your care at home? No     Do you take prescription medications? Yes      Do you have prescription coverage? Yes     Coverage ambetter     Do you have any problems affording any of your prescribed medications? No     Is the patient taking medications as prescribed? yes     Who is going to help you get home at discharge? sister and niece     How do you get to doctors appointments? family or friend will provide     Are you on dialysis? No     Do you take coumadin? No     DME Needed Upon Discharge  none     Discharge Plan discussed with: Patient     Transition of Care Barriers None     Discharge Plan A Home     Discharge Plan B Home with family        Physical Activity    On average, how many days per week do you engage in moderate to strenuous exercise (like a brisk walk)? 0 days     On average, how many minutes do you engage in exercise at this level? 0 min        Financial Resource Strain    How hard is it for you to pay for the very basics like food, housing, medical care, and heating? Not hard at all        Housing Stability    In the last 12 months, was there a time when you were not able to pay the mortgage or rent on time? No     In the last 12 months, how many places have you lived? 1     In the last 12 months, was there a time when you did not have a steady place to sleep or slept in a shelter (including now)? No        Transportation Needs    In the past 12 months, has lack of transportation kept you from medical appointments or from getting medications? No     In the past 12 months, has lack of transportation kept you from meetings, work, or from getting things needed for daily living? No        Food Insecurity    Within the past 12 months, you worried that your food would run out before you got the money to buy more. Never true     Within the past 12 months, the food you bought just didn't last and you didn't have money to get more. Never true        Stress    Do you feel stress - tense, restless, nervous, or anxious, or unable to sleep at night because your mind is troubled all  the time - these days? Not at all        Social Connections    In a typical week, how many times do you talk on the phone with family, friends, or neighbors? Three times a week     How often do you get together with friends or relatives? More than three times a week     How often do you attend Religious or Pentecostal services? Never     Do you belong to any clubs or organizations such as Religious groups, unions, fraternal or athletic groups, or school groups? No     How often do you attend meetings of the clubs or organizations you belong to? Never        Alcohol Use    Q1: How often do you have a drink containing alcohol? 4 or more times a week     Q2: How many drinks containing alcohol do you have on a typical day when you are drinking? 3 or 4     Q3: How often do you have six or more drinks on one occasion? Weekly                   Ss spoke with pt and pt lives at home with sister and niece and plans to return at d/c. No d/c needs stated at this time. Ss following.

## 2023-08-31 NOTE — SUBJECTIVE & OBJECTIVE
Interval History:   Postop day 1.  No nausea or vomiting.  Diffuse abdominal pain, controlled.  No flatus or bowel movement.  Plan to leave Mckay in place another 24 hours due to manipulation within the pelvis.  Need to encourage mobility.  Continue NPO, but can have a few ice chips.    Medications:  Continuous Infusions:   sodium chloride 0.9% 100 mL/hr at 08/31/23 0328     Scheduled Meds:   enoxparin  40 mg Subcutaneous Q24H (prophylaxis, 1700)    mupirocin   Nasal BID    nicotine  1 patch Transdermal Daily     PRN Meds:acetaminophen, HYDROcodone-acetaminophen, melatonin, morphine, ondansetron, sodium chloride 0.9%     Review of patient's allergies indicates:   Allergen Reactions    Asa [aspirin] Anaphylaxis    Penicillins Anaphylaxis     Objective:     Vital Signs (Most Recent):  Temp: 99.2 °F (37.3 °C) (08/31/23 0755)  Pulse: 71 (08/31/23 0755)  Resp: 17 (08/31/23 0824)  BP: (!) 125/59 (08/31/23 0755)  SpO2: 100 % (08/31/23 0755) Vital Signs (24h Range):  Temp:  [97 °F (36.1 °C)-99.2 °F (37.3 °C)] 99.2 °F (37.3 °C)  Pulse:  [] 71  Resp:  [12-18] 17  SpO2:  [86 %-100 %] 100 %  BP: ()/(52-81) 125/59     Weight: 39.5 kg (87 lb)  Body mass index is 14.48 kg/m².    Intake/Output - Last 3 Shifts         08/29 0700 08/30 0659 08/30 0700 08/31 0659 08/31 0700 09/01 0659    I.V. (mL/kg)  1600 (40.5)     Total Intake(mL/kg)  1600 (40.5)     Urine (mL/kg/hr)  1130 (1.2)     Total Output  1130     Net  +470                     Physical Exam  Vitals reviewed.   Constitutional:       General: She is not in acute distress.  Cardiovascular:      Rate and Rhythm: Normal rate.   Pulmonary:      Effort: Pulmonary effort is normal. No respiratory distress.   Abdominal:      General: There is no distension.      Palpations: Abdomen is soft.      Tenderness: There is abdominal tenderness (Diffuse, appropriate).      Comments: Midline james dressing clean, dry, intact   Skin:     General: Skin is warm and dry.    Neurological:      Mental Status: She is alert. Mental status is at baseline.          Significant Labs:  I have reviewed all pertinent lab results within the past 24 hours.  CBC:   Recent Labs   Lab 08/31/23  0402   WBC 20.05*   RBC 3.66*   HGB 12.6   HCT 37.0*      .1*   MCH 34.4*   MCHC 34.1     CMP:   Recent Labs   Lab 08/31/23  0402   GLU 93   CALCIUM 8.2*   ALBUMIN 2.5*   PROT 5.9*      K 3.7   CO2 21   *   BUN 5*   CREATININE 0.57   ALKPHOS 55   ALT 12*   AST 19   BILITOT 0.5       Significant Diagnostics:  I have reviewed all pertinent imaging results/findings within the past 24 hours.

## 2023-08-31 NOTE — PROGRESS NOTES
[facility]  General Surgery  Progress Note    Subjective:     History of Present Illness:  No notes on file    Post-Op Info:  Procedure(s) (LRB):  EXAM UNDER ANESTHESIA, DIGITAL, RECTUM (N/A)  PROCTOSCOPY (N/A)  RESECTION, RECTUM, LOW ANTERIOR (N/A)   1 Day Post-Op     Interval History:   Postop day 1.  No nausea or vomiting.  Diffuse abdominal pain, controlled.  No flatus or bowel movement.  Plan to leave Mckay in place another 24 hours due to manipulation within the pelvis.  Need to encourage mobility.  Continue NPO, but can have a few ice chips.    Medications:  Continuous Infusions:   sodium chloride 0.9% 100 mL/hr at 08/31/23 0328     Scheduled Meds:   enoxparin  40 mg Subcutaneous Q24H (prophylaxis, 1700)    mupirocin   Nasal BID    nicotine  1 patch Transdermal Daily     PRN Meds:acetaminophen, HYDROcodone-acetaminophen, melatonin, morphine, ondansetron, sodium chloride 0.9%     Review of patient's allergies indicates:   Allergen Reactions    Asa [aspirin] Anaphylaxis    Penicillins Anaphylaxis     Objective:     Vital Signs (Most Recent):  Temp: 99.2 °F (37.3 °C) (08/31/23 0755)  Pulse: 71 (08/31/23 0755)  Resp: 17 (08/31/23 0824)  BP: (!) 125/59 (08/31/23 0755)  SpO2: 100 % (08/31/23 0755) Vital Signs (24h Range):  Temp:  [97 °F (36.1 °C)-99.2 °F (37.3 °C)] 99.2 °F (37.3 °C)  Pulse:  [] 71  Resp:  [12-18] 17  SpO2:  [86 %-100 %] 100 %  BP: ()/(52-81) 125/59     Weight: 39.5 kg (87 lb)  Body mass index is 14.48 kg/m².    Intake/Output - Last 3 Shifts         08/29 0700 08/30 0659 08/30 0700 08/31 0659 08/31 0700 09/01 0659    I.V. (mL/kg)  1600 (40.5)     Total Intake(mL/kg)  1600 (40.5)     Urine (mL/kg/hr)  1130 (1.2)     Total Output  1130     Net  +470                     Physical Exam  Vitals reviewed.   Constitutional:       General: She is not in acute distress.  Cardiovascular:      Rate and Rhythm: Normal rate.   Pulmonary:      Effort: Pulmonary effort is  normal. No respiratory distress.   Abdominal:      General: There is no distension.      Palpations: Abdomen is soft.      Tenderness: There is abdominal tenderness (Diffuse, appropriate).      Comments: Midline james dressing clean, dry, intact   Skin:     General: Skin is warm and dry.   Neurological:      Mental Status: She is alert. Mental status is at baseline.          Significant Labs:  I have reviewed all pertinent lab results within the past 24 hours.  CBC:   Recent Labs   Lab 08/31/23  0402   WBC 20.05*   RBC 3.66*   HGB 12.6   HCT 37.0*      .1*   MCH 34.4*   MCHC 34.1     CMP:   Recent Labs   Lab 08/31/23  0402   GLU 93   CALCIUM 8.2*   ALBUMIN 2.5*   PROT 5.9*      K 3.7   CO2 21   *   BUN 5*   CREATININE 0.57   ALKPHOS 55   ALT 12*   AST 19   BILITOT 0.5       Significant Diagnostics:  I have reviewed all pertinent imaging results/findings within the past 24 hours.    Assessment/Plan:     No notes have been filed under this hospital service.  Service: General Surgery      Romulo Syed, PETRA  General Surgery  [facility]

## 2023-08-31 NOTE — ANESTHESIA POSTPROCEDURE EVALUATION
Anesthesia Post Evaluation    Patient: Bipin Bassett    Procedure(s) Performed: Procedure(s) (LRB):  EXAM UNDER ANESTHESIA, DIGITAL, RECTUM (N/A)  PROCTOSCOPY (N/A)  RESECTION, RECTUM, LOW ANTERIOR (N/A)    Final Anesthesia Type: general      Patient location during evaluation: PACU  Patient participation: Yes- Able to Participate  Level of consciousness: awake and alert  Post-procedure vital signs: reviewed and stable  Pain management: adequate  Airway patency: patent  OMID mitigation strategies: Multimodal analgesia  PONV status at discharge: No PONV  Anesthetic complications: no      Cardiovascular status: blood pressure returned to baseline  Respiratory status: unassisted  Hydration status: euvolemic  Follow-up not needed.          Vitals Value Taken Time   /81 08/30/23 1530   Temp 36.7 °C (98.1 °F) 08/30/23 1102   Pulse 88 08/30/23 1530   Resp 16 08/30/23 1338   SpO2 86 % 08/30/23 1530         Event Time   Out of Recovery 11:55:00         Pain/Talha Score: Pain Rating Prior to Med Admin: 8 (8/31/2023  8:24 AM)  Pain Rating Post Med Admin: 4 (8/31/2023  8:53 AM)  Talha Score: 8 (8/30/2023 12:00 PM)

## 2023-09-01 LAB
ANION GAP SERPL CALCULATED.3IONS-SCNC: 11 MMOL/L (ref 7–16)
BASOPHILS # BLD AUTO: 0.05 K/UL (ref 0–0.2)
BASOPHILS NFR BLD AUTO: 0.4 % (ref 0–1)
BUN SERPL-MCNC: 5 MG/DL (ref 7–18)
BUN/CREAT SERPL: 11 (ref 6–20)
CALCIUM SERPL-MCNC: 8.4 MG/DL (ref 8.5–10.1)
CHLORIDE SERPL-SCNC: 108 MMOL/L (ref 98–107)
CO2 SERPL-SCNC: 23 MMOL/L (ref 21–32)
CREAT SERPL-MCNC: 0.46 MG/DL (ref 0.55–1.02)
DHEA SERPL-MCNC: NORMAL
DIFFERENTIAL METHOD BLD: ABNORMAL
EGFR (NO RACE VARIABLE) (RUSH/TITUS): 107 ML/MIN/1.73M2
EOSINOPHIL # BLD AUTO: 0.03 K/UL (ref 0–0.5)
EOSINOPHIL NFR BLD AUTO: 0.2 % (ref 1–4)
ERYTHROCYTE [DISTWIDTH] IN BLOOD BY AUTOMATED COUNT: 13.2 % (ref 11.5–14.5)
ESTROGEN SERPL-MCNC: NORMAL PG/ML
GLUCOSE SERPL-MCNC: 59 MG/DL (ref 74–106)
HCT VFR BLD AUTO: 34.8 % (ref 38–47)
HGB BLD-MCNC: 11.5 G/DL (ref 12–16)
IMM GRANULOCYTES # BLD AUTO: 0.06 K/UL (ref 0–0.04)
IMM GRANULOCYTES NFR BLD: 0.5 % (ref 0–0.4)
INSULIN SERPL-ACNC: NORMAL U[IU]/ML
LAB AP GROSS DESCRIPTION: NORMAL
LAB AP LABORATORY NOTES: NORMAL
LAB AP SYNOPTIC CHECKLIST: NORMAL
LYMPHOCYTES # BLD AUTO: 1.41 K/UL (ref 1–4.8)
LYMPHOCYTES NFR BLD AUTO: 10.9 % (ref 27–41)
MCH RBC QN AUTO: 34 PG (ref 27–31)
MCHC RBC AUTO-ENTMCNC: 33 G/DL (ref 32–36)
MCV RBC AUTO: 103 FL (ref 80–96)
MONOCYTES # BLD AUTO: 0.89 K/UL (ref 0–0.8)
MONOCYTES NFR BLD AUTO: 6.9 % (ref 2–6)
MPC BLD CALC-MCNC: 11.1 FL (ref 9.4–12.4)
NEUTROPHILS # BLD AUTO: 10.49 K/UL (ref 1.8–7.7)
NEUTROPHILS NFR BLD AUTO: 81.1 % (ref 53–65)
NRBC # BLD AUTO: 0 X10E3/UL
NRBC, AUTO (.00): 0 %
PLATELET # BLD AUTO: 164 K/UL (ref 150–400)
POTASSIUM SERPL-SCNC: 3.7 MMOL/L (ref 3.5–5.1)
RBC # BLD AUTO: 3.38 M/UL (ref 4.2–5.4)
SODIUM SERPL-SCNC: 138 MMOL/L (ref 136–145)
T3RU NFR SERPL: NORMAL %
WBC # BLD AUTO: 12.93 K/UL (ref 4.5–11)

## 2023-09-01 PROCEDURE — 63600175 PHARM REV CODE 636 W HCPCS: Performed by: NURSE PRACTITIONER

## 2023-09-01 PROCEDURE — 25000003 PHARM REV CODE 250: Performed by: NURSE PRACTITIONER

## 2023-09-01 PROCEDURE — S4991 NICOTINE PATCH NONLEGEND: HCPCS | Performed by: NURSE PRACTITIONER

## 2023-09-01 PROCEDURE — 99900035 HC TECH TIME PER 15 MIN (STAT)

## 2023-09-01 PROCEDURE — 63600175 PHARM REV CODE 636 W HCPCS: Performed by: SURGERY

## 2023-09-01 PROCEDURE — 11000001 HC ACUTE MED/SURG PRIVATE ROOM

## 2023-09-01 PROCEDURE — 85025 COMPLETE CBC W/AUTO DIFF WBC: CPT | Performed by: NURSE PRACTITIONER

## 2023-09-01 PROCEDURE — 80048 BASIC METABOLIC PNL TOTAL CA: CPT | Performed by: NURSE PRACTITIONER

## 2023-09-01 PROCEDURE — 94761 N-INVAS EAR/PLS OXIMETRY MLT: CPT

## 2023-09-01 RX ADMIN — ENOXAPARIN SODIUM 40 MG: 100 INJECTION SUBCUTANEOUS at 04:09

## 2023-09-01 RX ADMIN — SODIUM CHLORIDE: 9 INJECTION, SOLUTION INTRAVENOUS at 04:09

## 2023-09-01 RX ADMIN — SODIUM CHLORIDE: 9 INJECTION, SOLUTION INTRAVENOUS at 01:09

## 2023-09-01 RX ADMIN — MUPIROCIN: 20 OINTMENT TOPICAL at 09:09

## 2023-09-01 RX ADMIN — NICOTINE 1 PATCH: 14 PATCH, EXTENDED RELEASE TRANSDERMAL at 09:09

## 2023-09-01 RX ADMIN — MORPHINE SULFATE 4 MG: 4 INJECTION, SOLUTION INTRAMUSCULAR; INTRAVENOUS at 06:09

## 2023-09-01 RX ADMIN — SODIUM CHLORIDE: 9 INJECTION, SOLUTION INTRAVENOUS at 09:09

## 2023-09-01 RX ADMIN — MORPHINE SULFATE 4 MG: 4 INJECTION, SOLUTION INTRAMUSCULAR; INTRAVENOUS at 04:09

## 2023-09-01 RX ADMIN — MUPIROCIN: 20 OINTMENT TOPICAL at 08:09

## 2023-09-01 NOTE — PROGRESS NOTES
[facility]  General Surgery  Progress Note    Subjective:     History of Present Illness:  No notes on file    Post-Op Info:  Procedure(s) (LRB):  EXAM UNDER ANESTHESIA, DIGITAL, RECTUM (N/A)  PROCTOSCOPY (N/A)  RESECTION, RECTUM, LOW ANTERIOR (N/A)   2 Days Post-Op     Interval History:   Postop day 2.  No nausea or vomiting.  Diffuse abdominal pain, improved, controlled.  Small amount of flatus, but no bowel movement yet. Need to encourage mobility.  Will advance to clear liquid diet, go slowly.    Medications:  Continuous Infusions:   sodium chloride 0.9% 100 mL/hr at 09/01/23 0924     Scheduled Meds:   enoxparin  40 mg Subcutaneous Q24H (prophylaxis, 1700)    mupirocin   Nasal BID    nicotine  1 patch Transdermal Daily     PRN Meds:acetaminophen, HYDROcodone-acetaminophen, melatonin, morphine, ondansetron, sodium chloride 0.9%     Review of patient's allergies indicates:   Allergen Reactions    Asa [aspirin] Anaphylaxis    Penicillins Anaphylaxis     Objective:     Vital Signs (Most Recent):  Temp: 98.4 °F (36.9 °C) (09/01/23 1100)  Pulse: 68 (09/01/23 1100)  Resp: 20 (09/01/23 1100)  BP: (!) 160/73 (09/01/23 1100)  SpO2: 100 % (09/01/23 1100) Vital Signs (24h Range):  Temp:  [97.3 °F (36.3 °C)-98.6 °F (37 °C)] 98.4 °F (36.9 °C)  Pulse:  [] 68  Resp:  [18-20] 20  SpO2:  [95 %-100 %] 100 %  BP: (135-160)/(63-74) 160/73     Weight: 39.5 kg (87 lb)  Body mass index is 14.48 kg/m².    Intake/Output - Last 3 Shifts         08/30 0700 08/31 0659 08/31 0700 09/01 0659 09/01 0700 09/02 0659    P.O.  0     I.V. (mL/kg) 1600 (40.5)      Total Intake(mL/kg) 1600 (40.5) 0 (0)     Urine (mL/kg/hr) 1130 (1.2) 5300 (5.6)     Emesis/NG output  0     Other  0     Stool  0     Blood  0     Total Output 1130 5300     Net +470 -5300            Urine Occurrence  2 x     Stool Occurrence  0 x     Emesis Occurrence  0 x             Physical Exam  Vitals reviewed.   Constitutional:       General:  She is not in acute distress.  Cardiovascular:      Rate and Rhythm: Normal rate.   Pulmonary:      Effort: Pulmonary effort is normal. No respiratory distress.   Abdominal:      General: There is no distension.      Palpations: Abdomen is soft.      Tenderness: There is abdominal tenderness (Diffuse, appropriate).      Comments: Midline james dressing clean, dry, intact   Skin:     General: Skin is warm and dry.   Neurological:      Mental Status: She is alert. Mental status is at baseline.          Significant Labs:  I have reviewed all pertinent lab results within the past 24 hours.  CBC:   Recent Labs   Lab 09/01/23  1002   WBC 12.93*   RBC 3.38*   HGB 11.5*   HCT 34.8*      .0*   MCH 34.0*   MCHC 33.0       CMP:   Recent Labs   Lab 08/31/23  0402 09/01/23  1002   GLU 93 59*   CALCIUM 8.2* 8.4*   ALBUMIN 2.5*  --    PROT 5.9*  --     138   K 3.7 3.7   CO2 21 23   * 108*   BUN 5* 5*   CREATININE 0.57 0.46*   ALKPHOS 55  --    ALT 12*  --    AST 19  --    BILITOT 0.5  --          Significant Diagnostics:  I have reviewed all pertinent imaging results/findings within the past 24 hours.    Assessment/Plan:     No notes have been filed under this hospital service.  Service: General Surgery      PETRA Delgado  General Surgery  [facility]

## 2023-09-01 NOTE — PLAN OF CARE
Problem: Adult Inpatient Plan of Care  Goal: Plan of Care Review  Outcome: Ongoing, Progressing  Goal: Patient-Specific Goal (Individualized)  Outcome: Ongoing, Progressing  Goal: Absence of Hospital-Acquired Illness or Injury  Outcome: Ongoing, Progressing  Goal: Optimal Comfort and Wellbeing  Outcome: Ongoing, Progressing  Goal: Readiness for Transition of Care  Outcome: Ongoing, Progressing     Problem: Infection  Goal: Absence of Infection Signs and Symptoms  Outcome: Ongoing, Progressing     Problem: Fall Injury Risk  Goal: Absence of Fall and Fall-Related Injury  Outcome: Ongoing, Progressing      Total Body Time: .721J Total Body Time: .362J

## 2023-09-01 NOTE — SUBJECTIVE & OBJECTIVE
Interval History:   Postop day 2.  No nausea or vomiting.  Diffuse abdominal pain, improved, controlled.  Small amount of flatus, but no bowel movement yet. Need to encourage mobility.  Will advance to clear liquid diet, go slowly.    Medications:  Continuous Infusions:   sodium chloride 0.9% 100 mL/hr at 09/01/23 0924     Scheduled Meds:   enoxparin  40 mg Subcutaneous Q24H (prophylaxis, 1700)    mupirocin   Nasal BID    nicotine  1 patch Transdermal Daily     PRN Meds:acetaminophen, HYDROcodone-acetaminophen, melatonin, morphine, ondansetron, sodium chloride 0.9%     Review of patient's allergies indicates:   Allergen Reactions    Asa [aspirin] Anaphylaxis    Penicillins Anaphylaxis     Objective:     Vital Signs (Most Recent):  Temp: 98.4 °F (36.9 °C) (09/01/23 1100)  Pulse: 68 (09/01/23 1100)  Resp: 20 (09/01/23 1100)  BP: (!) 160/73 (09/01/23 1100)  SpO2: 100 % (09/01/23 1100) Vital Signs (24h Range):  Temp:  [97.3 °F (36.3 °C)-98.6 °F (37 °C)] 98.4 °F (36.9 °C)  Pulse:  [] 68  Resp:  [18-20] 20  SpO2:  [95 %-100 %] 100 %  BP: (135-160)/(63-74) 160/73     Weight: 39.5 kg (87 lb)  Body mass index is 14.48 kg/m².    Intake/Output - Last 3 Shifts         08/30 0700 08/31 0659 08/31 0700 09/01 0659 09/01 0700 09/02 0659    P.O.  0     I.V. (mL/kg) 1600 (40.5)      Total Intake(mL/kg) 1600 (40.5) 0 (0)     Urine (mL/kg/hr) 1130 (1.2) 5300 (5.6)     Emesis/NG output  0     Other  0     Stool  0     Blood  0     Total Output 1130 5300     Net +470 -5300            Urine Occurrence  2 x     Stool Occurrence  0 x     Emesis Occurrence  0 x              Physical Exam  Vitals reviewed.   Constitutional:       General: She is not in acute distress.  Cardiovascular:      Rate and Rhythm: Normal rate.   Pulmonary:      Effort: Pulmonary effort is normal. No respiratory distress.   Abdominal:      General: There is no distension.      Palpations: Abdomen is soft.      Tenderness: There is abdominal tenderness  (Diffuse, appropriate).      Comments: Midline james dressing clean, dry, intact   Skin:     General: Skin is warm and dry.   Neurological:      Mental Status: She is alert. Mental status is at baseline.          Significant Labs:  I have reviewed all pertinent lab results within the past 24 hours.  CBC:   Recent Labs   Lab 09/01/23  1002   WBC 12.93*   RBC 3.38*   HGB 11.5*   HCT 34.8*      .0*   MCH 34.0*   MCHC 33.0       CMP:   Recent Labs   Lab 08/31/23  0402 09/01/23  1002   GLU 93 59*   CALCIUM 8.2* 8.4*   ALBUMIN 2.5*  --    PROT 5.9*  --     138   K 3.7 3.7   CO2 21 23   * 108*   BUN 5* 5*   CREATININE 0.57 0.46*   ALKPHOS 55  --    ALT 12*  --    AST 19  --    BILITOT 0.5  --          Significant Diagnostics:  I have reviewed all pertinent imaging results/findings within the past 24 hours.

## 2023-09-02 LAB
ANION GAP SERPL CALCULATED.3IONS-SCNC: 11 MMOL/L (ref 7–16)
BASOPHILS # BLD AUTO: 0.03 K/UL (ref 0–0.2)
BASOPHILS NFR BLD AUTO: 0.3 % (ref 0–1)
BUN SERPL-MCNC: 4 MG/DL (ref 7–18)
BUN/CREAT SERPL: 11 (ref 6–20)
CALCIUM SERPL-MCNC: 9.1 MG/DL (ref 8.5–10.1)
CHLORIDE SERPL-SCNC: 100 MMOL/L (ref 98–107)
CO2 SERPL-SCNC: 25 MMOL/L (ref 21–32)
CREAT SERPL-MCNC: 0.35 MG/DL (ref 0.55–1.02)
DIFFERENTIAL METHOD BLD: ABNORMAL
EGFR (NO RACE VARIABLE) (RUSH/TITUS): 114 ML/MIN/1.73M2
EOSINOPHIL # BLD AUTO: 0.08 K/UL (ref 0–0.5)
EOSINOPHIL NFR BLD AUTO: 0.8 % (ref 1–4)
ERYTHROCYTE [DISTWIDTH] IN BLOOD BY AUTOMATED COUNT: 12.1 % (ref 11.5–14.5)
GLUCOSE SERPL-MCNC: 84 MG/DL (ref 74–106)
HCT VFR BLD AUTO: 35.5 % (ref 38–47)
HGB BLD-MCNC: 12.4 G/DL (ref 12–16)
IMM GRANULOCYTES # BLD AUTO: 0.04 K/UL (ref 0–0.04)
IMM GRANULOCYTES NFR BLD: 0.4 % (ref 0–0.4)
LYMPHOCYTES # BLD AUTO: 1.52 K/UL (ref 1–4.8)
LYMPHOCYTES NFR BLD AUTO: 14.6 % (ref 27–41)
MCH RBC QN AUTO: 34 PG (ref 27–31)
MCHC RBC AUTO-ENTMCNC: 34.9 G/DL (ref 32–36)
MCV RBC AUTO: 97.3 FL (ref 80–96)
MONOCYTES # BLD AUTO: 0.92 K/UL (ref 0–0.8)
MONOCYTES NFR BLD AUTO: 8.8 % (ref 2–6)
MPC BLD CALC-MCNC: 11.6 FL (ref 9.4–12.4)
NEUTROPHILS # BLD AUTO: 7.81 K/UL (ref 1.8–7.7)
NEUTROPHILS NFR BLD AUTO: 75.1 % (ref 53–65)
NRBC # BLD AUTO: 0 X10E3/UL
NRBC, AUTO (.00): 0 %
PLATELET # BLD AUTO: 177 K/UL (ref 150–400)
POTASSIUM SERPL-SCNC: 3.2 MMOL/L (ref 3.5–5.1)
RBC # BLD AUTO: 3.65 M/UL (ref 4.2–5.4)
SODIUM SERPL-SCNC: 133 MMOL/L (ref 136–145)
WBC # BLD AUTO: 10.4 K/UL (ref 4.5–11)

## 2023-09-02 PROCEDURE — S4991 NICOTINE PATCH NONLEGEND: HCPCS | Performed by: NURSE PRACTITIONER

## 2023-09-02 PROCEDURE — 25000003 PHARM REV CODE 250: Performed by: SURGERY

## 2023-09-02 PROCEDURE — 85025 COMPLETE CBC W/AUTO DIFF WBC: CPT | Performed by: NURSE PRACTITIONER

## 2023-09-02 PROCEDURE — 11000001 HC ACUTE MED/SURG PRIVATE ROOM

## 2023-09-02 PROCEDURE — 27000221 HC OXYGEN, UP TO 24 HOURS

## 2023-09-02 PROCEDURE — 63600175 PHARM REV CODE 636 W HCPCS: Performed by: NURSE PRACTITIONER

## 2023-09-02 PROCEDURE — 99900035 HC TECH TIME PER 15 MIN (STAT)

## 2023-09-02 PROCEDURE — 80048 BASIC METABOLIC PNL TOTAL CA: CPT | Performed by: NURSE PRACTITIONER

## 2023-09-02 PROCEDURE — 25000003 PHARM REV CODE 250: Performed by: NURSE PRACTITIONER

## 2023-09-02 PROCEDURE — 94761 N-INVAS EAR/PLS OXIMETRY MLT: CPT

## 2023-09-02 PROCEDURE — 63600175 PHARM REV CODE 636 W HCPCS: Performed by: SURGERY

## 2023-09-02 RX ORDER — POTASSIUM CHLORIDE 7.45 MG/ML
20 INJECTION INTRAVENOUS ONCE
Status: COMPLETED | OUTPATIENT
Start: 2023-09-02 | End: 2023-09-02

## 2023-09-02 RX ADMIN — MUPIROCIN: 20 OINTMENT TOPICAL at 10:09

## 2023-09-02 RX ADMIN — MUPIROCIN: 20 OINTMENT TOPICAL at 09:09

## 2023-09-02 RX ADMIN — HYDROCODONE BITARTRATE AND ACETAMINOPHEN 1 TABLET: 5; 325 TABLET ORAL at 11:09

## 2023-09-02 RX ADMIN — SODIUM CHLORIDE: 9 INJECTION, SOLUTION INTRAVENOUS at 12:09

## 2023-09-02 RX ADMIN — NICOTINE 1 PATCH: 14 PATCH, EXTENDED RELEASE TRANSDERMAL at 09:09

## 2023-09-02 RX ADMIN — POTASSIUM CHLORIDE 20 MEQ: 7.46 INJECTION, SOLUTION INTRAVENOUS at 12:09

## 2023-09-02 RX ADMIN — ENOXAPARIN SODIUM 40 MG: 100 INJECTION SUBCUTANEOUS at 04:09

## 2023-09-02 RX ADMIN — HYDROCODONE BITARTRATE AND ACETAMINOPHEN 1 TABLET: 5; 325 TABLET ORAL at 09:09

## 2023-09-02 RX ADMIN — ONDANSETRON 4 MG: 2 INJECTION INTRAMUSCULAR; INTRAVENOUS at 11:09

## 2023-09-02 RX ADMIN — SODIUM CHLORIDE: 9 INJECTION, SOLUTION INTRAVENOUS at 11:09

## 2023-09-02 RX ADMIN — ONDANSETRON 4 MG: 2 INJECTION INTRAMUSCULAR; INTRAVENOUS at 04:09

## 2023-09-02 NOTE — PLAN OF CARE
Problem: Gas Exchange Impaired  Goal: Optimal Gas Exchange  9/2/2023 0843 by Ibeth Blackburn, RRT  Outcome: Ongoing, Progressing  9/2/2023 0843 by Ibeth Blackburn, RRT  Outcome: Ongoing, Progressing

## 2023-09-02 NOTE — PROGRESS NOTES
States she feels better.    Still having flatus.    Patient reports being incontinent of urine.      Vital signs good  Abdomen is soft and appropriately tender.    Labs good call with slightly low potassium    Will advance diet to full liquids followed by solid food if she tolerates.  Await bowel movement prior to discharge.

## 2023-09-03 LAB
ANION GAP SERPL CALCULATED.3IONS-SCNC: 12 MMOL/L (ref 7–16)
BASOPHILS # BLD AUTO: 0.03 K/UL (ref 0–0.2)
BASOPHILS NFR BLD AUTO: 0.4 % (ref 0–1)
BUN SERPL-MCNC: 4 MG/DL (ref 7–18)
BUN/CREAT SERPL: 10 (ref 6–20)
CALCIUM SERPL-MCNC: 8.7 MG/DL (ref 8.5–10.1)
CHLORIDE SERPL-SCNC: 98 MMOL/L (ref 98–107)
CO2 SERPL-SCNC: 31 MMOL/L (ref 21–32)
CREAT SERPL-MCNC: 0.42 MG/DL (ref 0.55–1.02)
DIFFERENTIAL METHOD BLD: ABNORMAL
EGFR (NO RACE VARIABLE) (RUSH/TITUS): 109 ML/MIN/1.73M2
EOSINOPHIL # BLD AUTO: 0.04 K/UL (ref 0–0.5)
EOSINOPHIL NFR BLD AUTO: 0.6 % (ref 1–4)
ERYTHROCYTE [DISTWIDTH] IN BLOOD BY AUTOMATED COUNT: 12.1 % (ref 11.5–14.5)
GLUCOSE SERPL-MCNC: 89 MG/DL (ref 74–106)
HCT VFR BLD AUTO: 37.4 % (ref 38–47)
HGB BLD-MCNC: 13 G/DL (ref 12–16)
IMM GRANULOCYTES # BLD AUTO: 0.03 K/UL (ref 0–0.04)
IMM GRANULOCYTES NFR BLD: 0.4 % (ref 0–0.4)
LYMPHOCYTES # BLD AUTO: 1.35 K/UL (ref 1–4.8)
LYMPHOCYTES NFR BLD AUTO: 19.5 % (ref 27–41)
MCH RBC QN AUTO: 34.4 PG (ref 27–31)
MCHC RBC AUTO-ENTMCNC: 34.8 G/DL (ref 32–36)
MCV RBC AUTO: 98.9 FL (ref 80–96)
MONOCYTES # BLD AUTO: 0.86 K/UL (ref 0–0.8)
MONOCYTES NFR BLD AUTO: 12.4 % (ref 2–6)
MPC BLD CALC-MCNC: 11.6 FL (ref 9.4–12.4)
NEUTROPHILS # BLD AUTO: 4.6 K/UL (ref 1.8–7.7)
NEUTROPHILS NFR BLD AUTO: 66.7 % (ref 53–65)
NRBC # BLD AUTO: 0 X10E3/UL
NRBC, AUTO (.00): 0 %
PLATELET # BLD AUTO: 199 K/UL (ref 150–400)
POTASSIUM SERPL-SCNC: 3.5 MMOL/L (ref 3.5–5.1)
RBC # BLD AUTO: 3.78 M/UL (ref 4.2–5.4)
SODIUM SERPL-SCNC: 137 MMOL/L (ref 136–145)
WBC # BLD AUTO: 6.91 K/UL (ref 4.5–11)

## 2023-09-03 PROCEDURE — S4991 NICOTINE PATCH NONLEGEND: HCPCS | Performed by: NURSE PRACTITIONER

## 2023-09-03 PROCEDURE — 94761 N-INVAS EAR/PLS OXIMETRY MLT: CPT

## 2023-09-03 PROCEDURE — 25000003 PHARM REV CODE 250: Performed by: NURSE PRACTITIONER

## 2023-09-03 PROCEDURE — 27000221 HC OXYGEN, UP TO 24 HOURS

## 2023-09-03 PROCEDURE — 11000001 HC ACUTE MED/SURG PRIVATE ROOM

## 2023-09-03 PROCEDURE — 63600175 PHARM REV CODE 636 W HCPCS: Performed by: NURSE PRACTITIONER

## 2023-09-03 PROCEDURE — 80048 BASIC METABOLIC PNL TOTAL CA: CPT | Performed by: NURSE PRACTITIONER

## 2023-09-03 PROCEDURE — 85025 COMPLETE CBC W/AUTO DIFF WBC: CPT | Performed by: NURSE PRACTITIONER

## 2023-09-03 PROCEDURE — 99900035 HC TECH TIME PER 15 MIN (STAT)

## 2023-09-03 RX ADMIN — HYDROCODONE BITARTRATE AND ACETAMINOPHEN 1 TABLET: 5; 325 TABLET ORAL at 11:09

## 2023-09-03 RX ADMIN — NICOTINE 1 PATCH: 14 PATCH, EXTENDED RELEASE TRANSDERMAL at 11:09

## 2023-09-03 RX ADMIN — SODIUM CHLORIDE: 9 INJECTION, SOLUTION INTRAVENOUS at 01:09

## 2023-09-03 RX ADMIN — MUPIROCIN: 20 OINTMENT TOPICAL at 09:09

## 2023-09-03 RX ADMIN — MUPIROCIN: 20 OINTMENT TOPICAL at 11:09

## 2023-09-03 RX ADMIN — ENOXAPARIN SODIUM 40 MG: 100 INJECTION SUBCUTANEOUS at 05:09

## 2023-09-03 NOTE — PLAN OF CARE
Problem: Adult Inpatient Plan of Care  Goal: Plan of Care Review  Outcome: Ongoing, Progressing  Flowsheets (Taken 9/2/2023 4472)  Plan of Care Reviewed With: patient  Goal: Patient-Specific Goal (Individualized)  Outcome: Ongoing, Progressing  Goal: Absence of Hospital-Acquired Illness or Injury  Outcome: Ongoing, Progressing  Goal: Optimal Comfort and Wellbeing  Outcome: Ongoing, Progressing  Goal: Readiness for Transition of Care  Outcome: Ongoing, Progressing

## 2023-09-04 LAB
ANION GAP SERPL CALCULATED.3IONS-SCNC: 12 MMOL/L (ref 7–16)
BASOPHILS # BLD AUTO: 0.02 K/UL (ref 0–0.2)
BASOPHILS NFR BLD AUTO: 0.3 % (ref 0–1)
BUN SERPL-MCNC: 4 MG/DL (ref 7–18)
BUN/CREAT SERPL: 11 (ref 6–20)
CALCIUM SERPL-MCNC: 9 MG/DL (ref 8.5–10.1)
CHLORIDE SERPL-SCNC: 98 MMOL/L (ref 98–107)
CO2 SERPL-SCNC: 30 MMOL/L (ref 21–32)
CREAT SERPL-MCNC: 0.38 MG/DL (ref 0.55–1.02)
DIFFERENTIAL METHOD BLD: ABNORMAL
EGFR (NO RACE VARIABLE) (RUSH/TITUS): 112 ML/MIN/1.73M2
EOSINOPHIL # BLD AUTO: 0.05 K/UL (ref 0–0.5)
EOSINOPHIL NFR BLD AUTO: 0.7 % (ref 1–4)
ERYTHROCYTE [DISTWIDTH] IN BLOOD BY AUTOMATED COUNT: 11.9 % (ref 11.5–14.5)
GLUCOSE SERPL-MCNC: 99 MG/DL (ref 74–106)
HCT VFR BLD AUTO: 34.3 % (ref 38–47)
HGB BLD-MCNC: 12.1 G/DL (ref 12–16)
IMM GRANULOCYTES # BLD AUTO: 0.03 K/UL (ref 0–0.04)
IMM GRANULOCYTES NFR BLD: 0.4 % (ref 0–0.4)
LYMPHOCYTES # BLD AUTO: 0.76 K/UL (ref 1–4.8)
LYMPHOCYTES NFR BLD AUTO: 10.6 % (ref 27–41)
MCH RBC QN AUTO: 34.2 PG (ref 27–31)
MCHC RBC AUTO-ENTMCNC: 35.3 G/DL (ref 32–36)
MCV RBC AUTO: 96.9 FL (ref 80–96)
MONOCYTES # BLD AUTO: 0.82 K/UL (ref 0–0.8)
MONOCYTES NFR BLD AUTO: 11.5 % (ref 2–6)
MPC BLD CALC-MCNC: 11.3 FL (ref 9.4–12.4)
NEUTROPHILS # BLD AUTO: 5.46 K/UL (ref 1.8–7.7)
NEUTROPHILS NFR BLD AUTO: 76.5 % (ref 53–65)
NRBC # BLD AUTO: 0 X10E3/UL
NRBC, AUTO (.00): 0 %
PLATELET # BLD AUTO: 201 K/UL (ref 150–400)
POTASSIUM SERPL-SCNC: 3.2 MMOL/L (ref 3.5–5.1)
RBC # BLD AUTO: 3.54 M/UL (ref 4.2–5.4)
SODIUM SERPL-SCNC: 137 MMOL/L (ref 136–145)
WBC # BLD AUTO: 7.14 K/UL (ref 4.5–11)

## 2023-09-04 PROCEDURE — 85025 COMPLETE CBC W/AUTO DIFF WBC: CPT | Performed by: NURSE PRACTITIONER

## 2023-09-04 PROCEDURE — 25000003 PHARM REV CODE 250: Performed by: NURSE PRACTITIONER

## 2023-09-04 PROCEDURE — S4991 NICOTINE PATCH NONLEGEND: HCPCS | Performed by: NURSE PRACTITIONER

## 2023-09-04 PROCEDURE — 11000001 HC ACUTE MED/SURG PRIVATE ROOM

## 2023-09-04 PROCEDURE — 80048 BASIC METABOLIC PNL TOTAL CA: CPT | Performed by: NURSE PRACTITIONER

## 2023-09-04 PROCEDURE — 63600175 PHARM REV CODE 636 W HCPCS: Performed by: NURSE PRACTITIONER

## 2023-09-04 RX ADMIN — SODIUM CHLORIDE: 9 INJECTION, SOLUTION INTRAVENOUS at 11:09

## 2023-09-04 RX ADMIN — NICOTINE 1 PATCH: 14 PATCH, EXTENDED RELEASE TRANSDERMAL at 08:09

## 2023-09-04 RX ADMIN — MUPIROCIN: 20 OINTMENT TOPICAL at 08:09

## 2023-09-04 RX ADMIN — MUPIROCIN: 20 OINTMENT TOPICAL at 01:09

## 2023-09-04 RX ADMIN — ONDANSETRON 4 MG: 2 INJECTION INTRAMUSCULAR; INTRAVENOUS at 01:09

## 2023-09-04 RX ADMIN — ENOXAPARIN SODIUM 40 MG: 100 INJECTION SUBCUTANEOUS at 07:09

## 2023-09-04 RX ADMIN — HYDROCODONE BITARTRATE AND ACETAMINOPHEN 1 TABLET: 5; 325 TABLET ORAL at 04:09

## 2023-09-04 RX ADMIN — SODIUM CHLORIDE: 9 INJECTION, SOLUTION INTRAVENOUS at 12:09

## 2023-09-04 NOTE — PROGRESS NOTES
States she reports improvement in nausea.  Still having flatus.        Vital signs good  Abdomen is soft and appropriately tender.        Will continue liquid diet until bowel movement.    Await bowel movement prior to discharge.

## 2023-09-05 VITALS
WEIGHT: 93.06 LBS | HEIGHT: 65 IN | OXYGEN SATURATION: 99 % | TEMPERATURE: 98 F | RESPIRATION RATE: 18 BRPM | SYSTOLIC BLOOD PRESSURE: 135 MMHG | DIASTOLIC BLOOD PRESSURE: 68 MMHG | BODY MASS INDEX: 15.5 KG/M2 | HEART RATE: 66 BPM

## 2023-09-05 LAB
ANION GAP SERPL CALCULATED.3IONS-SCNC: 11 MMOL/L (ref 7–16)
BASOPHILS # BLD AUTO: 0.02 K/UL (ref 0–0.2)
BASOPHILS NFR BLD AUTO: 0.3 % (ref 0–1)
BUN SERPL-MCNC: 3 MG/DL (ref 7–18)
BUN/CREAT SERPL: 8 (ref 6–20)
CALCIUM SERPL-MCNC: 8.3 MG/DL (ref 8.5–10.1)
CHLORIDE SERPL-SCNC: 97 MMOL/L (ref 98–107)
CO2 SERPL-SCNC: 30 MMOL/L (ref 21–32)
CREAT SERPL-MCNC: 0.39 MG/DL (ref 0.55–1.02)
DIFFERENTIAL METHOD BLD: ABNORMAL
EGFR (NO RACE VARIABLE) (RUSH/TITUS): 111 ML/MIN/1.73M2
EOSINOPHIL # BLD AUTO: 0.13 K/UL (ref 0–0.5)
EOSINOPHIL NFR BLD AUTO: 1.7 % (ref 1–4)
ERYTHROCYTE [DISTWIDTH] IN BLOOD BY AUTOMATED COUNT: 11.9 % (ref 11.5–14.5)
GLUCOSE SERPL-MCNC: 109 MG/DL (ref 74–106)
HCT VFR BLD AUTO: 31 % (ref 38–47)
HGB BLD-MCNC: 10.8 G/DL (ref 12–16)
IMM GRANULOCYTES # BLD AUTO: 0.04 K/UL (ref 0–0.04)
IMM GRANULOCYTES NFR BLD: 0.5 % (ref 0–0.4)
LYMPHOCYTES # BLD AUTO: 0.81 K/UL (ref 1–4.8)
LYMPHOCYTES NFR BLD AUTO: 10.7 % (ref 27–41)
MCH RBC QN AUTO: 34.1 PG (ref 27–31)
MCHC RBC AUTO-ENTMCNC: 34.8 G/DL (ref 32–36)
MCV RBC AUTO: 97.8 FL (ref 80–96)
MONOCYTES # BLD AUTO: 1.04 K/UL (ref 0–0.8)
MONOCYTES NFR BLD AUTO: 13.8 % (ref 2–6)
MPC BLD CALC-MCNC: 11.5 FL (ref 9.4–12.4)
NEUTROPHILS # BLD AUTO: 5.51 K/UL (ref 1.8–7.7)
NEUTROPHILS NFR BLD AUTO: 73 % (ref 53–65)
NRBC # BLD AUTO: 0 X10E3/UL
NRBC, AUTO (.00): 0 %
PLATELET # BLD AUTO: 207 K/UL (ref 150–400)
POTASSIUM SERPL-SCNC: 3.1 MMOL/L (ref 3.5–5.1)
RBC # BLD AUTO: 3.17 M/UL (ref 4.2–5.4)
SODIUM SERPL-SCNC: 135 MMOL/L (ref 136–145)
WBC # BLD AUTO: 7.55 K/UL (ref 4.5–11)

## 2023-09-05 PROCEDURE — S4991 NICOTINE PATCH NONLEGEND: HCPCS | Performed by: NURSE PRACTITIONER

## 2023-09-05 PROCEDURE — 63600175 PHARM REV CODE 636 W HCPCS: Performed by: NURSE PRACTITIONER

## 2023-09-05 PROCEDURE — 80048 BASIC METABOLIC PNL TOTAL CA: CPT | Performed by: NURSE PRACTITIONER

## 2023-09-05 PROCEDURE — 85025 COMPLETE CBC W/AUTO DIFF WBC: CPT | Performed by: NURSE PRACTITIONER

## 2023-09-05 PROCEDURE — 27000221 HC OXYGEN, UP TO 24 HOURS

## 2023-09-05 PROCEDURE — 25000003 PHARM REV CODE 250: Performed by: NURSE PRACTITIONER

## 2023-09-05 PROCEDURE — 94761 N-INVAS EAR/PLS OXIMETRY MLT: CPT

## 2023-09-05 RX ORDER — HYDROCODONE BITARTRATE AND ACETAMINOPHEN 5; 325 MG/1; MG/1
1 TABLET ORAL EVERY 4 HOURS PRN
Qty: 20 TABLET | Refills: 0 | Status: SHIPPED | OUTPATIENT
Start: 2023-09-05 | End: 2023-09-22 | Stop reason: CLARIF

## 2023-09-05 RX ORDER — ENOXAPARIN SODIUM 100 MG/ML
40 INJECTION SUBCUTANEOUS DAILY
Qty: 21 EACH | Refills: 0 | Status: SHIPPED | OUTPATIENT
Start: 2023-09-05 | End: 2023-09-22

## 2023-09-05 RX ADMIN — HYDROCODONE BITARTRATE AND ACETAMINOPHEN 1 TABLET: 5; 325 TABLET ORAL at 01:09

## 2023-09-05 RX ADMIN — NICOTINE 1 PATCH: 14 PATCH, EXTENDED RELEASE TRANSDERMAL at 08:09

## 2023-09-05 RX ADMIN — HYDROCODONE BITARTRATE AND ACETAMINOPHEN 1 TABLET: 5; 325 TABLET ORAL at 04:09

## 2023-09-05 RX ADMIN — SODIUM CHLORIDE: 9 INJECTION, SOLUTION INTRAVENOUS at 07:09

## 2023-09-05 RX ADMIN — ONDANSETRON 4 MG: 2 INJECTION INTRAMUSCULAR; INTRAVENOUS at 04:09

## 2023-09-05 NOTE — PLAN OF CARE
Problem: Adult Inpatient Plan of Care  Goal: Plan of Care Review  Outcome: Ongoing, Progressing  Flowsheets (Taken 9/5/2023 0223)  Plan of Care Reviewed With: patient  Goal: Patient-Specific Goal (Individualized)  Outcome: Ongoing, Progressing  Flowsheets (Taken 9/5/2023 0223)  Anxieties, Fears or Concerns: None  Individualized Care Needs: Nausea and pain management  Goal: Absence of Hospital-Acquired Illness or Injury  Outcome: Ongoing, Progressing  Goal: Optimal Comfort and Wellbeing  Outcome: Ongoing, Progressing  Goal: Readiness for Transition of Care  Outcome: Ongoing, Progressing     Problem: Infection  Goal: Absence of Infection Signs and Symptoms  Outcome: Ongoing, Progressing     Problem: Fall Injury Risk  Goal: Absence of Fall and Fall-Related Injury  Outcome: Ongoing, Progressing     Problem: Gas Exchange Impaired  Goal: Optimal Gas Exchange  Outcome: Ongoing, Progressing

## 2023-09-05 NOTE — HOSPITAL COURSE
Low anterior rectal resection by Dr. العلي on 08/30/2023.  Doing well postoperatively.  Intermittently has cough induced vomiting.  However, denies nausea or vomiting between episodes of coughing.  Nausea is controlled with Zofran.  Tolerating regular diet.  Abdominal pain controlled.  Midline incision well approximated with staples intact without erythema or drainage.  Passing stool and flatus.  Patient will discharge home today and follow-up in clinic.  Surgical path as follows:    A. Colon, proximal and distal anastomosis, resection:  - Colonic tissue without diagnostic abnormality  - No overt malignancy is identified     B. Rectum, low anterior resection:    - Moderately-differentiated adenocarcinoma (2.7 cm)  - Tumor penetrates the muscularis propria and extends into pericolorectal soft tissue  - Hyperplastic polyp  - Fifteen lymph nodes are negative for metastatic adenocarcinoma (0/15)  - Margins of resection are negative for adenocarcinoma  - AJCC eighth edition pathologic stage: pT3 N0

## 2023-09-05 NOTE — NURSING
Educated pt on administering Lovenox injections at home. Pt states she has given insulin shots to her family so she is aware. Enforced importance of only giving Lovenox in the abdomen and to rotate areas. Pt voiced understanding.

## 2023-09-05 NOTE — DISCHARGE SUMMARY
[facility]  General Surgery  Discharge Summary      Patient Name: Bipin Bassett  MRN: [id number]  Admission Date: 8/30/2023  Hospital Length of Stay: 6 days  Discharge Date and Time:  09/05/2023 12:41 PM  Attending Physician: Joce العلي MD   Discharging Provider: PETRA Delgado  Primary Care Provider: [facility] Primary [facility]    HPI:   No notes on file    Procedure(s) (LRB):  EXAM UNDER ANESTHESIA, DIGITAL, RECTUM (N/A)  PROCTOSCOPY (N/A)  RESECTION, RECTUM, LOW ANTERIOR (N/A)      Indwelling Lines/Drains at time of discharge:   Lines/Drains/Airways     Drain  Duration           Female External Urinary Catheter 09/02/23 1935 2 days              Hospital Course: Low anterior rectal resection by Dr. العلي on 08/30/2023.  Doing well postoperatively.  Intermittently has cough induced vomiting.  However, denies nausea or vomiting between episodes of coughing.  Nausea is controlled with Zofran.  Tolerating regular diet.  Abdominal pain controlled.  Midline incision well approximated with staples intact without erythema or drainage.  Passing stool and flatus.  Patient will discharge home today and follow-up in clinic.  Surgical path as follows:    A. Colon, proximal and distal anastomosis, resection:  - Colonic tissue without diagnostic abnormality  - No overt malignancy is identified     B. Rectum, low anterior resection:    - Moderately-differentiated adenocarcinoma (2.7 cm)  - Tumor penetrates the muscularis propria and extends into pericolorectal soft tissue  - Hyperplastic polyp  - Fifteen lymph nodes are negative for metastatic adenocarcinoma (0/15)  - Margins of resection are negative for adenocarcinoma  - AJCC eighth edition pathologic stage: pT3 N0      Goals of Care Treatment Preferences:  Code Status: Full Code      Consults:     Significant Diagnostic Studies: Labs:   CMP   Recent Labs   Lab 09/04/23  0527 09/05/23  0540    135*   K 3.2* 3.1*   CL 98 97*   CO2 30 30   GLU 99  109*   BUN 4* 3*   CREATININE 0.38* 0.39*   CALCIUM 9.0 8.3*   ANIONGAP 12 11    and CBC   Recent Labs   Lab 09/04/23  0527 09/05/23  0540   WBC 7.14 7.55   HGB 12.1 10.8*   HCT 34.3* 31.0*    207       Pending Diagnostic Studies:     None        Final Active Diagnoses:    Diagnosis Date Noted POA    PRINCIPAL PROBLEM:  Rectal cancer [C20] 07/31/2023 Yes      Problems Resolved During this Admission:      Discharged Condition: stable    Disposition: Home or Self Care    Follow Up:   Follow-up Information     العلي, Joce LUCERO MD. Schedule an appointment as soon as possible for a visit in 2 week(s).    Specialties: General Surgery, Surgery  Contact information:  [address] [place] [id number]  [phone]                       Patient Instructions:      Diet Adult Regular     Lifting restrictions   Order Comments: No lifting over 10 lb until clinic follow-up     Notify your health care provider if you experience any of the following:  temperature >100.4     Notify your health care provider if you experience any of the following:  persistent nausea and vomiting or diarrhea     Notify your health care provider if you experience any of the following:  severe uncontrolled pain     Notify your health care provider if you experience any of the following:  redness, tenderness, or signs of infection (pain, swelling, redness, odor or green/yellow discharge around incision site)     Reason for not Ordering Smoking Cessation Referral     Order Specific Question Answer Comments   Reason for not ordering: Not medically appropriate at this time Defer to PCP     Reason for not Prescribing Nicotine Replacement     Order Specific Question Answer Comments   Reason for not Prescribing: Not medically appropriate at this time Defer to PCP     Activity as tolerated     Shower on day dressing removed (No bath)   Order Comments: Shower daily, plans midline incision with separate cloth with soap and water.  Midline incision can  remain open to air unless drainage present.  If drainage present, cover with gauze and tape.     Medications:  Reconciled Home Medications:      Medication List      START taking these medications    enoxaparin 40 mg/0.4 mL Syrg  Commonly known as: LOVENOX  Inject 0.4 mLs (40 mg total) into the skin once daily.     HYDROcodone-acetaminophen 5-325 mg per tablet  Commonly known as: NORCO  Take 1 tablet by mouth every 4 (four) hours as needed for Pain.        STOP taking these medications    metroNIDAZOLE 500 MG tablet  Commonly known as: FLAGYL          Time spent on the discharge of patient: 15 minutes    PETRA Delgado  General Surgery  [facility]

## 2023-09-05 NOTE — PLAN OF CARE
[facility]  Discharge Final Note    Primary Care Provider: No, Primary Doctor    Expected Discharge Date: 9/5/2023    Final Discharge Note (most recent)       Final Note - 09/05/23 1308          Final Note    Assessment Type Final Discharge Note     Anticipated Discharge Disposition Home or Self Care     What phone number can be called within the next 1-3 days to see how you are doing after discharge? [phone]        Post-Acute Status    Discharge Delays None known at this time                     Contact Info       Joce العلي MD   Specialty: General Surgery, Surgery    [address] [place] [id number]   Phone: [phone]       Next Steps: Schedule an appointment as soon as possible for a visit in 2 week(s)          Pt to dc home with family. 0 dc needs noted.

## 2023-09-05 NOTE — PLAN OF CARE
Chart reviewed. + flatus. On CLD. Nausea improved. Awaiting BM for dc. Ss following for dc needs and recommendations.

## 2023-09-15 ENCOUNTER — OFFICE VISIT (OUTPATIENT)
Dept: SURGERY | Facility: CLINIC | Age: 63
End: 2023-09-15
Payer: MEDICARE

## 2023-09-15 DIAGNOSIS — C20 RECTAL CANCER: Primary | ICD-10-CM

## 2023-09-15 PROCEDURE — 99024 PR POST-OP FOLLOW-UP VISIT: ICD-10-PCS | Mod: S$PBB,,, | Performed by: NURSE PRACTITIONER

## 2023-09-15 PROCEDURE — 1160F PR REVIEW ALL MEDS BY PRESCRIBER/CLIN PHARMACIST DOCUMENTED: ICD-10-PCS | Mod: CPTII,,, | Performed by: NURSE PRACTITIONER

## 2023-09-15 PROCEDURE — 99213 OFFICE O/P EST LOW 20 MIN: CPT | Mod: PBBFAC | Performed by: NURSE PRACTITIONER

## 2023-09-15 PROCEDURE — 99024 POSTOP FOLLOW-UP VISIT: CPT | Mod: S$PBB,,, | Performed by: NURSE PRACTITIONER

## 2023-09-15 PROCEDURE — 1111F PR DISCHARGE MEDS RECONCILED W/ CURRENT OUTPATIENT MED LIST: ICD-10-PCS | Mod: CPTII,,, | Performed by: NURSE PRACTITIONER

## 2023-09-15 PROCEDURE — 1159F PR MEDICATION LIST DOCUMENTED IN MEDICAL RECORD: ICD-10-PCS | Mod: CPTII,,, | Performed by: NURSE PRACTITIONER

## 2023-09-15 PROCEDURE — 1111F DSCHRG MED/CURRENT MED MERGE: CPT | Mod: CPTII,,, | Performed by: NURSE PRACTITIONER

## 2023-09-15 PROCEDURE — 1160F RVW MEDS BY RX/DR IN RCRD: CPT | Mod: CPTII,,, | Performed by: NURSE PRACTITIONER

## 2023-09-15 PROCEDURE — 1159F MED LIST DOCD IN RCRD: CPT | Mod: CPTII,,, | Performed by: NURSE PRACTITIONER

## 2023-09-15 NOTE — PROGRESS NOTES
Post-operative Note    HPI:  The patient is status post low anterior rectal resection.  Adenocarcinoma on pathology with clear margins and no jannie involvement.  Patient is still having some cramping with loose stool.  Is having with a 1 loose stool daily.  Stool is brown without blood.    PHYSICAL EXAM:    Abdomen soft, nontender, nondistended.  Midline incision well approximated with staples intact.    Recent Results (from the past 504 hour(s))   Comprehensive Metabolic Panel    Collection Time: 08/28/23  1:56 PM   Result Value Ref Range    Sodium 139 136 - 145 mmol/L    Potassium 3.5 3.5 - 5.1 mmol/L    Chloride 106 98 - 107 mmol/L    CO2 29 21 - 32 mmol/L    Anion Gap 8 7 - 16 mmol/L    Glucose 74 74 - 106 mg/dL    BUN 6 (L) 7 - 18 mg/dL    Creatinine 0.66 0.55 - 1.02 mg/dL    BUN/Creatinine Ratio 9 6 - 20    Calcium 9.1 8.5 - 10.1 mg/dL    Total Protein 7.8 6.4 - 8.2 g/dL    Albumin 3.5 3.5 - 5.0 g/dL    Globulin 4.3 (H) 2.0 - 4.0 g/dL    A/G Ratio 0.8     Bilirubin, Total 0.6 >0.0 - 1.2 mg/dL    Alk Phos 78 50 - 130 U/L    ALT 20 13 - 56 U/L    AST 27 15 - 37 U/L    eGFR 98 >=60 mL/min/1.73m2   Type & Screen    Collection Time: 08/28/23  1:56 PM   Result Value Ref Range    Specimen Outdate 08/31/2023 23:59     Group & Rh B POS     Indirect [facility] NEG    CBC with Differential    Collection Time: 08/28/23  1:56 PM   Result Value Ref Range    WBC 5.67 4.50 - 11.00 K/uL    RBC 3.98 (L) 4.20 - 5.40 M/uL    Hemoglobin 13.7 12.0 - 16.0 g/dL    Hematocrit 39.5 38.0 - 47.0 %    MCV 99.2 (H) 80.0 - 96.0 fL    MCH 34.4 (H) 27.0 - 31.0 pg    MCHC 34.7 32.0 - 36.0 g/dL    RDW 13.5 11.5 - 14.5 %    Platelet Count 239 150 - 400 K/uL    MPV 11.5 9.4 - 12.4 fL    Neutrophils % 46.0 (L) 53.0 - 65.0 %    Lymphocytes % 37.7 27.0 - 41.0 %    Monocytes % 11.6 (H) 2.0 - 6.0 %    Eosinophils % 3.4 1.0 - 4.0 %    Basophils % 0.9 0.0 - 1.0 %     Immature Granulocytes % 0.4 0.0 - 0.4 %    nRBC, Auto 0.0 <=0.0 %    Neutrophils, Abs 2.61 1.80 - 7.70 K/uL    Lymphocytes, Absolute 2.14 1.00 - 4.80 K/uL    Monocytes, Absolute 0.66 0.00 - 0.80 K/uL    Eosinophils, Absolute 0.19 0.00 - 0.50 K/uL    Basophils, Absolute 0.05 0.00 - 0.20 K/uL    Immature Granulocytes, Absolute 0.02 0.00 - 0.04 K/uL    nRBC, Absolute 0.00 <=0.00 x10e3/uL    Diff Type Auto    Surgical Pathology    Collection Time: 08/30/23 10:35 AM   Result Value Ref Range    Case Report       Surgical Pathology                                Case: [id number]                                   Authorizing Provider:  Joce العلي MD          Collected:           08/30/2023 10:35 AM          Ordering Location:     [facility] -     Received:            08/30/2023 12:21 PM                                 [facility]                                                              Pathologist:           Thor Garg MD                                                      Specimens:   A) - Colon, proximal and distal anastomosis tisssue of colon                                        B) - Rectum, rectal mass                                                                   Final Diagnosis       A. Colon, proximal and distal anastomosis, resection:  - Colonic tissue without diagnostic abnormality  - No overt malignancy is identified    B. Rectum, low anterior resection:    - Moderately-differentiated adenocarcinoma (2.7 cm)  - Tumor penetrates the muscularis propria and extends into pericolorectal soft tissue  - Hyperplastic polyp  - Fifteen lymph nodes are negative for metastatic adenocarcinoma (0/15)  - Margins of resection are negative for adenocarcinoma  - AJCC eighth edition pathologic stage: pT3 N0      Comments       VVG stains performed on blocks B6, B7, and B8 fail to demonstrate definitive vascular invasion and support the diagnosis.       Gross Description       A. Colon, proximal  and distal anastomosis tisssue of colon :   The specimen is received in formalin designated proximal and distal anastomosis tissue of colon and consists of 2 colonic donuts measuring 2.0 x 1.8 x 0.9 cm and 2.4 x 1.5 x 1.0 cm respectively.  The donuts are not differentiated.  The mucosal surfaces are gray-tan and wrinkled.  No lesions are identified.  Representative sections are submitted in cassette A1.    Grossing was completed by Ryan Aguayo.  JERO Rectum, rectal mass :   The specimen is received in formalin designated rectal mass and consists of a low anterior resection measuring 17.0 cm in length by 3.5 cm in maximum diameter.  The specimen is not oriented.  The outer surface is mostly covered by pink-tan smooth serosa.  One end is dark red and shaggy.  The peritoneal reflection is identified 5.0 cm from the rectal margin.  The mucosa is generally tan with the appropriate mucosal folds.  There is a dark red, oval shaped sessile mass measuring 2.7 x 2.2 x 0.6 cm.  The mass is located 2.5 cm from the rectal margin, 12.0 cm from the opposite end, and approximately 4.0 cm from the mesorectum.  The radial margin below the mass is inked blue, and the nearest mesorectum margin is inked yellow.  Sectioning reveals the mass is confined to the mucosa.  There is a 0.4 cm white-tan mucosal polyp located 1.8 cm from the mass and 1.0 cm from the rectal margin.  No other lesions or masses are identified.  Fourteen lymph node candidates are dissected from the fat ranging in measurement from 0.1-1.0 cm in greatest dimension.  Representative sections are submitted as follows:  B1 rectal margin nearest mass, B2 en face mucosal margin from opposite end, B3 mesorectum margin nearest mass, B4-B9 mass entirely submitted, B10 mucosal polyp, B11 6 intact lymph node candidates, B12 5 lymph node candidates (1 differentially inked and bisected, B13 3 lymph node candidates (differentially inked and bisected).    Grossing was  completed by Ryan Aguayo.      Microscopic Description       A microscopic examination was performed and the diagnosis reflects the findings.          Synoptic Checklist       COLON AND RECTUM: Resection, Including Transanal Disk Excision of Rectal Neoplasms   COLON AND RECTUM: RESECTION - All Specimens   8th Edition - Protocol posted: 2/26/2020      SPECIMEN      Procedure:    Low anterior resection       Macroscopic Evaluation of Mesorectum:    Complete       TUMOR      Tumor Site:    Rectum       Histologic Type:    Adenocarcinoma       Histologic Grade:    G2: Moderately differentiated       Tumor Size:    Greatest dimension (Centimeters): 2.7 cm      Tumor Extension:    Tumor invades through the muscularis propria into pericolorectal tissue       Macroscopic Tumor Perforation:    Not identified       Lymphovascular Invasion:    Not identified       Perineural Invasion:    Not identified       Treatment Effect:    No known presurgical therapy       MARGINS      Margins:    All margins are uninvolved by invasive carcinoma, high grade dysplasia / intramucosal carcinoma, and low grade dysplasia         Margins Examined:    Proximal         Margins Examined:    Distal         Margins Examined:    Radial (circumferential) or Mesenteric         Distance of Invasive Carcinoma from Closest Margin:    2.5 cm        Closest Margin:    Distal         Distance of Tumor from Radial (circumferential) Margin:    4 cm      LYMPH NODES      Number of Lymph Nodes Involved:    0       Number of Lymph Nodes Examined:    15       Tumor Deposits:    Not identified       PATHOLOGIC STAGE CLASSIFICATION (pTNM, AJCC 8th Edition)            Primary Tumor (pT):    pT3       Regional Lymph Nodes (pN):    pN0       Laboratory Notes       If this report includes immunohistochemical (IHC) test results, please note the following: IHC studies were interpreted in conjunction with appropriate positive and negative controls which  demonstrate the expected positive and negative reactivity. This laboratory is regulated under CLIA as qualified to perform high-complexity testing. IHC tests are used for clinical purposes. They should not be regarded as investigational or research.       Comprehensive metabolic panel    Collection Time: 08/31/23  4:02 AM   Result Value Ref Range    Sodium 142 136 - 145 mmol/L    Potassium 3.7 3.5 - 5.1 mmol/L    Chloride 114 (H) 98 - 107 mmol/L    CO2 21 21 - 32 mmol/L    Anion Gap 11 7 - 16 mmol/L    Glucose 93 74 - 106 mg/dL    BUN 5 (L) 7 - 18 mg/dL    Creatinine 0.57 0.55 - 1.02 mg/dL    BUN/Creatinine Ratio 9 6 - 20    Calcium 8.2 (L) 8.5 - 10.1 mg/dL    Total Protein 5.9 (L) 6.4 - 8.2 g/dL    Albumin 2.5 (L) 3.5 - 5.0 g/dL    Globulin 3.4 2.0 - 4.0 g/dL    A/G Ratio 0.7     Bilirubin, Total 0.5 >0.0 - 1.2 mg/dL    Alk Phos 55 50 - 130 U/L    ALT 12 (L) 13 - 56 U/L    AST 19 15 - 37 U/L    eGFR 102 >=60 mL/min/1.73m2   CBC with Differential    Collection Time: 08/31/23  4:02 AM   Result Value Ref Range    WBC 20.05 (H) 4.50 - 11.00 K/uL    RBC 3.66 (L) 4.20 - 5.40 M/uL    Hemoglobin 12.6 12.0 - 16.0 g/dL    Hematocrit 37.0 (L) 38.0 - 47.0 %    .1 (H) 80.0 - 96.0 fL    MCH 34.4 (H) 27.0 - 31.0 pg    MCHC 34.1 32.0 - 36.0 g/dL    RDW 13.4 11.5 - 14.5 %    Platelet Count 180 150 - 400 K/uL    MPV 10.9 9.4 - 12.4 fL    Neutrophils % 83.8 (H) 53.0 - 65.0 %    Lymphocytes % 8.3 (L) 27.0 - 41.0 %    Monocytes % 6.6 (H) 2.0 - 6.0 %    Eosinophils % 0.3 (L) 1.0 - 4.0 %    Basophils % 0.2 0.0 - 1.0 %    Immature Granulocytes % 0.8 (H) 0.0 - 0.4 %    nRBC, Auto 0.0 <=0.0 %    Neutrophils, Abs 16.77 (H) 1.80 - 7.70 K/uL    Lymphocytes, Absolute 1.67 1.00 - 4.80 K/uL    Monocytes, Absolute 1.33 (H) 0.00 - 0.80 K/uL    Eosinophils, Absolute 0.07 0.00 - 0.50 K/uL    Basophils, Absolute 0.04 0.00 - 0.20 K/uL    Immature Granulocytes, Absolute 0.17 (H) 0.00 - 0.04 K/uL    nRBC, Absolute 0.00 <=0.00 x10e3/uL    Diff Type  Auto    Basic Metabolic Panel    Collection Time: 09/01/23 10:02 AM   Result Value Ref Range    Sodium 138 136 - 145 mmol/L    Potassium 3.7 3.5 - 5.1 mmol/L    Chloride 108 (H) 98 - 107 mmol/L    CO2 23 21 - 32 mmol/L    Anion Gap 11 7 - 16 mmol/L    Glucose 59 (L) 74 - 106 mg/dL    BUN 5 (L) 7 - 18 mg/dL    Creatinine 0.46 (L) 0.55 - 1.02 mg/dL    BUN/Creatinine Ratio 11 6 - 20    Calcium 8.4 (L) 8.5 - 10.1 mg/dL    eGFR 107 >=60 mL/min/1.73m2   CBC with Differential    Collection Time: 09/01/23 10:02 AM   Result Value Ref Range    WBC 12.93 (H) 4.50 - 11.00 K/uL    RBC 3.38 (L) 4.20 - 5.40 M/uL    Hemoglobin 11.5 (L) 12.0 - 16.0 g/dL    Hematocrit 34.8 (L) 38.0 - 47.0 %    .0 (H) 80.0 - 96.0 fL    MCH 34.0 (H) 27.0 - 31.0 pg    MCHC 33.0 32.0 - 36.0 g/dL    RDW 13.2 11.5 - 14.5 %    Platelet Count 164 150 - 400 K/uL    MPV 11.1 9.4 - 12.4 fL    Neutrophils % 81.1 (H) 53.0 - 65.0 %    Lymphocytes % 10.9 (L) 27.0 - 41.0 %    Monocytes % 6.9 (H) 2.0 - 6.0 %    Eosinophils % 0.2 (L) 1.0 - 4.0 %    Basophils % 0.4 0.0 - 1.0 %    Immature Granulocytes % 0.5 (H) 0.0 - 0.4 %    nRBC, Auto 0.0 <=0.0 %    Neutrophils, Abs 10.49 (H) 1.80 - 7.70 K/uL    Lymphocytes, Absolute 1.41 1.00 - 4.80 K/uL    Monocytes, Absolute 0.89 (H) 0.00 - 0.80 K/uL    Eosinophils, Absolute 0.03 0.00 - 0.50 K/uL    Basophils, Absolute 0.05 0.00 - 0.20 K/uL    Immature Granulocytes, Absolute 0.06 (H) 0.00 - 0.04 K/uL    nRBC, Absolute 0.00 <=0.00 x10e3/uL    Diff Type Auto    Basic Metabolic Panel    Collection Time: 09/02/23  4:03 AM   Result Value Ref Range    Sodium 133 (L) 136 - 145 mmol/L    Potassium 3.2 (L) 3.5 - 5.1 mmol/L    Chloride 100 98 - 107 mmol/L    CO2 25 21 - 32 mmol/L    Anion Gap 11 7 - 16 mmol/L    Glucose 84 74 - 106 mg/dL    BUN 4 (L) 7 - 18 mg/dL    Creatinine 0.35 (L) 0.55 - 1.02 mg/dL    BUN/Creatinine Ratio 11 6 - 20    Calcium 9.1 8.5 - 10.1 mg/dL    eGFR 114 >=60 mL/min/1.73m2   CBC with Differential     Collection Time: 09/02/23  4:03 AM   Result Value Ref Range    WBC 10.40 4.50 - 11.00 K/uL    RBC 3.65 (L) 4.20 - 5.40 M/uL    Hemoglobin 12.4 12.0 - 16.0 g/dL    Hematocrit 35.5 (L) 38.0 - 47.0 %    MCV 97.3 (H) 80.0 - 96.0 fL    MCH 34.0 (H) 27.0 - 31.0 pg    MCHC 34.9 32.0 - 36.0 g/dL    RDW 12.1 11.5 - 14.5 %    Platelet Count 177 150 - 400 K/uL    MPV 11.6 9.4 - 12.4 fL    Neutrophils % 75.1 (H) 53.0 - 65.0 %    Lymphocytes % 14.6 (L) 27.0 - 41.0 %    Monocytes % 8.8 (H) 2.0 - 6.0 %    Eosinophils % 0.8 (L) 1.0 - 4.0 %    Basophils % 0.3 0.0 - 1.0 %    Immature Granulocytes % 0.4 0.0 - 0.4 %    nRBC, Auto 0.0 <=0.0 %    Neutrophils, Abs 7.81 (H) 1.80 - 7.70 K/uL    Lymphocytes, Absolute 1.52 1.00 - 4.80 K/uL    Monocytes, Absolute 0.92 (H) 0.00 - 0.80 K/uL    Eosinophils, Absolute 0.08 0.00 - 0.50 K/uL    Basophils, Absolute 0.03 0.00 - 0.20 K/uL    Immature Granulocytes, Absolute 0.04 0.00 - 0.04 K/uL    nRBC, Absolute 0.00 <=0.00 x10e3/uL    Diff Type Auto    Basic Metabolic Panel    Collection Time: 09/03/23  5:26 AM   Result Value Ref Range    Sodium 137 136 - 145 mmol/L    Potassium 3.5 3.5 - 5.1 mmol/L    Chloride 98 98 - 107 mmol/L    CO2 31 21 - 32 mmol/L    Anion Gap 12 7 - 16 mmol/L    Glucose 89 74 - 106 mg/dL    BUN 4 (L) 7 - 18 mg/dL    Creatinine 0.42 (L) 0.55 - 1.02 mg/dL    BUN/Creatinine Ratio 10 6 - 20    Calcium 8.7 8.5 - 10.1 mg/dL    eGFR 109 >=60 mL/min/1.73m2   CBC with Differential    Collection Time: 09/03/23  5:26 AM   Result Value Ref Range    WBC 6.91 4.50 - 11.00 K/uL    RBC 3.78 (L) 4.20 - 5.40 M/uL    Hemoglobin 13.0 12.0 - 16.0 g/dL    Hematocrit 37.4 (L) 38.0 - 47.0 %    MCV 98.9 (H) 80.0 - 96.0 fL    MCH 34.4 (H) 27.0 - 31.0 pg    MCHC 34.8 32.0 - 36.0 g/dL    RDW 12.1 11.5 - 14.5 %    Platelet Count 199 150 - 400 K/uL    MPV 11.6 9.4 - 12.4 fL    Neutrophils % 66.7 (H) 53.0 - 65.0 %    Lymphocytes % 19.5 (L) 27.0 - 41.0 %    Monocytes % 12.4 (H) 2.0 - 6.0 %    Eosinophils %  0.6 (L) 1.0 - 4.0 %    Basophils % 0.4 0.0 - 1.0 %    Immature Granulocytes % 0.4 0.0 - 0.4 %    nRBC, Auto 0.0 <=0.0 %    Neutrophils, Abs 4.60 1.80 - 7.70 K/uL    Lymphocytes, Absolute 1.35 1.00 - 4.80 K/uL    Monocytes, Absolute 0.86 (H) 0.00 - 0.80 K/uL    Eosinophils, Absolute 0.04 0.00 - 0.50 K/uL    Basophils, Absolute 0.03 0.00 - 0.20 K/uL    Immature Granulocytes, Absolute 0.03 0.00 - 0.04 K/uL    nRBC, Absolute 0.00 <=0.00 x10e3/uL    Diff Type Auto    Basic Metabolic Panel    Collection Time: 09/04/23  5:27 AM   Result Value Ref Range    Sodium 137 136 - 145 mmol/L    Potassium 3.2 (L) 3.5 - 5.1 mmol/L    Chloride 98 98 - 107 mmol/L    CO2 30 21 - 32 mmol/L    Anion Gap 12 7 - 16 mmol/L    Glucose 99 74 - 106 mg/dL    BUN 4 (L) 7 - 18 mg/dL    Creatinine 0.38 (L) 0.55 - 1.02 mg/dL    BUN/Creatinine Ratio 11 6 - 20    Calcium 9.0 8.5 - 10.1 mg/dL    eGFR 112 >=60 mL/min/1.73m2   CBC with Differential    Collection Time: 09/04/23  5:27 AM   Result Value Ref Range    WBC 7.14 4.50 - 11.00 K/uL    RBC 3.54 (L) 4.20 - 5.40 M/uL    Hemoglobin 12.1 12.0 - 16.0 g/dL    Hematocrit 34.3 (L) 38.0 - 47.0 %    MCV 96.9 (H) 80.0 - 96.0 fL    MCH 34.2 (H) 27.0 - 31.0 pg    MCHC 35.3 32.0 - 36.0 g/dL    RDW 11.9 11.5 - 14.5 %    Platelet Count 201 150 - 400 K/uL    MPV 11.3 9.4 - 12.4 fL    Neutrophils % 76.5 (H) 53.0 - 65.0 %    Lymphocytes % 10.6 (L) 27.0 - 41.0 %    Monocytes % 11.5 (H) 2.0 - 6.0 %    Eosinophils % 0.7 (L) 1.0 - 4.0 %    Basophils % 0.3 0.0 - 1.0 %    Immature Granulocytes % 0.4 0.0 - 0.4 %    nRBC, Auto 0.0 <=0.0 %    Neutrophils, Abs 5.46 1.80 - 7.70 K/uL    Lymphocytes, Absolute 0.76 (L) 1.00 - 4.80 K/uL    Monocytes, Absolute 0.82 (H) 0.00 - 0.80 K/uL    Eosinophils, Absolute 0.05 0.00 - 0.50 K/uL    Basophils, Absolute 0.02 0.00 - 0.20 K/uL    Immature Granulocytes, Absolute 0.03 0.00 - 0.04 K/uL    nRBC, Absolute 0.00 <=0.00 x10e3/uL    Diff Type Auto    Basic Metabolic Panel    Collection Time:  09/05/23  5:40 AM   Result Value Ref Range    Sodium 135 (L) 136 - 145 mmol/L    Potassium 3.1 (L) 3.5 - 5.1 mmol/L    Chloride 97 (L) 98 - 107 mmol/L    CO2 30 21 - 32 mmol/L    Anion Gap 11 7 - 16 mmol/L    Glucose 109 (H) 74 - 106 mg/dL    BUN 3 (L) 7 - 18 mg/dL    Creatinine 0.39 (L) 0.55 - 1.02 mg/dL    BUN/Creatinine Ratio 8 6 - 20    Calcium 8.3 (L) 8.5 - 10.1 mg/dL    eGFR 111 >=60 mL/min/1.73m2   CBC with Differential    Collection Time: 09/05/23  5:40 AM   Result Value Ref Range    WBC 7.55 4.50 - 11.00 K/uL    RBC 3.17 (L) 4.20 - 5.40 M/uL    Hemoglobin 10.8 (L) 12.0 - 16.0 g/dL    Hematocrit 31.0 (L) 38.0 - 47.0 %    MCV 97.8 (H) 80.0 - 96.0 fL    MCH 34.1 (H) 27.0 - 31.0 pg    MCHC 34.8 32.0 - 36.0 g/dL    RDW 11.9 11.5 - 14.5 %    Platelet Count 207 150 - 400 K/uL    MPV 11.5 9.4 - 12.4 fL    Neutrophils % 73.0 (H) 53.0 - 65.0 %    Lymphocytes % 10.7 (L) 27.0 - 41.0 %    Monocytes % 13.8 (H) 2.0 - 6.0 %    Eosinophils % 1.7 1.0 - 4.0 %    Basophils % 0.3 0.0 - 1.0 %    Immature Granulocytes % 0.5 (H) 0.0 - 0.4 %    nRBC, Auto 0.0 <=0.0 %    Neutrophils, Abs 5.51 1.80 - 7.70 K/uL    Lymphocytes, Absolute 0.81 (L) 1.00 - 4.80 K/uL    Monocytes, Absolute 1.04 (H) 0.00 - 0.80 K/uL    Eosinophils, Absolute 0.13 0.00 - 0.50 K/uL    Basophils, Absolute 0.02 0.00 - 0.20 K/uL    Immature Granulocytes, Absolute 0.04 0.00 - 0.04 K/uL    nRBC, Absolute 0.00 <=0.00 x10e3/uL    Diff Type Auto         ASSESSMENT:      The patient is  doing well postoperatively, with the exception of intermittent cramping and loose stool.       PLAN:      Follow up  if cramping and loose stool persists, otherwise as needed. .    Has follow-up scheduled with GI next week.

## 2023-09-19 ENCOUNTER — HOSPITAL ENCOUNTER (OUTPATIENT)
Dept: RADIOLOGY | Facility: HOSPITAL | Age: 63
Discharge: HOME | End: 2023-09-19
Attending: NURSE PRACTITIONER
Payer: MEDICARE

## 2023-09-19 ENCOUNTER — OFFICE VISIT (OUTPATIENT)
Dept: GASTROENTEROLOGY | Facility: CLINIC | Age: 63
End: 2023-09-19
Payer: MEDICARE

## 2023-09-19 VITALS
HEART RATE: 86 BPM | HEIGHT: 65 IN | SYSTOLIC BLOOD PRESSURE: 113 MMHG | BODY MASS INDEX: 12.56 KG/M2 | DIASTOLIC BLOOD PRESSURE: 72 MMHG | WEIGHT: 75.38 LBS

## 2023-09-19 DIAGNOSIS — C20 RECTAL CANCER: Primary | ICD-10-CM

## 2023-09-19 DIAGNOSIS — R10.9 ABDOMINAL PAIN, UNSPECIFIED ABDOMINAL LOCATION: ICD-10-CM

## 2023-09-19 DIAGNOSIS — K59.00 CONSTIPATION, UNSPECIFIED CONSTIPATION TYPE: ICD-10-CM

## 2023-09-19 DIAGNOSIS — R63.4 WEIGHT LOSS: ICD-10-CM

## 2023-09-19 DIAGNOSIS — D50.8 OTHER IRON DEFICIENCY ANEMIA: ICD-10-CM

## 2023-09-19 DIAGNOSIS — C18.9 MALIGNANT NEOPLASM OF COLON, UNSPECIFIED PART OF COLON: ICD-10-CM

## 2023-09-19 PROCEDURE — 74018 XR KUB: ICD-10-PCS | Mod: 26,,, | Performed by: RADIOLOGY

## 2023-09-19 PROCEDURE — 3074F SYST BP LT 130 MM HG: CPT | Mod: CPTII,,, | Performed by: NURSE PRACTITIONER

## 2023-09-19 PROCEDURE — 3078F PR MOST RECENT DIASTOLIC BLOOD PRESSURE < 80 MM HG: ICD-10-PCS | Mod: CPTII,,, | Performed by: NURSE PRACTITIONER

## 2023-09-19 PROCEDURE — 1111F DSCHRG MED/CURRENT MED MERGE: CPT | Mod: CPTII,,, | Performed by: NURSE PRACTITIONER

## 2023-09-19 PROCEDURE — 74018 RADEX ABDOMEN 1 VIEW: CPT | Mod: 26,,, | Performed by: RADIOLOGY

## 2023-09-19 PROCEDURE — 74018 RADEX ABDOMEN 1 VIEW: CPT | Mod: TC

## 2023-09-19 PROCEDURE — 3078F DIAST BP <80 MM HG: CPT | Mod: CPTII,,, | Performed by: NURSE PRACTITIONER

## 2023-09-19 PROCEDURE — 99214 OFFICE O/P EST MOD 30 MIN: CPT | Mod: S$PBB,,, | Performed by: NURSE PRACTITIONER

## 2023-09-19 PROCEDURE — 99214 PR OFFICE/OUTPT VISIT, EST, LEVL IV, 30-39 MIN: ICD-10-PCS | Mod: S$PBB,,, | Performed by: NURSE PRACTITIONER

## 2023-09-19 PROCEDURE — 3008F PR BODY MASS INDEX (BMI) DOCUMENTED: ICD-10-PCS | Mod: CPTII,,, | Performed by: NURSE PRACTITIONER

## 2023-09-19 PROCEDURE — 1111F PR DISCHARGE MEDS RECONCILED W/ CURRENT OUTPATIENT MED LIST: ICD-10-PCS | Mod: CPTII,,, | Performed by: NURSE PRACTITIONER

## 2023-09-19 PROCEDURE — 99214 OFFICE O/P EST MOD 30 MIN: CPT | Mod: PBBFAC | Performed by: NURSE PRACTITIONER

## 2023-09-19 PROCEDURE — 1159F MED LIST DOCD IN RCRD: CPT | Mod: CPTII,,, | Performed by: NURSE PRACTITIONER

## 2023-09-19 PROCEDURE — 1159F PR MEDICATION LIST DOCUMENTED IN MEDICAL RECORD: ICD-10-PCS | Mod: CPTII,,, | Performed by: NURSE PRACTITIONER

## 2023-09-19 PROCEDURE — 3074F PR MOST RECENT SYSTOLIC BLOOD PRESSURE < 130 MM HG: ICD-10-PCS | Mod: CPTII,,, | Performed by: NURSE PRACTITIONER

## 2023-09-19 PROCEDURE — 3008F BODY MASS INDEX DOCD: CPT | Mod: CPTII,,, | Performed by: NURSE PRACTITIONER

## 2023-09-19 NOTE — PROGRESS NOTES
Bipin Bassett is a 62 y.o. female here for No chief complaint on file.        PCP: No, Primary Doctor  Referring Provider: No referring provider defined for this encounter.     HPI:  Presents for follow-up after rectal resection due to rectal cancer.  Colonoscopy on 07/31/2023, report reviewed, tubular adenoma in the descending, and well-differentiated adenocarcinoma in the rectum.  Patient had a lower anterior rectal resection on 08/30/2023, pathology reviewed, moderately differentiated adenocarcinoma which penetrates the muscularis propria, 15 nodes negative for adenocarcinoma with negative margins.  CEA on 07/31 was normal.  Patient has continued to lose weight.  Her weight is now down to 75 lb today.  Reports that she is eating.  Reports that she is having abdominal pain in the right lower quadrant of her abdomen.  She has been taking Norco.  She endorses constipation.  Denies hematochezia.  I did advise her that Norco may increase constipation therefore increasing abdominal bloating and pain.  Incision is well healed.  She has had iron-deficiency anemia.  On 09/05 HGB was 10.8 and 31.  HIV is negative.  TSH is normal.  She is a smoker and has a 50 year pack history of smoking.  Chest x-ray on 07/15 does show chronic lung changes.  She is not had a previous chest CT.          ROS:  Review of Systems   Constitutional:  Positive for appetite change (decreased), fatigue and unexpected weight change. Negative for fever.   HENT:  Negative for trouble swallowing.    Respiratory:  Positive for shortness of breath (with exertion and fatigue).    Cardiovascular:  Negative for chest pain.   Gastrointestinal:  Positive for abdominal pain and constipation. Negative for anal bleeding, blood in stool, change in bowel habit, diarrhea, nausea, vomiting, reflux and change in bowel habit.   Musculoskeletal:  Negative for gait problem.   Integumentary:  Negative for pallor.   Psychiatric/Behavioral:  The patient is not  "nervous/anxious.           PMHX:  has a past medical history of IBS (irritable bowel syndrome).    PSHX:  has a past surgical history that includes Digital rectal examination under anesthesia (N/A, 8/30/2023); Proctoscopy (N/A, 8/30/2023); and resection, rectum, low anterior (N/A, 8/30/2023).    PFHX: family history includes Cancer in her father, maternal grandmother, and mother.    PSlHX:  reports that she has been smoking cigarettes. She started smoking about 50 years ago. She has a 50.1 pack-year smoking history. She has never used smokeless tobacco. She reports current alcohol use of about 2.0 standard drinks of alcohol per week. She reports that she does not use drugs.        Review of patient's allergies indicates:   Allergen Reactions    Asa [aspirin] Anaphylaxis    Penicillins Anaphylaxis       Medication List with Changes/Refills   Current Medications    ENOXAPARIN (LOVENOX) 40 MG/0.4 ML SYRG    Inject 0.4 mLs (40 mg total) into the skin once daily.    HYDROCODONE-ACETAMINOPHEN (NORCO) 5-325 MG PER TABLET    Take 1 tablet by mouth every 4 (four) hours as needed for Pain.        Objective Findings:  Vital Signs:  /72   Pulse 86   Ht 5' 5" (1.651 m)   Wt 34.2 kg (75 lb 6.4 oz)   LMP  (LMP Unknown)   BMI 12.55 kg/m²  Body mass index is 12.55 kg/m².    Physical Exam:  Physical Exam  Vitals and nursing note reviewed.   Constitutional:       General: She is not in acute distress.     Appearance: She is cachectic.   HENT:      Mouth/Throat:      Mouth: Mucous membranes are moist.   Cardiovascular:      Rate and Rhythm: Normal rate.   Pulmonary:      Effort: Pulmonary effort is normal.      Breath sounds: No wheezing, rhonchi or rales.   Abdominal:      General: Bowel sounds are normal. There is no distension.      Palpations: Abdomen is soft. There is no mass.      Tenderness: There is abdominal tenderness in the right lower quadrant. There is no guarding.      Comments: Well healed incision with steri " strips still in place   Skin:     General: Skin is warm and dry.      Coloration: Skin is not jaundiced or pale.   Neurological:      Mental Status: She is alert and oriented to person, place, and time.   Psychiatric:         Mood and Affect: Mood normal.          Labs:  Lab Results   Component Value Date    WBC 6.18 09/19/2023    HGB 12.8 09/19/2023    HCT 38.6 09/19/2023    .0 (H) 09/19/2023    RDW 13.2 09/19/2023     (H) 09/19/2023    LYMPH 37.5 09/19/2023    LYMPH 2.32 09/19/2023    MONO 11.2 (H) 09/19/2023    EOS 0.13 09/19/2023    BASO 0.07 09/19/2023     Lab Results   Component Value Date     (L) 09/05/2023    K 3.1 (L) 09/05/2023    CL 97 (L) 09/05/2023    CO2 30 09/05/2023     (H) 09/05/2023    BUN 3 (L) 09/05/2023    CREATININE 0.39 (L) 09/05/2023    CALCIUM 8.3 (L) 09/05/2023    PROT 5.9 (L) 08/31/2023    ALBUMIN 2.5 (L) 08/31/2023    BILITOT 0.5 08/31/2023    ALKPHOS 55 08/31/2023    AST 19 08/31/2023    ALT 12 (L) 08/31/2023         Imaging: No results found.      Assessment:  Bipin Bassett is a 62 y.o. female here with:  1. Rectal cancer    2. Malignant neoplasm of colon, unspecified part of colon    3. Weight loss    4. Abdominal pain, unspecified abdominal location    5. Constipation, unspecified constipation type    6. Other iron deficiency anemia          Recommendations:  1. CT chest, abdomen, and pelvis due to continued weight loss, cachexia, abdominal pain, recent diagnosis of rectal cancer with subsequent rectal resection.  Adenocarcinoma  2. KUB today  3. CBC, CMP, iron studies  4. Refer to Oncology    This patient was discussed and reviewed with Dr. Boyce prior to referral to oncology    Follow up in about 4 weeks (around 10/17/2023).      Order summary:  Orders Placed This Encounter    CT Chest Abdomen Pelvis W W/O Contrast (XPD)    X-Ray KUB    CBC Auto Differential    Comprehensive Metabolic Panel    Iron and TIBC    Ferritin    Ambulatory referral/consult  to Oncology       Thank you for allowing me to participate in the care of Bipin Bassett.      LEVAR Lawrence

## 2023-09-20 ENCOUNTER — TELEPHONE (OUTPATIENT)
Dept: GASTROENTEROLOGY | Facility: CLINIC | Age: 63
End: 2023-09-20
Payer: MEDICARE

## 2023-09-20 NOTE — TELEPHONE ENCOUNTER
Results called to patient. Verbalized understanding.            ----- Message from PETRA Hunt sent at 9/19/2023  4:26 PM CDT -----  Liver enzymes are normal.  No anemia.

## 2023-09-20 NOTE — TELEPHONE ENCOUNTER
Results and recommendations called to patient. Verbalized understanding. Able to move up CT to 9/22/23 at 8:30am. Patient aware.               ----- Message from PETRA Hunt sent at 9/19/2023  4:13 PM CDT -----  See if they can move CT scan.  X-ray is read as normal.  If the patient has increasing abdominal pain inability to pass stool or nausea and vomiting she should go to the ER.

## 2023-09-22 ENCOUNTER — HOSPITAL ENCOUNTER (OUTPATIENT)
Dept: RADIOLOGY | Facility: HOSPITAL | Age: 63
Discharge: HOME | End: 2023-09-22
Attending: NURSE PRACTITIONER
Payer: MEDICARE

## 2023-09-22 ENCOUNTER — HOSPITAL ENCOUNTER (EMERGENCY)
Facility: HOSPITAL | Age: 63
Discharge: HOME | End: 2023-09-22
Attending: EMERGENCY MEDICINE
Payer: MEDICARE

## 2023-09-22 ENCOUNTER — TELEPHONE (OUTPATIENT)
Dept: GASTROENTEROLOGY | Facility: CLINIC | Age: 63
End: 2023-09-22
Payer: MEDICARE

## 2023-09-22 VITALS
WEIGHT: 80.5 LBS | SYSTOLIC BLOOD PRESSURE: 116 MMHG | BODY MASS INDEX: 13.41 KG/M2 | HEART RATE: 63 BPM | OXYGEN SATURATION: 98 % | RESPIRATION RATE: 14 BRPM | DIASTOLIC BLOOD PRESSURE: 61 MMHG | TEMPERATURE: 98 F | HEIGHT: 65 IN

## 2023-09-22 DIAGNOSIS — R10.9 ABDOMINAL PAIN, UNSPECIFIED ABDOMINAL LOCATION: Primary | ICD-10-CM

## 2023-09-22 DIAGNOSIS — C20 RECTAL CANCER: ICD-10-CM

## 2023-09-22 DIAGNOSIS — R63.4 WEIGHT LOSS: ICD-10-CM

## 2023-09-22 DIAGNOSIS — C18.9 MALIGNANT NEOPLASM OF COLON, UNSPECIFIED PART OF COLON: ICD-10-CM

## 2023-09-22 PROBLEM — C19 COLORECTAL CANCER: Status: ACTIVE | Noted: 2023-09-22

## 2023-09-22 PROCEDURE — 71270 CT THORAX DX C-/C+: CPT | Mod: 26,,, | Performed by: RADIOLOGY

## 2023-09-22 PROCEDURE — 71270 CT CHEST ABDOMEN PELVIS W W/O CONTRAST (XPD): ICD-10-PCS | Mod: 26,,, | Performed by: RADIOLOGY

## 2023-09-22 PROCEDURE — 99283 PR EMERGENCY DEPT VISIT,LEVEL III: ICD-10-PCS | Mod: ,,, | Performed by: EMERGENCY MEDICINE

## 2023-09-22 PROCEDURE — 99284 EMERGENCY DEPT VISIT MOD MDM: CPT | Mod: 25

## 2023-09-22 PROCEDURE — 63600175 PHARM REV CODE 636 W HCPCS: Performed by: EMERGENCY MEDICINE

## 2023-09-22 PROCEDURE — 25500020 PHARM REV CODE 255: Performed by: NURSE PRACTITIONER

## 2023-09-22 PROCEDURE — 74178 CT ABD&PLV WO CNTR FLWD CNTR: CPT | Mod: 26,,, | Performed by: RADIOLOGY

## 2023-09-22 PROCEDURE — 99283 EMERGENCY DEPT VISIT LOW MDM: CPT | Mod: ,,, | Performed by: EMERGENCY MEDICINE

## 2023-09-22 PROCEDURE — 96372 THER/PROPH/DIAG INJ SC/IM: CPT | Performed by: EMERGENCY MEDICINE

## 2023-09-22 PROCEDURE — 74178 CT CHEST ABDOMEN PELVIS W W/O CONTRAST (XPD): ICD-10-PCS | Mod: 26,,, | Performed by: RADIOLOGY

## 2023-09-22 PROCEDURE — 74178 CT ABD&PLV WO CNTR FLWD CNTR: CPT | Mod: TC

## 2023-09-22 RX ORDER — DICYCLOMINE HYDROCHLORIDE 20 MG/1
20 TABLET ORAL 2 TIMES DAILY
Qty: 20 TABLET | Refills: 0 | Status: SHIPPED | OUTPATIENT
Start: 2023-09-22 | End: 2023-10-22

## 2023-09-22 RX ORDER — DICYCLOMINE HYDROCHLORIDE 10 MG/ML
20 INJECTION INTRAMUSCULAR
Status: COMPLETED | OUTPATIENT
Start: 2023-09-22 | End: 2023-09-22

## 2023-09-22 RX ADMIN — IOPAMIDOL 100 ML: 755 INJECTION, SOLUTION INTRAVENOUS at 09:09

## 2023-09-22 RX ADMIN — DICYCLOMINE HYDROCHLORIDE 20 MG: 10 INJECTION INTRAMUSCULAR at 09:09

## 2023-09-22 NOTE — TELEPHONE ENCOUNTER
Results and recommendations called to patient. Verbalized understanding.              ----- Message from PETRA Hunt sent at 9/22/2023 11:19 AM CDT -----  CT chest abdomen and pelvis does not explain weight loss.  Films were also reviewed by  and Dr. العلي.  Fluid collection is thought to be postsurgical changes.  She is currently or has been afebrile.  If the patient has increasing abdominal pain, fever, inability to have a bowel movement, or nausea and vomiting she should go to the ER.

## 2023-09-23 NOTE — ED PROVIDER NOTES
Encounter Date: 9/22/2023    SCRIBE #1 NOTE: I, Guadalupe Leger, am scribing for, and in the presence of,  Madhav Short MD. I have scribed the entire note.       History     Chief Complaint   Patient presents with    Abdominal Pain     62 year old female presents to the ED complaining of abdominal pain. She states that she started having the pain today after having a CT abdomen/pelvis done. She states that on 8-30-23 she had a colon resection done by Dr. العلي. She denies any other symptoms at this time. She has a past medical history of IBS.    The history is provided by the patient and the EMS personnel. No  was used.     Review of patient's allergies indicates:   Allergen Reactions    Asa [aspirin] Anaphylaxis    Penicillins Anaphylaxis     Past Medical History:   Diagnosis Date    Colorectal cancer 9/22/2023    Duodenitis 6/10/2023     Past Surgical History:   Procedure Laterality Date    DIGITAL RECTAL EXAMINATION UNDER ANESTHESIA N/A 8/30/2023    Procedure: EXAM UNDER ANESTHESIA, DIGITAL, RECTUM;  Surgeon: Joce العلي MD;  Location: [facility] OR;  Service: General;  Laterality: N/A;    PROCTOSCOPY N/A 8/30/2023    Procedure: PROCTOSCOPY;  Surgeon: Joce العلي MD;  Location: [facility] OR;  Service: General;  Laterality: N/A;    RESECTION, RECTUM, LOW ANTERIOR N/A 8/30/2023    Procedure: RESECTION, RECTUM, LOW ANTERIOR;  Surgeon: Joce العلي MD;  Location: [facility] OR;  Service: General;  Laterality: N/A;     Family History   Problem Relation Age of Onset    Cancer Mother     Cancer Father     Cancer Maternal Grandmother      Social History     Tobacco Use    Smoking status: Every Day     Current packs/day: 1.00     Average packs/day: 1 pack/day for 50.1 years (50.1 ttl pk-yrs)     Types: Cigarettes     Start date: 7/31/1973    Smokeless tobacco: Never   Substance Use Topics    Alcohol use: Yes     Alcohol/week: 2.0 standard drinks of alcohol     Types: 2 Cans of beer per week      Comment: daily    Drug use: Never     Review of Systems   Constitutional:  Negative for fever.   Gastrointestinal:  Positive for abdominal pain. Negative for nausea and vomiting.   All other systems reviewed and are negative.      Physical Exam     Initial Vitals [09/22/23 2041]   BP Pulse Resp Temp SpO2   (!) 95/54 80 14 98.2 °F (36.8 °C) 98 %      MAP       --         Physical Exam    Nursing note and vitals reviewed.  Eyes: EOM are normal.   Cardiovascular:  Normal rate, regular rhythm and normal heart sounds.           Pulmonary/Chest: Breath sounds normal. No respiratory distress.   Abdominal: Bowel sounds are normal. There is abdominal tenderness.     Neurological: She is alert and oriented to person, place, and time.         ED Course   Procedures  Labs Reviewed - No data to display       Imaging Results              XR ABDOMEN, ACUTE 2 OR MORE VIEWS WITH CHEST (In process)                   X-Rays:   Independently Interpreted Readings:   Abdomen:   Flat and Erect of Abdomen - Nonspecific bowel gas.  No free air under diaphragm.  No air fluid levels or signs of obstruction.     Medications   dicyclomine injection 20 mg (20 mg Intramuscular Given 9/22/23 2124)     Medical Decision Making  62 year old female presents to the ED complaining of abdominal pain. She states that she started having the pain today after having a CT abdomen/pelvis done. She states that on 8-30-23 she had a colon resection done by Dr. العلي. She denies any other symptoms at this time. She has a past medical history of IBS.  Physical examination:  There is fresh scar in the median and mild tenderness of the lower abdomen       Amount and/or Complexity of Data Reviewed  Radiology: ordered and independent interpretation performed.  Discussion of management or test interpretation with external provider(s): I reviewed all the labs and workup which was done earlier in the day including the CT scan.  I feel I can discharge the patient home  to follow-up with her primary care provider.    Risk  Prescription drug management.              Attending Attestation:           Physician Attestation for Scribe:  Physician Attestation Statement for Scribe #1: I, Madhav Short MD, reviewed documentation, as scribed by Guadalupe Leger in my presence, and it is both accurate and complete.                             Clinical Impression:   Final diagnoses:  [R10.9] Abdominal pain, unspecified abdominal location (Primary)        ED Disposition Condition    Discharge Stable          ED Prescriptions       Medication Sig Dispense Start Date End Date Auth. Provider    dicyclomine (BENTYL) 20 mg tablet Take 1 tablet (20 mg total) by mouth 2 (two) times daily. 20 tablet 9/22/2023 10/22/2023 Madhav Short MD          Follow-up Information    None          Madhav Short MD  09/23/23 0034

## 2023-09-23 NOTE — ED TRIAGE NOTES
Presents to the emergency department c/o increased abdominal pain since having a CT abd/pelvis earlier today. Patient had a colon resection by Dr. العلي on 08/30/2023. Denies fever,N/V. States that she last had a bowel movement about 1100 today, but it was very loose.

## 2023-10-18 ENCOUNTER — OFFICE VISIT (OUTPATIENT)
Dept: GASTROENTEROLOGY | Facility: CLINIC | Age: 63
End: 2023-10-18
Payer: COMMERCIAL

## 2023-10-18 VITALS
SYSTOLIC BLOOD PRESSURE: 135 MMHG | DIASTOLIC BLOOD PRESSURE: 84 MMHG | WEIGHT: 77.38 LBS | HEART RATE: 106 BPM | HEIGHT: 65 IN | BODY MASS INDEX: 12.89 KG/M2

## 2023-10-18 DIAGNOSIS — C19 COLORECTAL CANCER: Primary | ICD-10-CM

## 2023-10-18 DIAGNOSIS — R63.4 WEIGHT LOSS: ICD-10-CM

## 2023-10-18 PROCEDURE — 1160F RVW MEDS BY RX/DR IN RCRD: CPT | Mod: CPTII,,, | Performed by: NURSE PRACTITIONER

## 2023-10-18 PROCEDURE — 99213 OFFICE O/P EST LOW 20 MIN: CPT | Mod: PBBFAC | Performed by: NURSE PRACTITIONER

## 2023-10-18 PROCEDURE — 1101F PR PT FALLS ASSESS DOC 0-1 FALLS W/OUT INJ PAST YR: ICD-10-PCS | Mod: CPTII,,, | Performed by: NURSE PRACTITIONER

## 2023-10-18 PROCEDURE — 3008F BODY MASS INDEX DOCD: CPT | Mod: CPTII,,, | Performed by: NURSE PRACTITIONER

## 2023-10-18 PROCEDURE — 3288F PR FALLS RISK ASSESSMENT DOCUMENTED: ICD-10-PCS | Mod: CPTII,,, | Performed by: NURSE PRACTITIONER

## 2023-10-18 PROCEDURE — 3079F PR MOST RECENT DIASTOLIC BLOOD PRESSURE 80-89 MM HG: ICD-10-PCS | Mod: CPTII,,, | Performed by: NURSE PRACTITIONER

## 2023-10-18 PROCEDURE — 3008F PR BODY MASS INDEX (BMI) DOCUMENTED: ICD-10-PCS | Mod: CPTII,,, | Performed by: NURSE PRACTITIONER

## 2023-10-18 PROCEDURE — 3079F DIAST BP 80-89 MM HG: CPT | Mod: CPTII,,, | Performed by: NURSE PRACTITIONER

## 2023-10-18 PROCEDURE — 1101F PT FALLS ASSESS-DOCD LE1/YR: CPT | Mod: CPTII,,, | Performed by: NURSE PRACTITIONER

## 2023-10-18 PROCEDURE — 1160F PR REVIEW ALL MEDS BY PRESCRIBER/CLIN PHARMACIST DOCUMENTED: ICD-10-PCS | Mod: CPTII,,, | Performed by: NURSE PRACTITIONER

## 2023-10-18 PROCEDURE — 3075F SYST BP GE 130 - 139MM HG: CPT | Mod: CPTII,,, | Performed by: NURSE PRACTITIONER

## 2023-10-18 PROCEDURE — 99214 PR OFFICE/OUTPT VISIT, EST, LEVL IV, 30-39 MIN: ICD-10-PCS | Mod: S$PBB,,, | Performed by: NURSE PRACTITIONER

## 2023-10-18 PROCEDURE — 99214 OFFICE O/P EST MOD 30 MIN: CPT | Mod: S$PBB,,, | Performed by: NURSE PRACTITIONER

## 2023-10-18 PROCEDURE — 1159F PR MEDICATION LIST DOCUMENTED IN MEDICAL RECORD: ICD-10-PCS | Mod: CPTII,,, | Performed by: NURSE PRACTITIONER

## 2023-10-18 PROCEDURE — 3288F FALL RISK ASSESSMENT DOCD: CPT | Mod: CPTII,,, | Performed by: NURSE PRACTITIONER

## 2023-10-18 PROCEDURE — 3075F PR MOST RECENT SYSTOLIC BLOOD PRESS GE 130-139MM HG: ICD-10-PCS | Mod: CPTII,,, | Performed by: NURSE PRACTITIONER

## 2023-10-18 PROCEDURE — 1159F MED LIST DOCD IN RCRD: CPT | Mod: CPTII,,, | Performed by: NURSE PRACTITIONER

## 2023-10-18 NOTE — PROGRESS NOTES
Bipin Bassett is a 63 y.o. female here for No chief complaint on file.        PCP: No, Primary Doctor  Referring Provider: No referring provider defined for this encounter.     HPI:  Presents for follow-up after rectal resection due to rectal cancer.  Colonoscopy on 07/31/2023, report reviewed, tubular adenoma in the descending, and well-differentiated adenocarcinoma in the rectum.  Patient had a lower anterior rectal resection on 08/30/2023, pathology reviewed, moderately differentiated adenocarcinoma which penetrates the muscularis propria, 15 nodes negative for adenocarcinoma with negative margins.  CEA on 07/31 was normal.  Patient has continued to lose weight.  Reports that she is eating and does have an appetite.  I have encouraged her to drink boost or ensure.  She had a CT chest abdomen and pelvis on 09/19, report reviewed,  CT chest abdomen and pelvis does not explain weight loss.  CEA 2.6.  Labs from 09/19 reviewed, no anemia, no iron-deficiency.  Films were also reviewed by  and Dr. العلي.  Fluid collection was thought to be postsurgical changes.  Continues to report abdominal pain.  Reports that she does have intermittent cramping in the left lower quadrant of her abdomen.  She denies constipation at this time.  She has been previously constipated.  She has an appointment with Dr. Savage 2 day at 2:30 p.m..          ROS:  Review of Systems   Constitutional:  Negative for appetite change, fatigue, fever and unexpected weight change.   HENT:  Negative for trouble swallowing.    Respiratory:  Negative for shortness of breath.    Cardiovascular:  Negative for chest pain.   Gastrointestinal:  Negative for abdominal pain, blood in stool, change in bowel habit, constipation, diarrhea, nausea, vomiting and reflux.   Musculoskeletal:  Negative for back pain, gait problem and joint swelling.   Integumentary:  Negative for pallor and rash.   Allergic/Immunologic: Negative for immunocompromised  "state.   Neurological:  Negative for dizziness.   Hematological:  Does not bruise/bleed easily.   Psychiatric/Behavioral:  The patient is not nervous/anxious.           PMHX:  has a past medical history of Colorectal cancer (9/22/2023) and Duodenitis (6/10/2023).    PSHX:  has a past surgical history that includes Digital rectal examination under anesthesia (N/A, 8/30/2023); Proctoscopy (N/A, 8/30/2023); and resection, rectum, low anterior (N/A, 8/30/2023).    PFHX: family history includes Cancer in her father, maternal grandmother, and mother.    PSlHX:  reports that she has been smoking cigarettes. She started smoking about 50 years ago. She has a 50.2 pack-year smoking history. She has never used smokeless tobacco. She reports current alcohol use of about 2.0 standard drinks of alcohol per week. She reports that she does not use drugs.        Review of patient's allergies indicates:   Allergen Reactions    Asa [aspirin] Anaphylaxis    Penicillins Anaphylaxis       Medication List with Changes/Refills   Current Medications    DICYCLOMINE (BENTYL) 20 MG TABLET    Take 1 tablet (20 mg total) by mouth 2 (two) times daily.        Objective Findings:  Vital Signs:  /84   Pulse 106   Ht 5' 5" (1.651 m)   Wt 35.1 kg (77 lb 6.4 oz)   LMP  (LMP Unknown)   BMI 12.88 kg/m²  Body mass index is 12.88 kg/m².    Physical Exam:  Physical Exam  Vitals and nursing note reviewed.   Constitutional:       General: She is not in acute distress.     Appearance: Normal appearance. She is underweight.   HENT:      Mouth/Throat:      Mouth: Mucous membranes are dry.   Cardiovascular:      Rate and Rhythm: Normal rate.   Pulmonary:      Effort: Pulmonary effort is normal.      Breath sounds: No wheezing, rhonchi or rales.   Abdominal:      General: Bowel sounds are normal. There is no distension.      Palpations: Abdomen is soft. There is no mass.      Tenderness: There is no abdominal tenderness. There is no guarding.   Skin:    "  General: Skin is warm and dry.      Coloration: Skin is not jaundiced or pale.   Neurological:      Mental Status: She is alert and oriented to person, place, and time.   Psychiatric:         Mood and Affect: Mood normal.          Labs:  Lab Results   Component Value Date    WBC 6.18 09/19/2023    HGB 12.8 09/19/2023    HCT 38.6 09/19/2023    .0 (H) 09/19/2023    RDW 13.2 09/19/2023     (H) 09/19/2023    LYMPH 37.5 09/19/2023    LYMPH 2.32 09/19/2023    MONO 11.2 (H) 09/19/2023    EOS 0.13 09/19/2023    BASO 0.07 09/19/2023     Lab Results   Component Value Date     09/19/2023    K 4.3 09/19/2023     09/19/2023    CO2 27 09/19/2023     09/19/2023    BUN 7 09/19/2023    CREATININE 0.63 09/19/2023    CALCIUM 10.0 09/19/2023    PROT 8.5 (H) 09/19/2023    ALBUMIN 3.7 09/19/2023    BILITOT 0.4 09/19/2023    ALKPHOS 73 09/19/2023    AST 27 09/19/2023    ALT 20 09/19/2023         Imaging: XR ABDOMEN, ACUTE 2 OR MORE VIEWS WITH CHEST    Result Date: 9/23/2023  EXAMINATION: XR ABDOMEN ACUTE 2 OR MORE VIEWS WITH CHEST CLINICAL HISTORY: Abdominal pain; TECHNIQUE: PA chest radiograph, supine and erect views of the abdomen COMPARISON: None FINDINGS: Lungs clear.  Bowel gas pattern normal.  No abnormal calcifications.  Moderate colonic stool.  No pneumoperitoneum.     Moderate colonic stool. Electronically signed by: Duke Harden Date:    09/23/2023 Time:    07:55    CT Chest Abdomen Pelvis W W/O Contrast (XPD)    Result Date: 9/22/2023  EXAMINATION: CT CHEST ABDOMEN PELVIS W W/O CONTRAST (XPD) CLINICAL HISTORY: Weight loss, unintended; Malignant neoplasm of colon, unspecified  Abdomen pain. TECHNIQUE: Axial CT imaging of the chest, abdomen and pelvis is performed without and with intravenous and oral contrast. Contrast dose is 100 cc of Omnipaque 350. COMPARISON: None available FINDINGS: CT chest: Heart, mediastinum within normal limits. The great vessels show no evidence of abnormality No evidence  of lung parenchymal abnormality seen. No pneumothorax or effusion is present.  No other chest wall abnormalities are identified. CT abdomen: The liver, spleen, pancreas, adrenal glands and kidneys are normal in size and enhancement.  No evidence of focal lesion is demonstrated in the solid organs.  The bowel caliber is normal and no wall thickening or adjacent inflammatory change is seen.  No evidence of free fluid or free air is present. CT pelvis: There is complex fluid collection with peripheral enhancement in the presacral space to the right of midline measures 3.2 x 2.0 cm.  Surgical changes at the rectosigmoid area.  Distinct mass not seen.  Bowel and bladder appear within normal limits.  The uterus and ovaries not identified.  I.     Complex fluid collection right presacral space could indicate infection.  No other definite acute findings.  No definite findings of malignant process. Electronically signed by: Zion Martin Date:    09/22/2023 Time:    09:53    X-Ray KUB    Result Date: 9/19/2023  EXAMINATION: XR KUB CLINICAL HISTORY: Abdominal pain COMPARISON: 17 August 2023 TECHNIQUE: XR KUB FINDINGS: No free fluid or free air seen.  The bowel gas pattern appears within normal limits.  No abnormal calcifications are present.  No other abnormality is identified.     No evidence of abnormality demonstrated Electronically signed by: Zion Martin Date:    09/19/2023 Time:    10:57        Assessment:  Bipin Bassett is a 63 y.o. female here with:  1. Colorectal cancer    2. Weight loss          Recommendations:  1. See Dr. Savage today as scheduled  2. Discussed the patient's continued weight loss with Dr. Boyce. Consider PET  3. Will need B 12 and folate level  4. Request notes from Dr. Savage    Follow up in about 4 weeks (around 11/15/2023).      Order summary:       Thank you for allowing me to participate in the care of Bipin Bassett.      LEVAR Lawrence

## 2023-10-24 ENCOUNTER — OFFICE VISIT (OUTPATIENT)
Dept: SURGERY | Facility: CLINIC | Age: 63
End: 2023-10-24
Attending: SURGERY
Payer: COMMERCIAL

## 2023-10-24 DIAGNOSIS — Z09 POSTOP CHECK: Primary | ICD-10-CM

## 2023-10-24 DIAGNOSIS — C19 COLORECTAL CANCER: ICD-10-CM

## 2023-10-24 DIAGNOSIS — R10.32 LEFT LOWER QUADRANT ABDOMINAL PAIN: ICD-10-CM

## 2023-10-24 PROBLEM — R10.9 ABDOMINAL PAIN: Status: ACTIVE | Noted: 2023-10-24

## 2023-10-24 PROCEDURE — 99024 POSTOP FOLLOW-UP VISIT: CPT | Mod: ,,, | Performed by: SURGERY

## 2023-10-24 PROCEDURE — 99024 PR POST-OP FOLLOW-UP VISIT: ICD-10-PCS | Mod: ,,, | Performed by: SURGERY

## 2023-10-24 PROCEDURE — 99212 OFFICE O/P EST SF 10 MIN: CPT | Mod: PBBFAC | Performed by: SURGERY

## 2023-10-24 NOTE — ASSESSMENT & PLAN NOTE
Suspect pain is related to constipation  No stenosis of anastamosis on digital exam today, and no blood    Treat constipation  F/u as needed with me

## 2023-10-24 NOTE — PROGRESS NOTES
Subjective:       Patient ID: Bipin Bassett is a 63 y.o. female.    Chief Complaint: No chief complaint on file.    63-year-old female patient who underwent a low anterior resection in August.  She did fairly well until about a month ago when she complained of left lower abdominal pain.  A CT scan performed revealed a fluid collection in the pelvis, which was presumed to be hematoma and abdominal fluid that was residual from surgery.  There was no evidence of anastomotic stricture at that time, but it was noted that the cecum and transverse colon were dilated with stool.    Two or 3 days ago, she began to experience small amounts of blood and what she describes as pus from the rectum.  She denies fevers or chills.  She presents for evaluation.        family history includes Cancer in her father, maternal grandmother, and mother.  Past Medical History:   Diagnosis Date    Colorectal cancer 9/22/2023    Duodenitis 6/10/2023      Past Surgical History:   Procedure Laterality Date    DIGITAL RECTAL EXAMINATION UNDER ANESTHESIA N/A 8/30/2023    Procedure: EXAM UNDER ANESTHESIA, DIGITAL, RECTUM;  Surgeon: Joce العلي MD;  Location: [facility] OR;  Service: General;  Laterality: N/A;    PROCTOSCOPY N/A 8/30/2023    Procedure: PROCTOSCOPY;  Surgeon: Joce العلي MD;  Location: [facility] OR;  Service: General;  Laterality: N/A;    RESECTION, RECTUM, LOW ANTERIOR N/A 8/30/2023    Procedure: RESECTION, RECTUM, LOW ANTERIOR;  Surgeon: Joce العلي MD;  Location: [facility] OR;  Service: General;  Laterality: N/A;       reports that she has been smoking cigarettes. She started smoking about 50 years ago. She has a 50.2 pack-year smoking history. She has never used smokeless tobacco. She reports current alcohol use of about 2.0 standard drinks of alcohol per week. She reports that she does not use drugs.     Review of Systems   All other systems reviewed and are negative.         Objective:      LMP  (LMP Unknown)     Physical Exam  Cardiovascular:      Rate and Rhythm: Normal rate.      Heart sounds: No murmur heard.  Pulmonary:      Effort: Pulmonary effort is normal. No respiratory distress.   Abdominal:      Palpations: Abdomen is soft.   Genitourinary:     Comments: Anastomosis patent by palpation.  Stool noted without blood.  There was no pus.  No perianal swelling or tenderness.  Musculoskeletal:         General: No swelling.   Skin:     Coloration: Skin is not jaundiced.   Neurological:      General: No focal deficit present.      Mental Status: She is alert.           Assessment/Plan:         Postop check    Colorectal cancer    Left lower quadrant abdominal pain         Problem List Items Addressed This Visit          Oncology    Colorectal cancer       GI    Abdominal pain    Overview     Constipation noted in cecum, transverse colon on CT from 9/22         Current Assessment & Plan     Suspect pain is related to constipation  No stenosis of anastamosis on digital exam today, and no blood    Treat constipation  F/u as needed with me            Other    Postop check - Primary

## 2023-10-24 NOTE — LETTER
October 24, 2023      [facility] - General Surgery  [address]  [place] [address]  Phone: [phone]  Fax: [phone]       Patient: Bipin Bassett   YOB: 1960  Date of Visit: 10/24/2023    To Whom It May Concern:    Bradly Bassett  was at [facility] on 10/24/2023.The patient was off on 10/20/23 with pain.  The patient may return to work on 10/25/23. If you have any questions or concerns, or if I can be of further assistance, please do not hesitate to contact me.    Sincerely,    Joce العلي M.D.

## 2024-01-03 ENCOUNTER — HOSPITAL ENCOUNTER (EMERGENCY)
Facility: HOSPITAL | Age: 64
Discharge: HOME | End: 2024-01-03
Attending: EMERGENCY MEDICINE
Payer: MEDICARE

## 2024-01-03 VITALS
HEART RATE: 78 BPM | WEIGHT: 80 LBS | HEIGHT: 65 IN | SYSTOLIC BLOOD PRESSURE: 111 MMHG | RESPIRATION RATE: 18 BRPM | DIASTOLIC BLOOD PRESSURE: 54 MMHG | TEMPERATURE: 99 F | BODY MASS INDEX: 13.33 KG/M2 | OXYGEN SATURATION: 97 %
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
## 2024-01-03 DIAGNOSIS — S00.93XA CONTUSION OF HEAD, UNSPECIFIED PART OF HEAD, INITIAL ENCOUNTER: ICD-10-CM
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
## 2024-01-03 DIAGNOSIS — S80.00XA CONTUSION OF KNEE, UNSPECIFIED LATERALITY, INITIAL ENCOUNTER: ICD-10-CM

## 2024-01-03 LAB
ALBUMIN SERPL BCP-MCNC: 3.7 G/DL (ref 3.5–5)
ALBUMIN/GLOB SERPL: 0.8 {RATIO}
ALP SERPL-CCNC: 84 U/L (ref 55–142)
ALT SERPL W P-5'-P-CCNC: 28 U/L (ref 13–56)
AMPHET UR QL SCN: NEGATIVE
ANION GAP SERPL CALCULATED.3IONS-SCNC: 10 MMOL/L (ref 7–16)
AST SERPL W P-5'-P-CCNC: 64 U/L (ref 15–37)
BACTERIA #/AREA URNS HPF: ABNORMAL /HPF
BARBITURATES UR QL SCN: NEGATIVE
BASOPHILS # BLD AUTO: 0.03 K/UL (ref 0–0.2)
BASOPHILS NFR BLD AUTO: 0.6 % (ref 0–1)
BENZODIAZ METAB UR QL SCN: NEGATIVE
BILIRUB SERPL-MCNC: 0.4 MG/DL (ref ?–1.2)
BILIRUB UR QL STRIP: NEGATIVE
BUN SERPL-MCNC: 5 MG/DL (ref 7–18)
BUN/CREAT SERPL: 7 (ref 6–20)
CALCIUM SERPL-MCNC: 10 MG/DL (ref 8.5–10.1)
CANNABINOIDS UR QL SCN: NEGATIVE
CHLORIDE SERPL-SCNC: 102 MMOL/L (ref 98–107)
CLARITY UR: CLEAR
CO2 SERPL-SCNC: 26 MMOL/L (ref 21–32)
COCAINE UR QL SCN: NEGATIVE
COLOR UR: YELLOW
CREAT SERPL-MCNC: 0.7 MG/DL (ref 0.55–1.02)
DIFFERENTIAL METHOD BLD: ABNORMAL
EGFR (NO RACE VARIABLE) (RUSH/TITUS): 96 ML/MIN/1.73M2
EOSINOPHIL # BLD AUTO: 0.03 K/UL (ref 0–0.5)
EOSINOPHIL NFR BLD AUTO: 0.6 % (ref 1–4)
ERYTHROCYTE [DISTWIDTH] IN BLOOD BY AUTOMATED COUNT: 13.4 % (ref 11.5–14.5)
ETHANOL, BLOOD (CATEGORY): NOT DETECTED
GLOBULIN SER-MCNC: 4.4 G/DL (ref 2–4)
GLUCOSE SERPL-MCNC: 106 MG/DL (ref 74–106)
GLUCOSE UR STRIP-MCNC: NORMAL MG/DL
HCT VFR BLD AUTO: 38 % (ref 38–47)
HGB BLD-MCNC: 12.9 G/DL (ref 12–16)
IMM GRANULOCYTES # BLD AUTO: 0.01 K/UL (ref 0–0.04)
IMM GRANULOCYTES NFR BLD: 0.2 % (ref 0–0.4)
KETONES UR STRIP-SCNC: NEGATIVE MG/DL
LEUKOCYTE ESTERASE UR QL STRIP: ABNORMAL
LYMPHOCYTES # BLD AUTO: 1.81 K/UL (ref 1–4.8)
LYMPHOCYTES NFR BLD AUTO: 34.7 % (ref 27–41)
MCH RBC QN AUTO: 32.8 PG (ref 27–31)
MCHC RBC AUTO-ENTMCNC: 33.9 G/DL (ref 32–36)
MCV RBC AUTO: 96.7 FL (ref 80–96)
MONOCYTES # BLD AUTO: 0.89 K/UL (ref 0–0.8)
MONOCYTES NFR BLD AUTO: 17 % (ref 2–6)
MPC BLD CALC-MCNC: 10.5 FL (ref 9.4–12.4)
MUCOUS, UA: ABNORMAL /LPF
NEUTROPHILS # BLD AUTO: 2.45 K/UL (ref 1.8–7.7)
NEUTROPHILS NFR BLD AUTO: 46.9 % (ref 53–65)
NITRITE UR QL STRIP: NEGATIVE
NRBC # BLD AUTO: 0 X10E3/UL
NRBC, AUTO (.00): 0 %
OPIATES UR QL SCN: NEGATIVE
PCP UR QL SCN: NEGATIVE
PH UR STRIP: 5.5 PH UNITS
PLATELET # BLD AUTO: 206 K/UL (ref 150–400)
POTASSIUM SERPL-SCNC: 3.5 MMOL/L (ref 3.5–5.1)
PROT SERPL-MCNC: 8.1 G/DL (ref 6.4–8.2)
PROT UR QL STRIP: 30
RBC # BLD AUTO: 3.93 M/UL (ref 4.2–5.4)
RBC # UR STRIP: ABNORMAL /UL
RBC #/AREA URNS HPF: 6 /HPF
SODIUM SERPL-SCNC: 134 MMOL/L (ref 136–145)
SP GR UR STRIP: 1.01
SQUAMOUS #/AREA URNS LPF: ABNORMAL /HPF
UROBILINOGEN UR STRIP-ACNC: NORMAL MG/DL
WBC # BLD AUTO: 5.22 K/UL (ref 4.5–11)
WBC #/AREA URNS HPF: 19 /HPF
YEAST #/AREA URNS HPF: ABNORMAL /HPF

## 2024-01-03 PROCEDURE — G0390 TRAUMA RESPONS W/HOSP CRITI: HCPCS

## 2024-01-03 PROCEDURE — 82077 ASSAY SPEC XCP UR&BREATH IA: CPT | Performed by: EMERGENCY MEDICINE

## 2024-01-03 PROCEDURE — 80053 COMPREHEN METABOLIC PANEL: CPT | Performed by: EMERGENCY MEDICINE

## 2024-01-03 PROCEDURE — 80307 DRUG TEST PRSMV CHEM ANLYZR: CPT | Performed by: EMERGENCY MEDICINE

## 2024-01-03 PROCEDURE — 99285 EMERGENCY DEPT VISIT HI MDM: CPT | Mod: 25

## 2024-01-03 PROCEDURE — 87086 URINE CULTURE/COLONY COUNT: CPT | Performed by: EMERGENCY MEDICINE

## 2024-01-03 PROCEDURE — 81001 URINALYSIS AUTO W/SCOPE: CPT | Mod: XB | Performed by: EMERGENCY MEDICINE

## 2024-01-03 PROCEDURE — 85025 COMPLETE CBC W/AUTO DIFF WBC: CPT | Performed by: EMERGENCY MEDICINE

## 2024-01-03 PROCEDURE — 99284 EMERGENCY DEPT VISIT MOD MDM: CPT | Mod: ,,, | Performed by: EMERGENCY MEDICINE
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
## 2024-01-03 NOTE — Clinical Note
"Bipin"Serge Bassett was seen and treated in our emergency department on 1/3/2024.  She may return to work on 01/05/2024.       If you have any questions or concerns, please don't hesitate to call.      MYRTLE Aguayo RN    "

## 2024-01-04 ENCOUNTER — TELEPHONE (OUTPATIENT)
Dept: EMERGENCY MEDICINE | Facility: HOSPITAL | Age: 64
End: 2024-01-04
Payer: MEDICARE

## 2024-01-05 LAB — UA COMPLETE W REFLEX CULTURE PNL UR: ABNORMAL

## 2024-01-06 RX ORDER — FLUCONAZOLE 150 MG/1
150 TABLET ORAL DAILY
Qty: 1 TABLET | Refills: 0 | Status: SHIPPED | OUTPATIENT
Start: 2024-01-06 | End: 2024-01-07

## 2024-01-08 ENCOUNTER — TELEPHONE (OUTPATIENT)
Dept: EMERGENCY MEDICINE | Facility: HOSPITAL | Age: 64
End: 2024-01-08
Payer: MEDICARE

## 2024-01-08 NOTE — TELEPHONE ENCOUNTER
----- Message from PETRA Martin sent at 1/6/2024  9:15 AM CST -----  Please notify the patient that I sent in a rx for dilfucan

## 2024-01-09 ENCOUNTER — OFFICE VISIT (OUTPATIENT)
Dept: GASTROENTEROLOGY | Facility: CLINIC | Age: 64
End: 2024-01-09
Payer: MEDICARE

## 2024-01-09 VITALS
SYSTOLIC BLOOD PRESSURE: 136 MMHG | DIASTOLIC BLOOD PRESSURE: 77 MMHG | WEIGHT: 84.38 LBS | HEIGHT: 65 IN | HEART RATE: 83 BPM | BODY MASS INDEX: 14.06 KG/M2

## 2024-01-09 DIAGNOSIS — C19 COLORECTAL CANCER: Primary | ICD-10-CM

## 2024-01-09 DIAGNOSIS — Z12.39 ENCOUNTER FOR SCREENING FOR MALIGNANT NEOPLASM OF BREAST, UNSPECIFIED SCREENING MODALITY: ICD-10-CM

## 2024-01-09 PROCEDURE — 99213 OFFICE O/P EST LOW 20 MIN: CPT | Mod: PBBFAC | Performed by: NURSE PRACTITIONER

## 2024-01-09 PROCEDURE — 1159F MED LIST DOCD IN RCRD: CPT | Mod: CPTII,,, | Performed by: NURSE PRACTITIONER

## 2024-01-09 PROCEDURE — 99214 OFFICE O/P EST MOD 30 MIN: CPT | Mod: S$PBB,,, | Performed by: NURSE PRACTITIONER

## 2024-01-09 PROCEDURE — 3008F BODY MASS INDEX DOCD: CPT | Mod: CPTII,,, | Performed by: NURSE PRACTITIONER

## 2024-01-09 PROCEDURE — 3078F DIAST BP <80 MM HG: CPT | Mod: CPTII,,, | Performed by: NURSE PRACTITIONER

## 2024-01-09 PROCEDURE — 3288F FALL RISK ASSESSMENT DOCD: CPT | Mod: CPTII,,, | Performed by: NURSE PRACTITIONER

## 2024-01-09 PROCEDURE — 3075F SYST BP GE 130 - 139MM HG: CPT | Mod: CPTII,,, | Performed by: NURSE PRACTITIONER

## 2024-01-09 PROCEDURE — 1101F PT FALLS ASSESS-DOCD LE1/YR: CPT | Mod: CPTII,,, | Performed by: NURSE PRACTITIONER

## 2024-01-09 RX ORDER — FLUCONAZOLE 150 MG/1
150 TABLET ORAL ONCE
COMMUNITY
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
## 2024-01-29 PROBLEM — Z09 POSTOP CHECK: Status: RESOLVED | Noted: 2023-10-24 | Resolved: 2024-01-29

## 2024-03-20 ENCOUNTER — HOSPITAL ENCOUNTER (OUTPATIENT)
Dept: RADIOLOGY | Facility: HOSPITAL | Age: 64
Discharge: HOME | End: 2024-03-20
Attending: NURSE PRACTITIONER
Payer: MEDICARE

## 2024-03-20 ENCOUNTER — HOSPITAL ENCOUNTER (OUTPATIENT)
Dept: RADIOLOGY | Facility: HOSPITAL | Age: 64
Discharge: HOME | End: 2024-03-20
Attending: INTERNAL MEDICINE
Payer: MEDICARE

## 2024-03-20 VITALS — WEIGHT: 84 LBS | BODY MASS INDEX: 13.99 KG/M2 | HEIGHT: 65 IN

## 2024-03-20 DIAGNOSIS — Z12.31 ENCOUNTER FOR SCREENING MAMMOGRAM FOR MALIGNANT NEOPLASM OF BREAST: ICD-10-CM

## 2024-03-20 DIAGNOSIS — C20 RECTAL CANCER: ICD-10-CM

## 2024-03-20 LAB — CREAT SERPL-MCNC: 0.7 MG/DL (ref 0.6–1.3)

## 2024-03-20 PROCEDURE — 77067 SCR MAMMO BI INCL CAD: CPT | Mod: TC

## 2024-03-20 PROCEDURE — 82565 ASSAY OF CREATININE: CPT

## 2024-03-20 PROCEDURE — 74177 CT ABD & PELVIS W/CONTRAST: CPT | Mod: 26,,, | Performed by: RADIOLOGY

## 2024-03-20 PROCEDURE — 25500020 PHARM REV CODE 255: Performed by: INTERNAL MEDICINE

## 2024-03-20 PROCEDURE — 74177 CT ABD & PELVIS W/CONTRAST: CPT | Mod: TC

## 2024-03-20 RX ADMIN — IOPAMIDOL 100 ML: 755 INJECTION, SOLUTION INTRAVENOUS at 09:03

## 2024-07-09 ENCOUNTER — OFFICE VISIT (OUTPATIENT)
Dept: GASTROENTEROLOGY | Facility: CLINIC | Age: 64
End: 2024-07-09
Payer: MEDICARE

## 2024-07-09 VITALS
BODY MASS INDEX: 14.46 KG/M2 | DIASTOLIC BLOOD PRESSURE: 75 MMHG | SYSTOLIC BLOOD PRESSURE: 159 MMHG | WEIGHT: 86.81 LBS | HEIGHT: 65 IN | HEART RATE: 74 BPM | OXYGEN SATURATION: 99 %

## 2024-07-09 DIAGNOSIS — R10.84 GENERALIZED ABDOMINAL PAIN: ICD-10-CM

## 2024-07-09 DIAGNOSIS — C19 COLORECTAL CANCER: Primary | ICD-10-CM

## 2024-07-09 PROCEDURE — 99213 OFFICE O/P EST LOW 20 MIN: CPT | Mod: PBBFAC

## 2024-07-09 PROCEDURE — 99214 OFFICE O/P EST MOD 30 MIN: CPT | Mod: S$PBB,,,

## 2024-07-09 PROCEDURE — 1159F MED LIST DOCD IN RCRD: CPT | Mod: CPTII,,,

## 2024-07-09 PROCEDURE — 3008F BODY MASS INDEX DOCD: CPT | Mod: CPTII,,,

## 2024-07-09 PROCEDURE — 99999 PR PBB SHADOW E&M-EST. PATIENT-LVL III: CPT | Mod: PBBFAC,,,

## 2024-07-09 PROCEDURE — 3077F SYST BP >= 140 MM HG: CPT | Mod: CPTII,,,

## 2024-07-09 PROCEDURE — 1160F RVW MEDS BY RX/DR IN RCRD: CPT | Mod: CPTII,,,

## 2024-07-09 PROCEDURE — 3078F DIAST BP <80 MM HG: CPT | Mod: CPTII,,,

## 2024-07-09 RX ORDER — POLYETHYLENE GLYCOL 3350, SODIUM SULFATE ANHYDROUS, SODIUM BICARBONATE, SODIUM CHLORIDE, POTASSIUM CHLORIDE 236; 22.74; 6.74; 5.86; 2.97 G/4L; G/4L; G/4L; G/4L; G/4L
4 POWDER, FOR SOLUTION ORAL ONCE
Qty: 4000 ML | Refills: 0 | Status: SHIPPED | OUTPATIENT
Start: 2024-07-09 | End: 2024-07-09
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
## 2024-07-10 ENCOUNTER — TELEPHONE (OUTPATIENT)
Dept: GASTROENTEROLOGY | Facility: CLINIC | Age: 64
End: 2024-07-10
Payer: MEDICARE

## 2024-07-10 NOTE — TELEPHONE ENCOUNTER
Results called to patient. Verbalized understanding.          ----- Message from Abbie Bolden NP sent at 7/10/2024 10:53 AM CDT -----  Labs are ok no anemia.

## 2024-07-23 RX ORDER — POLYETHYLENE GLYCOL 3350, SODIUM SULFATE ANHYDROUS, SODIUM BICARBONATE, SODIUM CHLORIDE, POTASSIUM CHLORIDE 236; 22.74; 6.74; 5.86; 2.97 G/4L; G/4L; G/4L; G/4L; G/4L
4 POWDER, FOR SOLUTION ORAL ONCE
Qty: 4000 ML | Refills: 0 | Status: SHIPPED | OUTPATIENT
Start: 2024-07-23 | End: 2024-07-23

## 2024-07-24 ENCOUNTER — HOSPITAL ENCOUNTER (OUTPATIENT)
Dept: RADIOLOGY | Facility: HOSPITAL | Age: 64
Discharge: HOME | End: 2024-07-24
Payer: MEDICARE

## 2024-07-24 DIAGNOSIS — R10.84 GENERALIZED ABDOMINAL PAIN: ICD-10-CM

## 2024-07-24 PROCEDURE — 74177 CT ABD & PELVIS W/CONTRAST: CPT | Mod: 26,,, | Performed by: RADIOLOGY

## 2024-07-24 PROCEDURE — 25500020 PHARM REV CODE 255

## 2024-07-24 PROCEDURE — 74177 CT ABD & PELVIS W/CONTRAST: CPT | Mod: TC

## 2024-07-24 RX ADMIN — IOPAMIDOL 100 ML: 755 INJECTION, SOLUTION INTRAVENOUS at 09:07
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
## 2024-07-24 NOTE — PROGRESS NOTES
CT shows Stable exam.  No evidence of metastatic disease. Make sure she comes to her colonoscopy appt as scheduled

## 2024-07-24 NOTE — LETTER
[facility]  Radiology  [address] [place] [id number]  Phone: [phone]  Fax: [phone]           Patient: Bipin Bassett   YOB: 1960   Today's Date: July 24, 2024       To Whom It May Concern:    This notice verifies that Bipin Bassett was seen in Radiology on 7/24/2024.         Sincerely,    JUAN CARLOS GORE RT

## 2024-08-01 ENCOUNTER — HOSPITAL ENCOUNTER (EMERGENCY)
Facility: HOSPITAL | Age: 64
Discharge: HOME | End: 2024-08-01
Attending: EMERGENCY MEDICINE
Payer: MEDICARE

## 2024-08-01 VITALS
TEMPERATURE: 98 F | BODY MASS INDEX: 13.83 KG/M2 | RESPIRATION RATE: 19 BRPM | HEIGHT: 65 IN | SYSTOLIC BLOOD PRESSURE: 131 MMHG | OXYGEN SATURATION: 98 % | DIASTOLIC BLOOD PRESSURE: 74 MMHG | HEART RATE: 102 BPM | WEIGHT: 83 LBS

## 2024-08-01 DIAGNOSIS — B34.9 VIRAL SYNDROME: Primary | ICD-10-CM

## 2024-08-01 PROCEDURE — 99281 EMR DPT VST MAYX REQ PHY/QHP: CPT

## 2024-08-12 ENCOUNTER — HOSPITAL ENCOUNTER (OUTPATIENT)
Dept: GASTROENTEROLOGY | Facility: HOSPITAL | Age: 64
Discharge: HOME | End: 2024-08-12
Admitting: INTERNAL MEDICINE
Payer: MEDICARE

## 2024-08-12 ENCOUNTER — ANESTHESIA (OUTPATIENT)
Dept: GASTROENTEROLOGY | Facility: HOSPITAL | Age: 64
End: 2024-08-12
Payer: MEDICARE

## 2024-08-12 ENCOUNTER — ANESTHESIA EVENT (OUTPATIENT)
Dept: GASTROENTEROLOGY | Facility: HOSPITAL | Age: 64
End: 2024-08-12
Payer: MEDICARE

## 2024-08-12 VITALS
RESPIRATION RATE: 26 BRPM | OXYGEN SATURATION: 99 % | TEMPERATURE: 98 F | HEART RATE: 51 BPM | WEIGHT: 90 LBS | HEIGHT: 65 IN | SYSTOLIC BLOOD PRESSURE: 168 MMHG | BODY MASS INDEX: 14.99 KG/M2 | DIASTOLIC BLOOD PRESSURE: 73 MMHG

## 2024-08-12 DIAGNOSIS — C19 COLORECTAL CANCER: ICD-10-CM

## 2024-08-12 DIAGNOSIS — R10.84 GENERALIZED ABDOMINAL PAIN: ICD-10-CM

## 2024-08-12 PROCEDURE — 27201423 OPTIME MED/SURG SUP & DEVICES STERILE SUPPLY

## 2024-08-12 PROCEDURE — 27000284 HC CANNULA NASAL: Performed by: NURSE ANESTHETIST, CERTIFIED REGISTERED

## 2024-08-12 PROCEDURE — 37000009 HC ANESTHESIA EA ADD 15 MINS

## 2024-08-12 PROCEDURE — 37000008 HC ANESTHESIA 1ST 15 MINUTES

## 2024-08-12 PROCEDURE — 88305 TISSUE EXAM BY PATHOLOGIST: CPT | Mod: 26,,, | Performed by: PATHOLOGY

## 2024-08-12 PROCEDURE — 63600175 PHARM REV CODE 636 W HCPCS: Performed by: NURSE ANESTHETIST, CERTIFIED REGISTERED

## 2024-08-12 PROCEDURE — 25000003 PHARM REV CODE 250: Performed by: NURSE ANESTHETIST, CERTIFIED REGISTERED

## 2024-08-12 PROCEDURE — 88305 TISSUE EXAM BY PATHOLOGIST: CPT | Mod: TC,SUR | Performed by: INTERNAL MEDICINE

## 2024-08-12 RX ORDER — PROPOFOL 10 MG/ML
VIAL (ML) INTRAVENOUS
Status: DISCONTINUED | OUTPATIENT
Start: 2024-08-12 | End: 2024-08-12

## 2024-08-12 RX ORDER — SODIUM CHLORIDE, SODIUM LACTATE, POTASSIUM CHLORIDE, CALCIUM CHLORIDE 600; 310; 30; 20 MG/100ML; MG/100ML; MG/100ML; MG/100ML
INJECTION, SOLUTION INTRAVENOUS CONTINUOUS
Status: DISCONTINUED | OUTPATIENT
Start: 2024-08-12 | End: 2024-08-13 | Stop reason: HOSPADM

## 2024-08-12 RX ORDER — LIDOCAINE HYDROCHLORIDE 20 MG/ML
INJECTION, SOLUTION EPIDURAL; INFILTRATION; INTRACAUDAL; PERINEURAL
Status: DISCONTINUED | OUTPATIENT
Start: 2024-08-12 | End: 2024-08-12

## 2024-08-12 RX ORDER — SODIUM CHLORIDE 0.9 % (FLUSH) 0.9 %
10 SYRINGE (ML) INJECTION EVERY 6 HOURS PRN
Status: DISCONTINUED | OUTPATIENT
Start: 2024-08-12 | End: 2024-08-13 | Stop reason: HOSPADM

## 2024-08-12 RX ADMIN — PROPOFOL 50 MG: 10 INJECTION, EMULSION INTRAVENOUS at 03:08

## 2024-08-12 RX ADMIN — PROPOFOL 15 MG: 10 INJECTION, EMULSION INTRAVENOUS at 03:08

## 2024-08-12 RX ADMIN — LIDOCAINE HYDROCHLORIDE 50 MG: 20 INJECTION, SOLUTION INTRAVENOUS at 03:08

## 2024-08-12 RX ADMIN — PROPOFOL 75 MG: 10 INJECTION, EMULSION INTRAVENOUS at 03:08

## 2024-08-12 RX ADMIN — PROPOFOL 30 MG: 10 INJECTION, EMULSION INTRAVENOUS at 03:08

## 2024-08-12 RX ADMIN — SODIUM CHLORIDE: 9 INJECTION, SOLUTION INTRAVENOUS at 02:08

## 2024-08-12 NOTE — H&P
History & Physical - Short Stay  Gastroenterology      SUBJECTIVE:     Procedure: Colonoscopy    Chief Complaint/Indication for Procedure: History of rectal cancer s/p resection; first surveillance colonoscopy    (Not in a hospital admission)      Review of patient's allergies indicates:   Allergen Reactions    Asa [aspirin] Anaphylaxis    Penicillins Anaphylaxis    Isovue-m Itching        Past Medical History:   Diagnosis Date    Colorectal cancer 9/22/2023    Duodenitis 6/10/2023     Past Surgical History:   Procedure Laterality Date    DIGITAL RECTAL EXAMINATION UNDER ANESTHESIA N/A 8/30/2023    Procedure: EXAM UNDER ANESTHESIA, DIGITAL, RECTUM;  Surgeon: Joce العلي MD;  Location: [facility];  Service: General;  Laterality: N/A;    PROCTOSCOPY N/A 8/30/2023    Procedure: PROCTOSCOPY;  Surgeon: Joce العلي MD;  Location: [facility];  Service: General;  Laterality: N/A;    RESECTION, RECTUM, LOW ANTERIOR N/A 8/30/2023    Procedure: RESECTION, RECTUM, LOW ANTERIOR;  Surgeon: Joce العلي MD;  Location: [facility];  Service: General;  Laterality: N/A;     Family History   Problem Relation Name Age of Onset    Cancer Mother      Cancer Father      Cancer Maternal Grandmother       Social History     Tobacco Use    Smoking status: Every Day     Current packs/day: 1.00     Average packs/day: 1 pack/day for 51.0 years (51.0 ttl pk-yrs)     Types: Cigarettes     Start date: 7/31/1973    Smokeless tobacco: Never   Substance Use Topics    Alcohol use: Yes     Alcohol/week: 2.0 standard drinks of alcohol     Types: 2 Cans of beer per week     Comment: daily    Drug use: Never         OBJECTIVE:     Vital Signs (Most Recent)  Temp: 98 °F (36.7 °C) (08/12/24 1350)  Pulse: 61 (08/12/24 1350)  Resp: 11 (08/12/24 1350)  BP: (!) 161/71 (08/12/24 1350)  SpO2: 99 % (08/12/24 1350)    Physical Exam:                                                       GENERAL:  Comfortable, in no acute distress.                                  HEENT EXAM:  Nonicteric.  No adenopathy.  Oropharynx is clear.               NECK:  Supple.                                                               LUNGS:  Clear.                                                               CARDIAC:  Regular rate and rhythm.  S1, S2.  No murmur.                      ABDOMEN:  Soft, positive bowel sounds, nontender.  No hepatosplenomegaly or masses.  No rebound or guarding.                                             EXTREMITIES:  No edema.     MENTAL STATUS:  Normal, alert and oriented.      ASSESSMENT/PLAN:     Assessment: History of rectal cancer s/p resection; first surveillance colonoscopy    Plan: Colonoscopy
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
## 2024-08-12 NOTE — DISCHARGE INSTRUCTIONS
Procedure Date  8/12/24     Impression  Overall Impression:   The terminal ileum, cecum, ascending colon, transverse colon, descending colon and sigmoid colon appeared normal.  Anastomosis in the rectum  Performed forceps biopsies in the rectum     Recommendation  - History of colon cancer s/p resection 2023 here for first surveillance colonoscopy; recommend repeat in 3 years for surveillance  - Discharge patient to home  - Advance diet as tolerated  - Continue present medications  - Await pathology results  - Patient has a contact number available for emergencies. The signs and symptoms of potential delayed complications were discussed with the patient. Return to normal activities tomorrow. Written discharge instructions were provided to the patient.     Indication  Colorectal cancer, Generalized abdominal pain

## 2024-08-12 NOTE — TRANSFER OF CARE
"Anesthesia Transfer of Care Note    Patient: Bipin Bassett    Procedure(s) Performed: * No procedures listed *    Patient location: GI    Anesthesia Type: MAC    Transport from OR: Transported from OR on room air with adequate spontaneous ventilation    Post pain: adequate analgesia    Post assessment: no apparent anesthetic complications    Post vital signs: stable    Level of consciousness: responds to stimulation    Nausea/Vomiting: no nausea/vomiting    Complications: none    Transfer of care protocol was followed      Last vitals: Visit Vitals  BP (!) 139/57   Pulse 63   Temp 36.8 °C (98.2 °F) (Oral)   Resp 16   Ht 5' 5" (1.651 m)   Wt 40.8 kg (90 lb)   LMP  (LMP Unknown)   SpO2 100%   Breastfeeding No   BMI 14.98 kg/m²     "

## 2024-08-13 NOTE — ANESTHESIA POSTPROCEDURE EVALUATION
Anesthesia Post Evaluation    Patient: Bipin Bassett    Procedure(s) Performed: * No procedures listed *    Final Anesthesia Type: MAC      Patient location during evaluation: GI PACU  Patient participation: Yes- Able to Participate  Level of consciousness: awake and alert and oriented  Post-procedure vital signs: reviewed and stable  Pain management: adequate  Airway patency: patent    PONV status at discharge: No PONV  Anesthetic complications: no      Cardiovascular status: blood pressure returned to baseline  Respiratory status: unassisted, spontaneous ventilation and room air  Hydration status: euvolemic  Follow-up not needed.  Comments: See nursing note for post op pain/aaron score              Vitals Value Taken Time   /76 08/13/24 0911   Temp 98.0F 08/13/24 1651   Pulse 84 08/13/24 0911   Resp 13 08/13/24 0911   SpO2 98 % 08/13/24 0911   Vitals shown include unfiled device data.      Event Time   Out of Recovery 16:07:09         Pain/Aaron Score: Aaron Score: 8 (8/12/2024  3:39 PM)

## 2024-08-15 ENCOUNTER — TELEPHONE (OUTPATIENT)
Dept: GASTROENTEROLOGY | Facility: CLINIC | Age: 64
End: 2024-08-15
Payer: MEDICARE

## 2024-08-15 NOTE — TELEPHONE ENCOUNTER
Returned call to patient to inquire if she was having signs of a yeast infection. Patient states she was not, but it was on her medication list and she just wanted to make sure that it wasn't something that she needed to be taking. Informed patient that that was a one time dose to treat yeast or prevent yeast if we had given her antibiotics in the past. Patient verbalized understanding.            ----- Message from Mili Cline sent at 8/15/2024 12:51 PM CDT -----  She said Kalpana prescribed that Diflucan and would she be able to when she gets back, said she doesn't have a PCM... uses [facility] [place] [facility] now

## 2024-08-21 ENCOUNTER — TELEPHONE (OUTPATIENT)
Dept: GASTROENTEROLOGY | Facility: CLINIC | Age: 64
End: 2024-08-21
Payer: MEDICARE

## 2024-08-21 NOTE — TELEPHONE ENCOUNTER
Attempted to call results. Left message.              ----- Message from Giuseppe Woodward MD sent at 8/20/2024  7:59 PM CDT -----  Biopsies from colonoscopy are negative for abnormality. Recommend repeat in 3 years for surveillance due to history of colon cancer. Please place reminder in system.

## 2024-08-22 ENCOUNTER — TELEPHONE (OUTPATIENT)
Dept: GASTROENTEROLOGY | Facility: CLINIC | Age: 64
End: 2024-08-22
Payer: MEDICARE

## 2024-08-22 NOTE — TELEPHONE ENCOUNTER
Attempted to call results. Left message. 3 year recall placed on chart.            ----- Message from Giuseppe Woodward MD sent at 8/20/2024  7:59 PM CDT -----  Biopsies from colonoscopy are negative for abnormality. Recommend repeat in 3 years for surveillance due to history of colon cancer. Please place reminder in system.

## 2024-08-23 ENCOUNTER — TELEPHONE (OUTPATIENT)
Dept: GASTROENTEROLOGY | Facility: CLINIC | Age: 64
End: 2024-08-23
Payer: MEDICARE

## 2024-08-23 NOTE — TELEPHONE ENCOUNTER
Results and recommendations called to patient. Verbalized understanding.          ----- Message from Giuseppe Woodward MD sent at 8/20/2024  7:59 PM CDT -----  Biopsies from colonoscopy are negative for abnormality. Recommend repeat in 3 years for surveillance due to history of colon cancer. Please place reminder in system.

## 2024-12-08 ENCOUNTER — HOSPITAL ENCOUNTER (EMERGENCY)
Facility: HOSPITAL | Age: 64
Discharge: HOME | End: 2024-12-08
Payer: MEDICARE

## 2024-12-08 VITALS
HEIGHT: 65 IN | BODY MASS INDEX: 14.99 KG/M2 | DIASTOLIC BLOOD PRESSURE: 79 MMHG | SYSTOLIC BLOOD PRESSURE: 150 MMHG | RESPIRATION RATE: 16 BRPM | WEIGHT: 90 LBS | HEART RATE: 65 BPM | OXYGEN SATURATION: 100 % | TEMPERATURE: 98 F

## 2024-12-08 DIAGNOSIS — R51.9 ACUTE NONINTRACTABLE HEADACHE, UNSPECIFIED HEADACHE TYPE: ICD-10-CM

## 2024-12-08 DIAGNOSIS — R42 DIZZY: ICD-10-CM

## 2024-12-08 DIAGNOSIS — R42 VERTIGO: Primary | ICD-10-CM

## 2024-12-08 LAB
ANION GAP SERPL CALCULATED.3IONS-SCNC: 12 MMOL/L (ref 7–16)
BASOPHILS # BLD AUTO: 0.07 K/UL (ref 0–0.2)
BASOPHILS NFR BLD AUTO: 1.3 % (ref 0–1)
BUN SERPL-MCNC: 5 MG/DL (ref 10–20)
BUN/CREAT SERPL: 8 (ref 6–20)
CALCIUM SERPL-MCNC: 8.9 MG/DL (ref 8.4–10.2)
CHLORIDE SERPL-SCNC: 103 MMOL/L (ref 98–107)
CO2 SERPL-SCNC: 25 MMOL/L (ref 23–31)
CREAT SERPL-MCNC: 0.63 MG/DL (ref 0.55–1.02)
DIFFERENTIAL METHOD BLD: ABNORMAL
EGFR (NO RACE VARIABLE) (RUSH/TITUS): 98 ML/MIN/1.73M2
EOSINOPHIL # BLD AUTO: 0.19 K/UL (ref 0–0.5)
EOSINOPHIL NFR BLD AUTO: 3.6 % (ref 1–4)
ERYTHROCYTE [DISTWIDTH] IN BLOOD BY AUTOMATED COUNT: 14.5 % (ref 11.5–14.5)
GLUCOSE SERPL-MCNC: 74 MG/DL (ref 82–115)
GLUCOSE SERPL-MCNC: 76 MG/DL (ref 70–105)
HCT VFR BLD AUTO: 36.6 % (ref 38–47)
HGB BLD-MCNC: 12 G/DL (ref 12–16)
IMM GRANULOCYTES # BLD AUTO: 0.02 K/UL (ref 0–0.04)
IMM GRANULOCYTES NFR BLD: 0.4 % (ref 0–0.4)
LYMPHOCYTES # BLD AUTO: 1.52 K/UL (ref 1–4.8)
LYMPHOCYTES NFR BLD AUTO: 28.7 % (ref 27–41)
MAGNESIUM SERPL-MCNC: 1.9 MG/DL (ref 1.6–2.6)
MCH RBC QN AUTO: 32.7 PG (ref 27–31)
MCHC RBC AUTO-ENTMCNC: 32.8 G/DL (ref 32–36)
MCV RBC AUTO: 99.7 FL (ref 80–96)
MONOCYTES # BLD AUTO: 0.48 K/UL (ref 0–0.8)
MONOCYTES NFR BLD AUTO: 9.1 % (ref 2–6)
MPC BLD CALC-MCNC: 10.2 FL (ref 9.4–12.4)
NEUTROPHILS # BLD AUTO: 3.01 K/UL (ref 1.8–7.7)
NEUTROPHILS NFR BLD AUTO: 56.9 % (ref 53–65)
NRBC # BLD AUTO: 0 X10E3/UL
NRBC, AUTO (.00): 0 %
PLATELET # BLD AUTO: 230 K/UL (ref 150–400)
POTASSIUM SERPL-SCNC: 3.9 MMOL/L (ref 3.5–5.1)
RBC # BLD AUTO: 3.67 M/UL (ref 4.2–5.4)
SODIUM SERPL-SCNC: 136 MMOL/L (ref 136–145)
TROPONIN I SERPL HS-MCNC: <2.7 NG/L
WBC # BLD AUTO: 5.29 K/UL (ref 4.5–11)

## 2024-12-08 PROCEDURE — 96372 THER/PROPH/DIAG INJ SC/IM: CPT | Performed by: NURSE PRACTITIONER

## 2024-12-08 PROCEDURE — 80048 BASIC METABOLIC PNL TOTAL CA: CPT | Performed by: FAMILY MEDICINE

## 2024-12-08 PROCEDURE — 85025 COMPLETE CBC W/AUTO DIFF WBC: CPT | Performed by: FAMILY MEDICINE

## 2024-12-08 PROCEDURE — 63600175 PHARM REV CODE 636 W HCPCS: Performed by: NURSE PRACTITIONER

## 2024-12-08 PROCEDURE — 84484 ASSAY OF TROPONIN QUANT: CPT | Performed by: FAMILY MEDICINE

## 2024-12-08 PROCEDURE — 36415 COLL VENOUS BLD VENIPUNCTURE: CPT | Performed by: FAMILY MEDICINE

## 2024-12-08 PROCEDURE — 25000003 PHARM REV CODE 250: Performed by: NURSE PRACTITIONER

## 2024-12-08 PROCEDURE — 83735 ASSAY OF MAGNESIUM: CPT | Performed by: FAMILY MEDICINE

## 2024-12-08 PROCEDURE — 99284 EMERGENCY DEPT VISIT MOD MDM: CPT | Mod: 25

## 2024-12-08 PROCEDURE — 82962 GLUCOSE BLOOD TEST: CPT

## 2024-12-08 RX ORDER — HYDROXYZINE PAMOATE 25 MG/1
50 CAPSULE ORAL
Status: COMPLETED | OUTPATIENT
Start: 2024-12-08 | End: 2024-12-08

## 2024-12-08 RX ORDER — HYDROXYZINE PAMOATE 25 MG/1
50 CAPSULE ORAL EVERY 8 HOURS PRN
Qty: 40 CAPSULE | Refills: 1 | Status: SHIPPED | OUTPATIENT
Start: 2024-12-08 | End: 2025-01-17

## 2024-12-08 RX ORDER — PROMETHAZINE HYDROCHLORIDE 25 MG/ML
25 INJECTION, SOLUTION INTRAMUSCULAR; INTRAVENOUS
Status: COMPLETED | OUTPATIENT
Start: 2024-12-08 | End: 2024-12-08

## 2024-12-08 RX ORDER — MECLIZINE HYDROCHLORIDE 25 MG/1
25 TABLET ORAL 3 TIMES DAILY PRN
Qty: 15 TABLET | Refills: 0 | Status: SHIPPED | OUTPATIENT
Start: 2024-12-08

## 2024-12-08 RX ORDER — KETOROLAC TROMETHAMINE 30 MG/ML
30 INJECTION, SOLUTION INTRAMUSCULAR; INTRAVENOUS
Status: COMPLETED | OUTPATIENT
Start: 2024-12-08 | End: 2024-12-08

## 2024-12-08 RX ADMIN — KETOROLAC TROMETHAMINE 30 MG: 30 INJECTION, SOLUTION INTRAMUSCULAR at 08:12

## 2024-12-08 RX ADMIN — HYDROXYZINE PAMOATE 50 MG: 25 CAPSULE ORAL at 09:12

## 2024-12-08 RX ADMIN — PROMETHAZINE HYDROCHLORIDE 25 MG: 25 INJECTION INTRAMUSCULAR; INTRAVENOUS at 08:12

## 2024-12-08 NOTE — ED TRIAGE NOTES
Bipin Bassett, a 64 y.o. female presents to [facility] via POV with a chief complaint of dizziness  on waking up this am and ambulating. Describes as room is spinning. Worse when laying ot sitting position with nausea. No PMH       Triage Note:  Chief Complaint   Patient presents with    Dizziness     No, Primary Doctor  Review of patient's allergies indicates:   Allergen Reactions    Asa [aspirin] Anaphylaxis    Penicillins Anaphylaxis    Isovue-m Itching     Past Medical History:   Diagnosis Date    Colorectal cancer 9/22/2023    Duodenitis 6/10/2023     Past Surgical History:   Procedure Laterality Date    DIGITAL RECTAL EXAMINATION UNDER ANESTHESIA N/A 8/30/2023    Procedure: EXAM UNDER ANESTHESIA, DIGITAL, RECTUM;  Surgeon: Joce العلي MD;  Location: [facility] OR;  Service: General;  Laterality: N/A;    PROCTOSCOPY N/A 8/30/2023    Procedure: PROCTOSCOPY;  Surgeon: Joce العلي MD;  Location: [facility] OR;  Service: General;  Laterality: N/A;    RESECTION, RECTUM, LOW ANTERIOR N/A 8/30/2023    Procedure: RESECTION, RECTUM, LOW ANTERIOR;  Surgeon: Joce العلي MD;  Location: [facility] OR;  Service: General;  Laterality: N/A;     Social History     Tobacco Use    Smoking status: Every Day     Current packs/day: 1.00     Average packs/day: 1 pack/day for 51.4 years (51.4 ttl pk-yrs)     Types: Cigarettes     Start date: 7/31/1973    Smokeless tobacco: Never   Substance Use Topics    Alcohol use: Yes     Alcohol/week: 2.0 standard drinks of alcohol     Types: 2 Cans of beer per week     Comment: daily    Drug use: Never     Past Surgical History:   Procedure Laterality Date    DIGITAL RECTAL EXAMINATION UNDER ANESTHESIA N/A 8/30/2023    Procedure: EXAM UNDER ANESTHESIA, DIGITAL, RECTUM;  Surgeon: Joce العلي MD;  Location: [facility] OR;  Service: General;  Laterality: N/A;    PROCTOSCOPY N/A 8/30/2023    Procedure: PROCTOSCOPY;  Surgeon: Joce العلي MD;  Location: [facility]  OR;  Service: General;  Laterality: N/A;    RESECTION, RECTUM, LOW ANTERIOR N/A 8/30/2023    Procedure: RESECTION, RECTUM, LOW ANTERIOR;  Surgeon: Joce العلي MD;  Location: [facility] OR;  Service: General;  Laterality: N/A;     Vitals:    12/08/24 0709   BP:    Pulse:    Resp: 16   Temp:      No current facility-administered medications for this encounter.     No current outpatient medications on file.

## 2024-12-08 NOTE — Clinical Note
"Bipin Kellertona Bassett was seen and treated in our emergency department on 12/8/2024.  She may return to work on 12/10/2024.       If you have any questions or concerns, please don't hesitate to call.      JERO EMN, FNP    "

## 2024-12-08 NOTE — ED PROVIDER NOTES
Encounter Date: 12/8/2024       History     Chief Complaint   Patient presents with    Dizziness     65y/o female presents with c/o constant headache with vertigo, nausea and vomiting that started around 0530 this morning while getting ready to go to work at her job at [employer]. Vertigo is constant without positional component. Symptoms improve when she closes her eyes.  States vomited several times with clear phlegm. Denies change in  vision, hearing, speech.  Denies weakness in extremities.  Given otc medication for nausea by her sister prior to arrival.  Has not had similar symptoms in the past.  Has remote hx of colon cancer with previous colon resection by Dr. Joce العلي in 2023..  Follows with Dr. Savage.  Had c-scope on 8/20/24 that was negative.        Review of patient's allergies indicates:   Allergen Reactions    Asa [aspirin] Anaphylaxis    Penicillins Anaphylaxis    Isovue-m Itching     Past Medical History:   Diagnosis Date    Colorectal cancer 9/22/2023    Duodenitis 6/10/2023     Past Surgical History:   Procedure Laterality Date    DIGITAL RECTAL EXAMINATION UNDER ANESTHESIA N/A 8/30/2023    Procedure: EXAM UNDER ANESTHESIA, DIGITAL, RECTUM;  Surgeon: Joce العلي MD;  Location: [facility];  Service: General;  Laterality: N/A;    PROCTOSCOPY N/A 8/30/2023    Procedure: PROCTOSCOPY;  Surgeon: Joce العلي MD;  Location: [facility] OR;  Service: General;  Laterality: N/A;    RESECTION, RECTUM, LOW ANTERIOR N/A 8/30/2023    Procedure: RESECTION, RECTUM, LOW ANTERIOR;  Surgeon: Joce العلي MD;  Location: [facility] OR;  Service: General;  Laterality: N/A;     Family History   Problem Relation Name Age of Onset    Cancer Mother      Cancer Father      Cancer Maternal Grandmother       Social History     Tobacco Use    Smoking status: Every Day     Current packs/day: 1.00     Average packs/day: 1 pack/day for 51.4 years (51.4 ttl pk-yrs)     Types: Cigarettes     Start date: 7/31/1973     Smokeless tobacco: Never   Substance Use Topics    Alcohol use: Yes     Alcohol/week: 2.0 standard drinks of alcohol     Types: 2 Cans of beer per week     Comment: daily    Drug use: Never     Review of Systems   Constitutional:  Negative for fever.   HENT:  Negative for congestion, ear pain, hearing loss and sinus pain.    Eyes:  Negative for visual disturbance.   Respiratory:  Negative for chest tightness.    Cardiovascular:  Negative for chest pain.   Gastrointestinal:  Positive for nausea and vomiting. Negative for diarrhea.   Neurological:  Positive for dizziness and headaches. Negative for speech difficulty, weakness and numbness.        See HPI       Physical Exam     Initial Vitals   BP Pulse Resp Temp SpO2   12/08/24 0703 12/08/24 0703 12/08/24 0709 12/08/24 0703 12/08/24 0703   (!) 167/77 70 16 97.7 °F (36.5 °C) 100 %      MAP       --                Physical Exam    Nursing note and vitals reviewed.  Constitutional: She appears well-developed and well-nourished. No distress.   HENT:   Head: Normocephalic and atraumatic.   Right Ear: External ear normal.   Left Ear: External ear normal.   Nose: Nose normal. Mouth/Throat: Oropharynx is clear and moist.   Eyes: Conjunctivae and EOM are normal. Pupils are equal, round, and reactive to light.   Neck: Neck supple.   Normal range of motion.  Cardiovascular:  Normal rate, regular rhythm and normal heart sounds.           Pulmonary/Chest: Breath sounds normal.   Abdominal: Abdomen is soft. There is no abdominal tenderness.   Musculoskeletal:         General: No tenderness or edema. Normal range of motion.      Cervical back: Normal range of motion and neck supple.     Neurological: She is alert and oriented to person, place, and time. She has normal strength. No cranial nerve deficit. GCS score is 15. GCS eye subscore is 4. GCS verbal subscore is 5. GCS motor subscore is 6.   EOM's intact.  Finger to nose without past pointing.   Skin: Skin is warm and dry.    Psychiatric: She has a normal mood and affect. Thought content normal.         Medical Screening Exam   See Full Note    ED Course   Procedures  Labs Reviewed   BASIC METABOLIC PANEL - Abnormal       Result Value    Sodium 136      Potassium 3.9      Chloride 103      CO2 25      Anion Gap 12      Glucose 74 (*)     BUN 5 (*)     Creatinine 0.63      BUN/Creatinine Ratio 8      Calcium 8.9      eGFR 98     CBC WITH DIFFERENTIAL - Abnormal    WBC 5.29      RBC 3.67 (*)     Hemoglobin 12.0      Hematocrit 36.6 (*)     MCV 99.7 (*)     MCH 32.7 (*)     MCHC 32.8      RDW 14.5      Platelet Count 230      MPV 10.2      Neutrophils % 56.9      Lymphocytes % 28.7      Monocytes % 9.1 (*)     Eosinophils % 3.6      Basophils % 1.3 (*)     Immature Granulocytes % 0.4      nRBC, Auto 0.0      Neutrophils, Abs 3.01      Lymphocytes, Absolute 1.52      Monocytes, Absolute 0.48      Eosinophils, Absolute 0.19      Basophils, Absolute 0.07      Immature Granulocytes, Absolute 0.02      nRBC, Absolute 0.00      Diff Type Auto     TROPONIN I - Normal    Troponin I High Sensitivity <2.7     MAGNESIUM - Normal    Magnesium 1.9     CBC W/ AUTO DIFFERENTIAL    Narrative:     The following orders were created for panel order CBC auto differential.  Procedure                               Abnormality         Status                     ---------                               -----------         ------                     CBC with Differential[[id number]]       Abnormal            Final result                 Please view results for these tests on the individual orders.   POCT GLUCOSE MONITORING CONTINUOUS    POC Glucose 76            Imaging Results              CT Head Without Contrast (Final result)  Result time 12/08/24 08:03:18      Final result by Lonny Ramos MD (12/08/24 08:03:18)                   Impression:      1. No acute intracranial CT findings.      Electronically signed by: Lonny  Stephanierosa  Date:    12/08/2024  Time:    08:03               Narrative:    EXAMINATION:  CT HEAD WITHOUT CONTRAST    CLINICAL HISTORY:  Dizziness, persistent/recurrent, cardiac or vascular cause suspected;    TECHNIQUE:  Low dose axial CT images obtained throughout the head without intravenous contrast. Sagittal and coronal reconstructions were performed.    COMPARISON:  Head CT 01/03/2024.    FINDINGS:  Brain: There is no evidence of a mass, edema, midline shift, or intracranial hemorrhage. No extra-axial fluid collection.  Grossly stable findings of age-related chronic small vessel ischemic and involutional changes.  No CT evidence of an acute major vascular territorial infarct.    Ventricles: The ventricles, sulci, and cisterns are within normal limits.    Skull: The osseous structures are unremarkable in appearance.    Extracranial soft tissues: Limited imaging is within normal limits.    Other: Mild chronic appearing mucosal thickening within the left maxillary sinus.  Mastoid air cells are clear.                                       Medications   promethazine injection 25 mg (25 mg Intramuscular Given 12/8/24 0824)   ketorolac injection 30 mg (30 mg Intramuscular Given 12/8/24 0853)     Medical Decision Making  63y/o female presents with c/o constant headache with vertigo, nausea and vomiting that started around 0530 this morning while getting ready to go to work at her job at [employer]. Vertigo is constant without positional component. Symptoms improve when she closes her eyes.  States vomited several times with clear phlegm. Denies change in  vision, hearing, speech.  Denies weakness in extremities.  Given otc medication for nausea by her sister prior to arrival.  Has not had similar symptoms in the past.  Has remote hx of colon cancer with previous colon resection by Dr. Joce العلي in 2023..  Follows with Dr. Savage.  Had c-scope on 8/20/24 that was negative.      Will give phenergain 25mg IM for  "nausea.  Has .    CT brain negative for acute intracranial pathology.  Mild edema left maxillary sinus.    Labs reviewed and WNL.    Vertigo and headache improved after phenergan and Toradol.  States "feels better and just wants to go home and get in her bed."    Given instructions on making appt with Stephanie Lewis NP to get established for primary care    Rx for Meclizine to use prn.  Advised may cause drowsiness.    CBC WBC 7.31 Hgb 12, Hct 36 Plt 230    Results of labs and imaging discussed with pt.  Vertigo and nausea have improved with phenergan.  Will give Toradol 30mg IM for headache.  Has hx of ASA allergy.   Is able to tolerate Ibuprofen and Aleve.    Pt discussed with Dr. Cantu at 0845    Developed increased vertigo when ambulating in waiting room.  Discussed with Dr. Cantu at 0950.  Will give liter of IVF and Vistaril 50mg PO and re-evaluate.    Pt deferred IVF's.  Given Vistaril.  Dr. Cantu reviewed chart and escribed Vistaril prn.  Discharged home by Dr. Cantu.        Risk  Prescription drug management.                                      Clinical Impression:   Final diagnoses:  [R42] Vertigo (Primary)  [R51.9] Acute nonintractable headache, unspecified headache type        ED Disposition Condition    Discharge Stable          ED Prescriptions    None       Follow-up Information       Follow up With Specialties Details Why Contact Info    Stephanie Lewis FNP Family Medicine, Emergency Medicine Schedule an appointment as soon as possible for a visit in 1 day  [address] [place] [address]  [phone]               Jazmyne Cano FNP  12/08/24 0944       Jazmyne Cano FNP  12/08/24 1043    "

## 2024-12-08 NOTE — Clinical Note
"Bipin Kellertona Bassett was seen and treated in our emergency department on 12/8/2024.  She may return to work on 12/10/2024.       If you have any questions or concerns, please don't hesitate to call.      JERO EMN, DO    "

## 2024-12-08 NOTE — ED NOTES
WHILE WALKING TO LOBBY DURING DISCHARGE- PT STILL APPEARS VERY DIZZY AND UNSTEADY. RN NOTIFIED FNP AND BROUGHT PATIENT BACK TO ROOM.

## 2024-12-08 NOTE — Clinical Note
"Bipin Kellertona Bassett was seen and treated in our emergency department on 12/8/2024.  She may return to work on 12/10/2024.       If you have any questions or concerns, please don't hesitate to call.      JERO Saldaña BSN, RN    "

## 2024-12-08 NOTE — DISCHARGE INSTRUCTIONS
Slow position changes.  Advised to call for appt with Stephanie Lewis NP on 12/9/24 to get established for pcp for continuing care.  Return to ER for worsening symptoms or any problems.

## 2024-12-09 ENCOUNTER — TELEPHONE (OUTPATIENT)
Dept: EMERGENCY MEDICINE | Facility: HOSPITAL | Age: 64
End: 2024-12-09
Payer: MEDICARE

## 2025-01-30 ENCOUNTER — HOSPITAL ENCOUNTER (EMERGENCY)
Facility: HOSPITAL | Age: 65
Discharge: HOME | End: 2025-01-31
Attending: FAMILY MEDICINE
Payer: MEDICARE

## 2025-01-30 VITALS
RESPIRATION RATE: 20 BRPM | OXYGEN SATURATION: 96 % | DIASTOLIC BLOOD PRESSURE: 62 MMHG | TEMPERATURE: 98 F | BODY MASS INDEX: 14.41 KG/M2 | SYSTOLIC BLOOD PRESSURE: 113 MMHG | HEIGHT: 65 IN | WEIGHT: 86.5 LBS | HEART RATE: 77 BPM

## 2025-01-30 DIAGNOSIS — B35.1 TOENAIL FUNGUS: Primary | ICD-10-CM

## 2025-01-30 DIAGNOSIS — B07.0 PLANTAR WART: ICD-10-CM

## 2025-01-30 DIAGNOSIS — R11.0 NAUSEA: ICD-10-CM

## 2025-01-30 DIAGNOSIS — B35.3 TINEA PEDIS OF BOTH FEET: ICD-10-CM

## 2025-01-30 PROCEDURE — 99283 EMERGENCY DEPT VISIT LOW MDM: CPT

## 2025-01-30 NOTE — Clinical Note
"Bipin"Serge Bassett was seen and treated in our emergency department on 1/30/2025.  She may return to work on 03/06/2025.       If you have any questions or concerns, please don't hesitate to call.      Peter Cantu, DO"

## 2025-01-30 NOTE — Clinical Note
"Bipin"Serge Bassett was seen and treated in our emergency department on 1/30/2025.  She may return to work on 02/06/2025.       If you have any questions or concerns, please don't hesitate to call.      Peter Cantu, DO"

## 2025-01-31 LAB
ALBUMIN SERPL BCP-MCNC: 3.7 G/DL (ref 3.4–4.8)
ALBUMIN/GLOB SERPL: 1.1 {RATIO}
ALP SERPL-CCNC: 69 U/L (ref 40–150)
ALT SERPL W P-5'-P-CCNC: 14 U/L
ANION GAP SERPL CALCULATED.3IONS-SCNC: 16 MMOL/L (ref 7–16)
AST SERPL W P-5'-P-CCNC: 44 U/L (ref 5–34)
BASOPHILS # BLD AUTO: 0.04 K/UL (ref 0–0.2)
BASOPHILS NFR BLD AUTO: 0.8 % (ref 0–1)
BILIRUB SERPL-MCNC: 0.3 MG/DL
BUN SERPL-MCNC: 7 MG/DL (ref 10–20)
BUN/CREAT SERPL: 10 (ref 6–20)
CALCIUM SERPL-MCNC: 9.1 MG/DL (ref 8.4–10.2)
CHLORIDE SERPL-SCNC: 99 MMOL/L (ref 98–107)
CO2 SERPL-SCNC: 20 MMOL/L (ref 23–31)
CREAT SERPL-MCNC: 0.67 MG/DL (ref 0.55–1.02)
DIFFERENTIAL METHOD BLD: ABNORMAL
EGFR (NO RACE VARIABLE) (RUSH/TITUS): 97 ML/MIN/1.73M2
EOSINOPHIL # BLD AUTO: 0.18 K/UL (ref 0–0.5)
EOSINOPHIL NFR BLD AUTO: 3.6 % (ref 1–4)
ERYTHROCYTE [DISTWIDTH] IN BLOOD BY AUTOMATED COUNT: 13.2 % (ref 11.5–14.5)
GLOBULIN SER-MCNC: 3.4 G/DL (ref 2–4)
GLUCOSE SERPL-MCNC: 94 MG/DL (ref 82–115)
HCT VFR BLD AUTO: 34.3 % (ref 38–47)
HGB BLD-MCNC: 11.6 G/DL (ref 12–16)
IMM GRANULOCYTES # BLD AUTO: 0 K/UL (ref 0–0.04)
IMM GRANULOCYTES NFR BLD: 0 % (ref 0–0.4)
LYMPHOCYTES # BLD AUTO: 2.7 K/UL (ref 1–4.8)
LYMPHOCYTES NFR BLD AUTO: 54.1 % (ref 27–41)
MCH RBC QN AUTO: 33.6 PG (ref 27–31)
MCHC RBC AUTO-ENTMCNC: 33.8 G/DL (ref 32–36)
MCV RBC AUTO: 99.4 FL (ref 80–96)
MONOCYTES # BLD AUTO: 0.79 K/UL (ref 0–0.8)
MONOCYTES NFR BLD AUTO: 15.8 % (ref 2–6)
MPC BLD CALC-MCNC: 10.8 FL (ref 9.4–12.4)
NEUTROPHILS # BLD AUTO: 1.28 K/UL (ref 1.8–7.7)
NEUTROPHILS NFR BLD AUTO: 25.7 % (ref 53–65)
NRBC # BLD AUTO: 0 X10E3/UL
NRBC, AUTO (.00): 0 %
PLATELET # BLD AUTO: 168 K/UL (ref 150–400)
POTASSIUM SERPL-SCNC: 3 MMOL/L (ref 3.5–5.1)
PROT SERPL-MCNC: 7.1 G/DL (ref 5.8–7.6)
RBC # BLD AUTO: 3.45 M/UL (ref 4.2–5.4)
SODIUM SERPL-SCNC: 132 MMOL/L (ref 136–145)
WBC # BLD AUTO: 4.99 K/UL (ref 4.5–11)

## 2025-01-31 PROCEDURE — 36415 COLL VENOUS BLD VENIPUNCTURE: CPT | Performed by: FAMILY MEDICINE

## 2025-01-31 PROCEDURE — 80053 COMPREHEN METABOLIC PANEL: CPT | Performed by: FAMILY MEDICINE

## 2025-01-31 PROCEDURE — 85025 COMPLETE CBC W/AUTO DIFF WBC: CPT | Performed by: FAMILY MEDICINE

## 2025-02-05 NOTE — ED PROVIDER NOTES
Encounter Date: 1/30/2025       History     Chief Complaint   Patient presents with    Nausea    Vomiting    Diarrhea    Dizziness     Mr. Bipin Bassett comes in with nausea vomiting and diarrhea with dizziness        Review of patient's allergies indicates:   Allergen Reactions    Asa [aspirin] Anaphylaxis    Penicillins Anaphylaxis    Isovue-m Itching     Past Medical History:   Diagnosis Date    Colorectal cancer 9/22/2023    Duodenitis 6/10/2023     Past Surgical History:   Procedure Laterality Date    DIGITAL RECTAL EXAMINATION UNDER ANESTHESIA N/A 8/30/2023    Procedure: EXAM UNDER ANESTHESIA, DIGITAL, RECTUM;  Surgeon: Joce العلي MD;  Location: [facility];  Service: General;  Laterality: N/A;    PROCTOSCOPY N/A 8/30/2023    Procedure: PROCTOSCOPY;  Surgeon: Joce العلي MD;  Location: [facility];  Service: General;  Laterality: N/A;    RESECTION, RECTUM, LOW ANTERIOR N/A 8/30/2023    Procedure: RESECTION, RECTUM, LOW ANTERIOR;  Surgeon: Joce العلي MD;  Location: [facility];  Service: General;  Laterality: N/A;     Family History   Problem Relation Name Age of Onset    Cancer Mother      Cancer Father      Cancer Maternal Grandmother       Social History     Tobacco Use    Smoking status: Every Day     Current packs/day: 1.00     Average packs/day: 1 pack/day for 51.5 years (51.5 ttl pk-yrs)     Types: Cigarettes     Start date: 7/31/1973    Smokeless tobacco: Never   Substance Use Topics    Alcohol use: Yes     Alcohol/week: 2.0 standard drinks of alcohol     Types: 2 Cans of beer per week     Comment: daily    Drug use: Never     Review of Systems   Constitutional: Negative.  Negative for fever.   HENT: Negative.  Negative for sore throat.    Eyes: Negative.    Respiratory: Negative.  Negative for shortness of breath.    Cardiovascular: Negative.  Negative for chest pain.   Gastrointestinal:  Positive for nausea and vomiting.   Endocrine: Negative.    Genitourinary: Negative.  Negative for  dysuria.   Musculoskeletal: Negative.  Negative for back pain.   Skin: Negative.  Negative for rash.   Allergic/Immunologic: Negative.    Neurological:  Positive for dizziness. Negative for weakness.   Hematological: Negative.  Does not bruise/bleed easily.   Psychiatric/Behavioral: Negative.     All other systems reviewed and are negative.      Physical Exam     Initial Vitals [01/30/25 2345]   BP Pulse Resp Temp SpO2   113/62 77 20 97.7 °F (36.5 °C) 96 %      MAP       --         Physical Exam    Constitutional: She appears well-developed and well-nourished.   HENT:   Head: Normocephalic and atraumatic.   Right Ear: External ear normal.   Left Ear: External ear normal.   Nose: Nose normal. Mouth/Throat: Oropharynx is clear and moist.   Eyes: Conjunctivae and EOM are normal. Pupils are equal, round, and reactive to light.   Neck: Neck supple.   Normal range of motion.  Cardiovascular:  Normal rate, regular rhythm, normal heart sounds and intact distal pulses.           Pulmonary/Chest: Breath sounds normal.   Abdominal: Abdomen is soft. Bowel sounds are normal.   Genitourinary:    Vagina and uterus normal.     Musculoskeletal:         General: Normal range of motion.      Cervical back: Normal range of motion and neck supple.     Neurological: She is alert and oriented to person, place, and time. She has normal strength and normal reflexes.   Skin: Skin is warm. Capillary refill takes less than 2 seconds.   Psychiatric: She has a normal mood and affect. Her behavior is normal. Judgment and thought content normal.         Medical Screening Exam   See Full Note    ED Course   Procedures  Labs Reviewed   COMPREHENSIVE METABOLIC PANEL - Abnormal       Result Value    Sodium 132 (*)     Potassium 3.0 (*)     Chloride 99      CO2 20 (*)     Anion Gap 16      Glucose 94      BUN 7 (*)     Creatinine 0.67      BUN/Creatinine Ratio 10      Calcium 9.1      Total Protein 7.1      Albumin 3.7      Globulin 3.4      A/G Ratio  1.1      Bilirubin, Total 0.3      Alk Phos 69      ALT 14      AST 44 (*)     eGFR 97     CBC WITH DIFFERENTIAL - Abnormal    WBC 4.99      RBC 3.45 (*)     Hemoglobin 11.6 (*)     Hematocrit 34.3 (*)     MCV 99.4 (*)     MCH 33.6 (*)     MCHC 33.8      RDW 13.2      Platelet Count 168      MPV 10.8      Neutrophils % 25.7 (*)     Lymphocytes % 54.1 (*)     Monocytes % 15.8 (*)     Eosinophils % 3.6      Basophils % 0.8      Immature Granulocytes % 0.0      nRBC, Auto 0.0      Neutrophils, Abs 1.28 (*)     Lymphocytes, Absolute 2.70      Monocytes, Absolute 0.79      Eosinophils, Absolute 0.18      Basophils, Absolute 0.04      Immature Granulocytes, Absolute 0.00      nRBC, Absolute 0.00      Diff Type Auto     CBC W/ AUTO DIFFERENTIAL    Narrative:     The following orders were created for panel order CBC Auto Differential.  Procedure                               Abnormality         Status                     ---------                               -----------         ------                     CBC with Differential[[id number]]       Abnormal            Final result                 Please view results for these tests on the individual orders.          Imaging Results    None          Medications - No data to display  Medical Decision Making  Amount and/or Complexity of Data Reviewed  Labs: ordered.                          Medical Decision Making:   Initial Assessment:   Mr. Bipin Bassett comes in with nausea vomiting and diarrhea with dizziness--and foot problems        Differential Diagnosis:   Tinea pedis             Clinical Impression:   Final diagnoses:  [R11.0] Nausea  [B35.3] Tinea pedis of both feet  [B35.1] Toenail fungus (Primary)  [B07.0] Plantar wart        ED Disposition Condition    Discharge Stable          ED Prescriptions    None       Follow-up Information    None          Peter Cantu,   02/05/25 4922

## 2025-02-13 ENCOUNTER — OFFICE VISIT (OUTPATIENT)
Dept: GASTROENTEROLOGY | Facility: CLINIC | Age: 65
End: 2025-02-13
Payer: MEDICARE

## 2025-02-13 VITALS
OXYGEN SATURATION: 96 % | HEIGHT: 65 IN | HEART RATE: 77 BPM | BODY MASS INDEX: 14.27 KG/M2 | WEIGHT: 85.63 LBS | SYSTOLIC BLOOD PRESSURE: 133 MMHG | DIASTOLIC BLOOD PRESSURE: 71 MMHG

## 2025-02-13 DIAGNOSIS — C19 COLORECTAL CANCER: Primary | ICD-10-CM

## 2025-02-13 PROCEDURE — 99213 OFFICE O/P EST LOW 20 MIN: CPT | Mod: PBBFAC

## 2025-02-13 PROCEDURE — 99999 PR PBB SHADOW E&M-EST. PATIENT-LVL III: CPT | Mod: PBBFAC,,,

## 2025-02-13 PROCEDURE — 3075F SYST BP GE 130 - 139MM HG: CPT | Mod: CPTII,,,

## 2025-02-13 PROCEDURE — 1160F RVW MEDS BY RX/DR IN RCRD: CPT | Mod: CPTII,,,

## 2025-02-13 PROCEDURE — 3078F DIAST BP <80 MM HG: CPT | Mod: CPTII,,,

## 2025-02-13 PROCEDURE — 99214 OFFICE O/P EST MOD 30 MIN: CPT | Mod: S$PBB,,,

## 2025-02-13 PROCEDURE — 3008F BODY MASS INDEX DOCD: CPT | Mod: CPTII,,,

## 2025-02-13 PROCEDURE — 3288F FALL RISK ASSESSMENT DOCD: CPT | Mod: CPTII,,,

## 2025-02-13 PROCEDURE — 1159F MED LIST DOCD IN RCRD: CPT | Mod: CPTII,,,

## 2025-02-13 PROCEDURE — 1101F PT FALLS ASSESS-DOCD LE1/YR: CPT | Mod: CPTII,,,

## 2025-02-13 NOTE — PROGRESS NOTES
"  CC:  History of colon cancer    HPI 64 y.o. AA female presents for follow-up post colonoscopy.  Anastomosis and rectum biopsies performed: Appear normal recommended repeat 3 years for surveillance.  Are Biopsies are negative.  Patient denies any hematochezia, melena or abdominal pain.  Patient denies nausea vomiting or dysphagia.  Patient says she a upcoming appointment with Dr. Savage for follow-up for her history of colorectal cancer and she does have some imaging to follow-up with.  Labs reviewed hemoglobin 11.6 hematocrit 34.3 ALT 14 AST 44.  Interval  HPI 63 y.o. AA female presents today for follow-up colorectal cancer.  Patient is due for a screening colonoscopy post colorectal surgery for cancer in July of 2023.  Patient still sees Dr. Savage and sees him yearly.  Patient does admit to having some abdominal pain around her umbilicus area state that has been going on for few months now.  Patient denies any hematochezia or melena.  Denies any nausea vomiting or dysphagia.  Patient's previous weight was 77 lb and today's weight is 86 lb.  Patient state she is working every day and doing well and eating what she wants to.  Labs reviewed no anemia ALT 22 normal and AST is 38 it is decreased from 64 on previous lab.    Medical records reviewed. Additional history supplemented by nursing.     Past Medical History:   Diagnosis Date    Colorectal cancer 9/22/2023    Duodenitis 6/10/2023         Review of Systems  General ROS: negative for chills, fever or weight loss  Cardiovascular ROS: no chest pain or dyspnea on exertion  Gastrointestinal ROS: no abdominal pain, change in bowel habits, or black/ bloody stools    Physical Examination  /71 (BP Location: Left arm, Patient Position: Sitting)   Pulse 77   Ht 5' 5" (1.651 m)   Wt 38.8 kg (85 lb 9.6 oz)   LMP  (LMP Unknown)   SpO2 96%   BMI 14.24 kg/m²   General appearance: alert, cooperative, no distress  HENT: Normocephalic, atraumatic, neck symmetrical, " no nasal discharge   Lungs: no labored breathing, symmetric chest wall expansion bilaterally  Heart: regular rate and rhythm without rub; no displacement of the PMI   Abdomen: soft, non-tender; bowel sounds normoactive; no organomegaly    Labs:  Lab Results   Component Value Date    WBC 4.99 01/31/2025    HGB 11.6 (L) 01/31/2025    HCT 34.3 (L) 01/31/2025    MCV 99.4 (H) 01/31/2025     01/31/2025       CMP  Sodium   Date Value Ref Range Status   01/31/2025 132 (L) 136 - 145 mmol/L Final     Potassium   Date Value Ref Range Status   01/31/2025 3.0 (L) 3.5 - 5.1 mmol/L Final     Chloride   Date Value Ref Range Status   01/31/2025 99 98 - 107 mmol/L Final     CO2   Date Value Ref Range Status   01/31/2025 20 (L) 23 - 31 mmol/L Final     Glucose   Date Value Ref Range Status   01/31/2025 94 82 - 115 mg/dL Final     BUN   Date Value Ref Range Status   01/31/2025 7 (L) 10 - 20 mg/dL Final     Creatinine   Date Value Ref Range Status   01/31/2025 0.67 0.55 - 1.02 mg/dL Final     Calcium   Date Value Ref Range Status   01/31/2025 9.1 8.4 - 10.2 mg/dL Final     Total Protein   Date Value Ref Range Status   01/31/2025 7.1 5.8 - 7.6 g/dL Final     Albumin   Date Value Ref Range Status   01/31/2025 3.7 3.4 - 4.8 g/dL Final     Bilirubin, Total   Date Value Ref Range Status   01/31/2025 0.3 <=1.5 mg/dL Final     Alk Phos   Date Value Ref Range Status   01/31/2025 69 40 - 150 U/L Final     AST   Date Value Ref Range Status   01/31/2025 44 (H) 5 - 34 U/L Final     ALT   Date Value Ref Range Status   01/31/2025 14 <=55 U/L Final     Anion Gap   Date Value Ref Range Status   01/31/2025 16 7 - 16 mmol/L Final       Imaging:  No pertinent imaging    Independently reviewed    Assessment: Bipin Bassett 65 yo AAF here with:  History of colorectal cancer    Plan:  Keep appointment with Dr. Savage for follow-up colorectal cancer Oncology  Follow-up in 1 year or sooner    I spent a total of 30 minutes on the day of the  visit.This includes face to face time and non-face to face time preparing to see the patient (eg, review of tests), obtaining and/or reviewing separately obtained history, documenting clinical information in the electronic or other health record, independently interpreting results and communicating results to the patient/family/caregiver, or care coordinator.     PETRA Mckenzie  [facility]

## 2025-03-19 ENCOUNTER — HOSPITAL ENCOUNTER (OUTPATIENT)
Dept: RADIOLOGY | Facility: HOSPITAL | Age: 65
Discharge: HOME | End: 2025-03-19
Attending: INTERNAL MEDICINE
Payer: MEDICARE

## 2025-03-19 DIAGNOSIS — C20 RECTAL CANCER: ICD-10-CM

## 2025-03-19 PROCEDURE — 71250 CT THORAX DX C-: CPT | Mod: 26,,, | Performed by: RADIOLOGY

## 2025-03-19 PROCEDURE — 74176 CT ABD & PELVIS W/O CONTRAST: CPT | Mod: 26,,, | Performed by: RADIOLOGY

## 2025-03-19 PROCEDURE — 74176 CT ABD & PELVIS W/O CONTRAST: CPT | Mod: TC

## 2025-03-26 ENCOUNTER — HOSPITAL ENCOUNTER (OUTPATIENT)
Dept: RADIOLOGY | Facility: HOSPITAL | Age: 65
Discharge: HOME | End: 2025-03-26
Attending: INTERNAL MEDICINE
Payer: MEDICARE

## 2025-03-26 VITALS — WEIGHT: 85 LBS | BODY MASS INDEX: 14.16 KG/M2 | HEIGHT: 65 IN

## 2025-03-26 DIAGNOSIS — Z12.31 VISIT FOR SCREENING MAMMOGRAM: ICD-10-CM

## 2025-03-26 PROCEDURE — 77063 BREAST TOMOSYNTHESIS BI: CPT | Mod: TC

## 2025-03-26 PROCEDURE — 77067 SCR MAMMO BI INCL CAD: CPT | Mod: 26,,, | Performed by: RADIOLOGY

## 2025-03-26 PROCEDURE — 77063 BREAST TOMOSYNTHESIS BI: CPT | Mod: 26,,, | Performed by: RADIOLOGY

## 2025-07-06 ENCOUNTER — HOSPITAL ENCOUNTER (EMERGENCY)
Facility: HOSPITAL | Age: 65
Discharge: HOME | End: 2025-07-06
Attending: FAMILY MEDICINE
Payer: MEDICARE

## 2025-07-06 VITALS
BODY MASS INDEX: 14.58 KG/M2 | OXYGEN SATURATION: 100 % | WEIGHT: 87.5 LBS | SYSTOLIC BLOOD PRESSURE: 131 MMHG | TEMPERATURE: 98 F | RESPIRATION RATE: 18 BRPM | HEART RATE: 90 BPM | DIASTOLIC BLOOD PRESSURE: 68 MMHG | HEIGHT: 65 IN

## 2025-07-06 DIAGNOSIS — S61.213A LACERATION OF LEFT MIDDLE FINGER WITHOUT FOREIGN BODY WITHOUT DAMAGE TO NAIL, INITIAL ENCOUNTER: Primary | ICD-10-CM

## 2025-07-06 PROCEDURE — 12001 RPR S/N/AX/GEN/TRNK 2.5CM/<: CPT

## 2025-07-06 PROCEDURE — 99282 EMERGENCY DEPT VISIT SF MDM: CPT | Mod: 25

## 2025-07-06 NOTE — Clinical Note
"Bipin Kellertona Bassett was seen and treated in our emergency department on 7/6/2025.  She may return to work on 07/08/2025.       If you have any questions or concerns, please don't hesitate to call.      Peter Cantu, DO"

## 2025-07-07 NOTE — ED PROVIDER NOTES
Encounter Date: 7/6/2025    SCRIBE #1 NOTE: I, Erma Noonan, am scribing for, and in the presence of,  Peter Cantu DO. I have scribed the entire note.       History     Chief Complaint   Patient presents with    Laceration     Pt presents to ED complaining of laceration of L hand 3rd digit. States she injured the land when cutting her toenails. Bleeding controlled in triage.      This is a 65 y/o female,who presents to the ED for further evaluation. She states she clipping her toenails when she accidentally cut her left middle finger. There are no other complaints/pain in the ED at this time. She has a known hx of colorectal cancer. There is no surgical hx on file. She is a current every day smoker.     The history is provided by the patient. No  was used.     Review of patient's allergies indicates:   Allergen Reactions    Asa [aspirin] Anaphylaxis    Penicillins Anaphylaxis    Iodinated contrast media Hives    Isovue-m Itching     Past Medical History:   Diagnosis Date    Colorectal cancer 9/22/2023    Duodenitis 6/10/2023     Past Surgical History:   Procedure Laterality Date    DIGITAL RECTAL EXAMINATION UNDER ANESTHESIA N/A 8/30/2023    Procedure: EXAM UNDER ANESTHESIA, DIGITAL, RECTUM;  Surgeon: Joce العلي MD;  Location: [facility] OR;  Service: General;  Laterality: N/A;    PROCTOSCOPY N/A 8/30/2023    Procedure: PROCTOSCOPY;  Surgeon: Joce العلي MD;  Location: [facility] OR;  Service: General;  Laterality: N/A;    RESECTION, RECTUM, LOW ANTERIOR N/A 8/30/2023    Procedure: RESECTION, RECTUM, LOW ANTERIOR;  Surgeon: Joce العلي MD;  Location: [facility] OR;  Service: General;  Laterality: N/A;     Family History   Problem Relation Name Age of Onset    Cancer Mother      Cancer Father      Breast cancer Maternal Grandmother       Social History[1]  Review of Systems   Constitutional: Negative.  Negative for fever.   HENT: Negative.  Negative for sore throat.    Eyes:  Negative.    Respiratory: Negative.  Negative for shortness of breath.    Cardiovascular: Negative.  Negative for chest pain.   Gastrointestinal: Negative.  Negative for nausea.   Endocrine: Negative.    Genitourinary: Negative.  Negative for dysuria.   Musculoskeletal: Negative.  Negative for back pain.   Skin: Negative.  Negative for rash.        Laceration to the left middle finger.      Allergic/Immunologic: Negative.    Neurological: Negative.  Negative for weakness.   Hematological: Negative.  Does not bruise/bleed easily.   Psychiatric/Behavioral: Negative.     All other systems reviewed and are negative.      Physical Exam     Initial Vitals [07/06/25 2105]   BP Pulse Resp Temp SpO2   131/68 90 18 97.9 °F (36.6 °C) 100 %      MAP       --         Physical Exam    Nursing note and vitals reviewed.  Constitutional: She appears well-developed and well-nourished.   HENT:   Head: Normocephalic and atraumatic.   Right Ear: External ear normal.   Left Ear: External ear normal.   Nose: Nose normal. Mouth/Throat: Oropharynx is clear and moist.   Eyes: Conjunctivae and EOM are normal. Pupils are equal, round, and reactive to light.   Neck: Neck supple.   Normal range of motion.  Cardiovascular:  Normal rate, regular rhythm, normal heart sounds and intact distal pulses.           Pulmonary/Chest: Breath sounds normal.   Abdominal: Abdomen is soft. Bowel sounds are normal.   Genitourinary:    Vagina and uterus normal.     Musculoskeletal:         General: Normal range of motion.      Cervical back: Normal range of motion and neck supple.     Neurological: She is alert and oriented to person, place, and time. She has normal strength and normal reflexes.   Skin: Skin is warm and dry. Capillary refill takes less than 2 seconds.   There is a 1 CM laceration to the left middle finger.      Psychiatric: She has a normal mood and affect. Her behavior is normal. Judgment and thought content normal.         ED Course   Lac  Repair    Date/Time: 7/6/2025 9:29 PM    Performed by: Peter Cantu DO  Authorized by: Peter Cantu DO    Consent:     Consent obtained:  Verbal    Consent given by:  Patient    Risks, benefits, and alternatives were discussed: yes      Risks discussed:  Infection, pain and poor cosmetic result    Alternatives discussed:  No treatment  Universal protocol:     Patient identity confirmed:  Verbally with patient and arm band  Anesthesia:     Anesthesia method:  None  Laceration details:     Location:  Finger    Finger location:  L long finger    Length (cm):  1  Pre-procedure details:     Preparation:  Patient was prepped and draped in usual sterile fashion  Exploration:     Limited defect created (wound extended): no      Wound exploration: wound explored through full range of motion and entire depth of wound visualized      Wound extent: no foreign bodies/material noted, no muscle damage noted, no nerve damage noted and no tendon damage noted      Contaminated: no    Treatment:     Area cleansed with:  Povidone-iodine and saline    Amount of cleaning:  Standard    Irrigation solution:  Sterile saline    Irrigation method:  Syringe    Visualized foreign bodies/material removed: no      Debridement:  None    Undermining:  None    Scar revision: no    Skin repair:     Repair method:  Tissue adhesive  Approximation:     Approximation:  Close  Repair type:     Repair type:  Simple  Post-procedure details:     Procedure completion:  Tolerated well, no immediate complications    Labs Reviewed - No data to display       Imaging Results    None          Medications - No data to display  Medical Decision Making  This is a 63 y/o female,who presents to the ED for further evaluation. She states she clipping her toenails when she accidentally cut her left middle finger. There are no other complaints/pain in the ED at this time. She has a known hx of colorectal cancer. There is no surgical hx on file. She is a  current every day smoker.     Amount and/or Complexity of Data Reviewed  Independent Historian:      Details: We spoke with the pt.                Attending Attestation:           Physician Attestation for Scribe:  Physician Attestation Statement for Scribe #1: I, Peter Cantu DO, reviewed documentation, as scribed by Erma Noonan in my presence, and it is both accurate and complete.                        Medical Decision Making:   Initial Assessment:   This is a 63 y/o female,who presents to the ED for further evaluation. She states she clipping her toenails when she accidentally cut her left middle finger. There are no other complaints/pain in the ED at this time. She has a known hx of colorectal cancer. There is no surgical hx on file. She is a current every day smoker.     The history is provided by the patient. No  was used.     Differential Diagnosis:   Laceration to finger  ED Management:  Procedure note this was repaired with 4-0 Ethilon 4. Simple sutures no code no problem.  Anesthesia lidocaine flank             Clinical Impression:  Final diagnoses:  [S61.213A] Laceration of left middle finger without foreign body without damage to nail, initial encounter (Primary)          ED Disposition Condition    Discharge Stable          ED Prescriptions    None       Follow-up Information    None                [1]   Social History  Tobacco Use    Smoking status: Every Day     Current packs/day: 0.50     Average packs/day: 1 pack/day for 52.0 years (51.6 ttl pk-yrs)     Types: Cigarettes     Start date: 7/31/1973    Smokeless tobacco: Never   Substance Use Topics    Alcohol use: Yes     Alcohol/week: 2.0 standard drinks of alcohol     Types: 2 Cans of beer per week     Comment: daily    Drug use: Never        Peter Cantu DO  07/16/25 1940

## (undated) DEVICE — STAPLER INTERNL CONTOR CV BLU

## (undated) DEVICE — SPONGE LAP XRAY STERILE 18X18

## (undated) DEVICE — CATH URETH FOLEY 2W 20FR 30ML

## (undated) DEVICE — COUNTER NDL FOAM MAGNET 40/70

## (undated) DEVICE — Device

## (undated) DEVICE — PENCIL ELECTROSURG HOLST W/BLD

## (undated) DEVICE — GOWN POLY REINF BRTH SLV XL

## (undated) DEVICE — SYR 50ML CATH TIP

## (undated) DEVICE — GLOVE 6.5 PROTEXIS PI BLUE

## (undated) DEVICE — ELECTRODE BLADE INSULATED 1 IN

## (undated) DEVICE — GLOVE PROTEXIS PI SYN SURG 7

## (undated) DEVICE — GLOVE PROTEXIS PI SYN SURG 8.0

## (undated) DEVICE — GOWN NONREINF SET-IN SLV 2XL

## (undated) DEVICE — GLOVE PROTEXIS PI SYN SURG 6.5

## (undated) DEVICE — DRESSING PICO 7 TWO 10X20CM

## (undated) DEVICE — COVER PROXIMA MAYO STAND

## (undated) DEVICE — GLOVE PROTEXIS PI SYN SURG 7.5

## (undated) DEVICE — BULB BLADDER ASSEMBLY STRL

## (undated) DEVICE — STAPLER SKIN WIDE

## (undated) DEVICE — GLOVE PROTEXIS PI SYN SURG 6.0

## (undated) DEVICE — SPONGE COTTON TRAY 4X4IN

## (undated) DEVICE — SUT PROLENE BLU 2-0 SH 48IN

## (undated) DEVICE — GLOVE 8.5 PROTEXIS PI BLUE

## (undated) DEVICE — LEGGING CLEAR POLY 2/PACK

## (undated) DEVICE — DRAPE THREE-QTR REINF 53X77IN

## (undated) DEVICE — SUT SILK 2.0 BLK 18

## (undated) DEVICE — SYR IRRIGATION BULB STER 60ML

## (undated) DEVICE — GOWN ASTOUND AAMI LVL3 BLUE LG

## (undated) DEVICE — SUT SILK 3-0 SH DETACH 30IN

## (undated) DEVICE — SUT 1 48IN PDS II VIO MONO

## (undated) DEVICE — SEALER LIGASURE IMPACT 18CM

## (undated) DEVICE — TAPE DRAPE STRIP CLSR 4.5X24IN

## (undated) DEVICE — TRAY SKIN SCRUB WET PREMIUM

## (undated) DEVICE — BLADE ELECTRO EDGE INSULATED